# Patient Record
Sex: MALE | Race: BLACK OR AFRICAN AMERICAN | Employment: FULL TIME | URBAN - METROPOLITAN AREA
[De-identification: names, ages, dates, MRNs, and addresses within clinical notes are randomized per-mention and may not be internally consistent; named-entity substitution may affect disease eponyms.]

---

## 2019-06-05 ENCOUNTER — OFFICE VISIT (OUTPATIENT)
Dept: INTERNAL MEDICINE CLINIC | Age: 29
End: 2019-06-05
Payer: MEDICARE

## 2019-06-05 VITALS
DIASTOLIC BLOOD PRESSURE: 74 MMHG | HEART RATE: 59 BPM | OXYGEN SATURATION: 96 % | BODY MASS INDEX: 34.44 KG/M2 | SYSTOLIC BLOOD PRESSURE: 122 MMHG | WEIGHT: 246 LBS | HEIGHT: 71 IN

## 2019-06-05 DIAGNOSIS — F33.42 RECURRENT MAJOR DEPRESSIVE DISORDER, IN FULL REMISSION (HCC): ICD-10-CM

## 2019-06-05 DIAGNOSIS — E55.9 VITAMIN D DEFICIENCY: ICD-10-CM

## 2019-06-05 DIAGNOSIS — E66.09 CLASS 1 OBESITY DUE TO EXCESS CALORIES WITHOUT SERIOUS COMORBIDITY WITH BODY MASS INDEX (BMI) OF 34.0 TO 34.9 IN ADULT: ICD-10-CM

## 2019-06-05 DIAGNOSIS — F90.0 ATTENTION DEFICIT HYPERACTIVITY DISORDER (ADHD), PREDOMINANTLY INATTENTIVE TYPE: ICD-10-CM

## 2019-06-05 DIAGNOSIS — Z76.89 ENCOUNTER TO ESTABLISH CARE: Primary | ICD-10-CM

## 2019-06-05 PROBLEM — E66.811 CLASS 1 OBESITY DUE TO EXCESS CALORIES WITHOUT SERIOUS COMORBIDITY WITH BODY MASS INDEX (BMI) OF 34.0 TO 34.9 IN ADULT: Status: ACTIVE | Noted: 2019-06-05

## 2019-06-05 PROCEDURE — 1036F TOBACCO NON-USER: CPT | Performed by: PHYSICIAN ASSISTANT

## 2019-06-05 PROCEDURE — 99204 OFFICE O/P NEW MOD 45 MIN: CPT | Performed by: PHYSICIAN ASSISTANT

## 2019-06-05 PROCEDURE — G8417 CALC BMI ABV UP PARAM F/U: HCPCS | Performed by: PHYSICIAN ASSISTANT

## 2019-06-05 PROCEDURE — G8427 DOCREV CUR MEDS BY ELIG CLIN: HCPCS | Performed by: PHYSICIAN ASSISTANT

## 2019-06-05 RX ORDER — DEXTROAMPHETAMINE SACCHARATE, AMPHETAMINE ASPARTATE, DEXTROAMPHETAMINE SULFATE AND AMPHETAMINE SULFATE 2.5; 2.5; 2.5; 2.5 MG/1; MG/1; MG/1; MG/1
10 TABLET ORAL DAILY
Qty: 30 TABLET | Refills: 0 | Status: SHIPPED | OUTPATIENT
Start: 2019-06-05 | End: 2019-07-04 | Stop reason: SDUPTHER

## 2019-06-05 ASSESSMENT — ENCOUNTER SYMPTOMS
COLOR CHANGE: 0
COUGH: 0
BACK PAIN: 0
SHORTNESS OF BREATH: 0
VOICE CHANGE: 0
VOMITING: 0
SINUS PAIN: 0
EYE REDNESS: 0
EYE DISCHARGE: 0
DIARRHEA: 0
CONSTIPATION: 0
WHEEZING: 0
RHINORRHEA: 0
CHOKING: 0
ABDOMINAL PAIN: 0
BLOOD IN STOOL: 0
PHOTOPHOBIA: 0
SORE THROAT: 0
NAUSEA: 0
EYE ITCHING: 0
EYE PAIN: 0
TROUBLE SWALLOWING: 0
CHEST TIGHTNESS: 0

## 2019-06-05 ASSESSMENT — PATIENT HEALTH QUESTIONNAIRE - PHQ9
1. LITTLE INTEREST OR PLEASURE IN DOING THINGS: 0
SUM OF ALL RESPONSES TO PHQ QUESTIONS 1-9: 0
SUM OF ALL RESPONSES TO PHQ9 QUESTIONS 1 & 2: 0
2. FEELING DOWN, DEPRESSED OR HOPELESS: 0
SUM OF ALL RESPONSES TO PHQ QUESTIONS 1-9: 0

## 2019-06-05 NOTE — PROGRESS NOTES
[facility]    New Patient Note/History and Physical    Date of patient's visit: 6/5/2019    Name:  Fely Colin      YOB: 1990    Patient Care Team:  Lisandra Swan PA-C as PCP - General (Physician Assistant)    REASON FOR VISIT:   Establish care    HISTORY OF PRESENTING ILLNESS:    History was obtained from the patient. Fely Colin is a 33 y.o. male here to establish care. Patient previously seeing Dr. Oleksandr Calderon, [facility] greater than 3 years ago. Patient reports past medical history as below. Attention deficit and hyperactivity disorder. Patient has medical records, documented history of symptoms as child. Previously on Adderall with improved control of symptoms. Patient denies history of tobacco use. Social etoh use, no drug use. Patient is single, no children. Student in college, senior studying . PAST MEDICAL HISTORY:    History reviewed. No pertinent past medical history. PAST SURGICAL HISTORY:          Procedure Laterality Date    WRIST FRACTURE SURGERY       ALLERGIES:    No Known Allergies      MEDICATION:      No current outpatient medications on file prior to visit. No current facility-administered medications on file prior to visit. FAMILY HISTORY:    No family history on file. SOCIAL HISTORY:      Social History     Socioeconomic History    Marital status: Single     Spouse name: None    Number of children: None    Years of education: None    Highest education level: None   Occupational History    None   Social Needs    Financial resource strain: None    Food insecurity:     Worry: None     Inability: None    Transportation needs:     Medical: None     Non-medical: None   Tobacco Use    Smoking status: Never Smoker    Smokeless tobacco: Never Used   Substance and Sexual Activity    Alcohol use:  Yes     Alcohol/week: 0.0 oz     Comment: socially    Drug use: None    Sexual activity: None Negative for adenopathy. Does not bruise/bleed easily. Psychiatric/Behavioral: Positive for decreased concentration. Negative for agitation, behavioral problems, confusion, dysphoric mood and sleep disturbance. The patient is not nervous/anxious.       PHYSICAL EXAM:      Vitals:    06/05/19 1119   BP: 122/74   Pulse: 59   SpO2: 96%   Weight: 246 lb (111.6 kg)   Height: 5' 10.98\" (1.803 m)     General - alert, well appearing, and in no distress  Skin - normal coloration and turgor, no rashes, no suspicious skin lesions noted  Eyes - pupils equal and reactive, extraocular eye movements intact  Ears - bilateral TM's and external ear canals normal  Nose - normal and patent, no erythema, discharge or polyps  Mouth - mucous membranes moist, pharynx normal without lesions  Neck - supple, nosignificant adenopathy, no palpable masses, no carotid bruit bilaterally   Lymphatics - no palpable lymphadenopathy, no hepatosplenomegaly  Chest - clear to auscultation, no wheezes, rales or rhonchi, symmetric air entry  Heart - normal rate, regular rhythm, no murmurs, rubs, clicks or gallops  Abdomen - soft, nontender, nondistended, no masses or organomegaly  Back - full range of motion, no tenderness, palpable spasm or pain on motion  Neurological -alert, oriented, normal speech, no focal sensory or motor deficit, cranial nerve exam without deficit  Musculoskeletal - no joint tenderness, deformity or swelling, strength 5/5 in upper and lower extremities   Extremities - peripheral pulses normal, no pedal edema    LABORATORY FINDINGS:    CBC:   Lab Results   Component Value Date    WBC 9.5 02/27/2016    HGB 13.6 02/27/2016     02/27/2016     BMP:    Lab Results   Component Value Date     02/27/2016    K 4.0 02/27/2016     02/27/2016    CO2 25 02/27/2016    BUN 11 02/27/2016    CREATININE 0.74 02/27/2016    GLUCOSE 93 02/27/2016     Hemoglobin A1C: No results found for: LABA1C  Lipid profile:   Lab Results Component Value Date    CHOL 163 02/27/2016    TRIG 39 02/27/2016    HDL 56 02/27/2016     Thyroid functions:   Lab Results   Component Value Date    TSH 0.85 02/27/2016      Hepatic functions:   Lab Results   Component Value Date    ALT 22 02/27/2016    AST 43 02/27/2016    PROT 8.0 02/27/2016    BILITOT 0.61 02/27/2016    LABALBU 4.5 02/27/2016     ASSESSMENT AND PLAN:     1. Encounter to establish care  - CBC Auto Differential; Future  - Comprehensive Metabolic Panel; Future  - TSH without Reflex; Future  - Urinalysis; Future  - Vitamin D 25 Hydroxy; Future  - Lipid Panel; Future    2. Attention deficit hyperactivity disorder (ADHD), predominantly inattentive type  - discussed controlled substance monitoring, will resume medication  - amphetamine-dextroamphetamine (ADDERALL, 10MG,) 10 MG tablet; Take 1 tablet by mouth daily for 30 days. Dispense: 30 tablet; Refill: 0    3. Recurrent major depressive disorder, in full remission ([facility] [address]  - well controlled, no medications currently  - continue to monitor     4. Class 1 obesity due to excess calories without serious comorbidity with body mass index (BMI) of 34.0 to 34.9 in adult  - education materials provided  - dietary modifications, increased physical activity    5. Vitamin D deficiency  - Vitamin D 25 Hydroxy; Future    INSTRUCTIONS:   Return in about 3 months (around 9/5/2019). Taurus received counseling onthe following healthy behaviors: nutrition, exercise and medication adherence    Reviewed prior labs and health maintenance. Discussed use, benefit, and side effects of prescribed medications. Barriers tomedication compliance addressed. All patient questions answered. Patient voiced understanding. Patient given educational materials - see patient instructions    EDWIN Haynes [facility]  6/5/2019, 11:53 AM    Please note that this chart was generated using voice recognition Dragon dictation software.   Although every effort was made to ensure the accuracy of this automatedtranscription, some errors in transcription may have occurred.

## 2019-06-05 NOTE — PATIENT INSTRUCTIONS
Patient Education        Starting a Weight Loss Plan: Care Instructions  Your Care Instructions    If you are thinking about losing weight, it can be hard to know where to start. Your doctor can help you set up a weight loss plan that best meets your needs. You may want to take a class on nutrition or exercise, or join a weight loss support group. If you have questions about how to make changes to your eating or exercise habits, ask your doctor about seeing a registered dietitian or an exercise specialist.  It can be a big challenge to lose weight. But you do not have to make huge changes at once. Make small changes, and stick with them. When those changes become habit, add a few more changes. If you do not think you are ready to make changes right now, try to pick a date in the future. Make an appointment to see your doctor to discuss whether the time is right for you to start a plan. Follow-up care is a key part of your treatment and safety. Be sure to make and go to all appointments, and call your doctor if you are having problems. It's also a good idea to know your test results and keep a list of the medicines you take. How can you care for yourself at home? · Set realistic goals. Many people expect to lose much more weight than is likely. A weight loss of 5% to 10% of your body weight may be enough to improve your health. · Get family and friends involved to provide support. Talk to them about why you are trying to lose weight, and ask them to help. They can help by participating in exercise and having meals with you, even if they may be eating something different. · Find what works best for you. If you do not have time or do not like to cook, a program that offers meal replacement bars or shakes may be better for you.  Or if you like to prepare meals, finding a plan that includes daily menus and recipes may be best.  · Ask your doctor about other health professionals who can help you achieve your weight at increased risk for health problems such as fatigue, lower protection (immunity) against illness, muscle loss, bone loss, hair loss, and hormone problems. BMI is just one measure of your risk for weight-related health problems. You may be at higher risk for health problems if you are not active, you eat an unhealthy diet, or you drink too much alcohol or use tobacco products. Follow-up care is a key part of your treatment and safety. Be sure to make and go to all appointments, and call your doctor if you are having problems. It's also a good idea to know your test results and keep a list of the medicines you take. How can you care for yourself at home? · Practice healthy eating habits. This includes eating plenty of fruits, vegetables, whole grains, lean protein, and low-fat dairy. · If your doctor recommends it, get more exercise. Walking is a good choice. Bit by bit, increase the amount you walk every day. Try for at least 30 minutes on most days of the week. · Do not smoke. Smoking can increase your risk for health problems. If you need help quitting, talk to your doctor about stop-smoking programs and medicines. These can increase your chances of quitting for good. · Limit alcohol to 2 drinks a day for men and 1 drink a day for women. Too much alcohol can cause health problems. If you have a BMI higher than 25  · Your doctor may do other tests to check your risk for weight-related health problems. This may include measuring the distance around your waist. A waist measurement of more than 40 inches in men or 35 inches in women can increase the risk of weight-related health problems. · Talk with your doctor about steps you can take to stay healthy or improve your health. You may need to make lifestyle changes to lose weight and stay healthy, such as changing your diet and getting regular exercise.   If you have a BMI lower than 18.5  · Your doctor may do other tests to check your risk for health

## 2019-07-03 DIAGNOSIS — F90.0 ATTENTION DEFICIT HYPERACTIVITY DISORDER (ADHD), PREDOMINANTLY INATTENTIVE TYPE: ICD-10-CM

## 2019-07-03 DIAGNOSIS — Z51.81 ENCOUNTER FOR MEDICATION MONITORING: Primary | ICD-10-CM

## 2019-07-03 NOTE — TELEPHONE ENCOUNTER
Medication Requested:  Adderall    Last visit: 6/5/2019  Next visit: 9/6/2019  Last refill: 6/5/2019    Med contract on file:  [x] yes   [] no    Last urine drug screen:  Needs UDS ordered  Consistent with medication(s):    [] yes   [] no    Last OARRS ran:     Quantity of medication remaining: 3  Who will be picking rx up: self    Pharmacy if escribed: [facility] / [place]

## 2019-07-04 RX ORDER — DEXTROAMPHETAMINE SACCHARATE, AMPHETAMINE ASPARTATE, DEXTROAMPHETAMINE SULFATE AND AMPHETAMINE SULFATE 2.5; 2.5; 2.5; 2.5 MG/1; MG/1; MG/1; MG/1
10 TABLET ORAL DAILY
Qty: 30 TABLET | Refills: 0 | Status: SHIPPED | OUTPATIENT
Start: 2019-07-04 | End: 2019-08-13 | Stop reason: SDUPTHER

## 2019-08-06 DIAGNOSIS — F90.0 ATTENTION DEFICIT HYPERACTIVITY DISORDER (ADHD), PREDOMINANTLY INATTENTIVE TYPE: ICD-10-CM

## 2019-08-06 RX ORDER — DEXTROAMPHETAMINE SACCHARATE, AMPHETAMINE ASPARTATE, DEXTROAMPHETAMINE SULFATE AND AMPHETAMINE SULFATE 2.5; 2.5; 2.5; 2.5 MG/1; MG/1; MG/1; MG/1
10 TABLET ORAL DAILY
Qty: 30 TABLET | Refills: 0 | Status: CANCELLED | OUTPATIENT
Start: 2019-08-06 | End: 2019-09-05

## 2019-08-12 DIAGNOSIS — F90.0 ATTENTION DEFICIT HYPERACTIVITY DISORDER (ADHD), PREDOMINANTLY INATTENTIVE TYPE: ICD-10-CM

## 2019-08-13 RX ORDER — DEXTROAMPHETAMINE SACCHARATE, AMPHETAMINE ASPARTATE, DEXTROAMPHETAMINE SULFATE AND AMPHETAMINE SULFATE 2.5; 2.5; 2.5; 2.5 MG/1; MG/1; MG/1; MG/1
10 TABLET ORAL DAILY
Qty: 30 TABLET | Refills: 0 | Status: SHIPPED | OUTPATIENT
Start: 2019-08-13 | End: 2019-09-06 | Stop reason: SDUPTHER

## 2019-09-04 DIAGNOSIS — F90.0 ATTENTION DEFICIT HYPERACTIVITY DISORDER (ADHD), PREDOMINANTLY INATTENTIVE TYPE: ICD-10-CM

## 2019-09-06 ENCOUNTER — OFFICE VISIT (OUTPATIENT)
Dept: INTERNAL MEDICINE CLINIC | Age: 29
End: 2019-09-06
Payer: MEDICARE

## 2019-09-06 VITALS
OXYGEN SATURATION: 97 % | SYSTOLIC BLOOD PRESSURE: 138 MMHG | DIASTOLIC BLOOD PRESSURE: 62 MMHG | WEIGHT: 222 LBS | BODY MASS INDEX: 31.08 KG/M2 | HEIGHT: 71 IN | HEART RATE: 77 BPM

## 2019-09-06 DIAGNOSIS — E66.09 CLASS 1 OBESITY DUE TO EXCESS CALORIES WITHOUT SERIOUS COMORBIDITY WITH BODY MASS INDEX (BMI) OF 30.0 TO 30.9 IN ADULT: ICD-10-CM

## 2019-09-06 DIAGNOSIS — F90.0 ATTENTION DEFICIT HYPERACTIVITY DISORDER (ADHD), PREDOMINANTLY INATTENTIVE TYPE: Primary | ICD-10-CM

## 2019-09-06 DIAGNOSIS — F33.42 RECURRENT MAJOR DEPRESSIVE DISORDER, IN FULL REMISSION (HCC): ICD-10-CM

## 2019-09-06 DIAGNOSIS — E55.9 VITAMIN D DEFICIENCY: ICD-10-CM

## 2019-09-06 PROCEDURE — 1036F TOBACCO NON-USER: CPT | Performed by: PHYSICIAN ASSISTANT

## 2019-09-06 PROCEDURE — 99214 OFFICE O/P EST MOD 30 MIN: CPT | Performed by: PHYSICIAN ASSISTANT

## 2019-09-06 PROCEDURE — G8427 DOCREV CUR MEDS BY ELIG CLIN: HCPCS | Performed by: PHYSICIAN ASSISTANT

## 2019-09-06 PROCEDURE — G8417 CALC BMI ABV UP PARAM F/U: HCPCS | Performed by: PHYSICIAN ASSISTANT

## 2019-09-06 RX ORDER — DEXTROAMPHETAMINE SACCHARATE, AMPHETAMINE ASPARTATE MONOHYDRATE, DEXTROAMPHETAMINE SULFATE AND AMPHETAMINE SULFATE 2.5; 2.5; 2.5; 2.5 MG/1; MG/1; MG/1; MG/1
10 CAPSULE, EXTENDED RELEASE ORAL DAILY
Qty: 30 CAPSULE | Refills: 0 | Status: SHIPPED | OUTPATIENT
Start: 2019-09-06 | End: 2019-10-03 | Stop reason: SDUPTHER

## 2019-09-06 RX ORDER — OXYCODONE HYDROCHLORIDE AND ACETAMINOPHEN 5; 325 MG/1; MG/1
TABLET ORAL
COMMUNITY
End: 2019-09-06 | Stop reason: ALTCHOICE

## 2019-09-06 RX ORDER — DEXTROAMPHETAMINE SACCHARATE, AMPHETAMINE ASPARTATE, DEXTROAMPHETAMINE SULFATE AND AMPHETAMINE SULFATE 2.5; 2.5; 2.5; 2.5 MG/1; MG/1; MG/1; MG/1
10 TABLET ORAL DAILY
Qty: 30 TABLET | Refills: 0 | OUTPATIENT
Start: 2019-09-06 | End: 2019-10-06

## 2019-09-06 ASSESSMENT — ENCOUNTER SYMPTOMS
EYE ITCHING: 0
EYE DISCHARGE: 0
DIARRHEA: 0
ABDOMINAL PAIN: 0
BACK PAIN: 0
NAUSEA: 0
COLOR CHANGE: 0
VOMITING: 0
CHEST TIGHTNESS: 0
CONSTIPATION: 0
SINUS PAIN: 0
RHINORRHEA: 0
COUGH: 0
EYE PAIN: 0
SORE THROAT: 0
SHORTNESS OF BREATH: 0
BLOOD IN STOOL: 0

## 2019-09-06 NOTE — PROGRESS NOTES
medications for this visit. ALLERGIES:    No Known Allergies      SOCIAL HISTORY    Reviewed and no change from previous record. Taurus  reports that he has never smoked. He has never used smokeless tobacco.    FAMILY HISTORY:    Reviewed and No change from previous visit    REVIEW OF SYSTEMS:    Review of Systems   Constitutional: Negative for chills, fatigue and fever. HENT: Negative for congestion, ear discharge, ear pain, hearing loss, rhinorrhea, sinus pain and sore throat. Eyes: Negative for pain, discharge, itching and visual disturbance. Respiratory: Negative for cough, chest tightness and shortness of breath. Cardiovascular: Negative for chest pain, palpitations and leg swelling. Gastrointestinal: Negative for abdominal pain, blood in stool, constipation, diarrhea, nausea and vomiting. Genitourinary: Negative for difficulty urinating, dysuria, flank pain, frequency, hematuria and urgency. Musculoskeletal: Negative for arthralgias, back pain, neck pain and neck stiffness. Skin: Negative for color change, pallor and rash. Neurological: Negative for dizziness, weakness, light-headedness, numbness and headaches. Hematological: Negative for adenopathy. Does not bruise/bleed easily. Psychiatric/Behavioral: Positive for decreased concentration. Negative for dysphoric mood and sleep disturbance. The patient is not nervous/anxious.       PHYSICAL EXAM:      Vitals:    09/06/19 1025   BP: 138/62   Pulse: 77   SpO2: 97%   Weight: 222 lb (100.7 kg)   Height: 5' 10.98\" (1.803 m)     BP Readings from Last 3 Encounters:   09/06/19 138/62   06/05/19 122/74   05/26/16 122/70      Wt Readings from Last 3 Encounters:   09/06/19 222 lb (100.7 kg)   06/05/19 246 lb (111.6 kg)   05/26/16 174 lb 6 oz (79.1 kg)     General - alert, well appearing, and in no distress  Skin - normal coloration and turgor, no rashes  Eyes - pupils equal and reactive, extraocular eye movements intact  Mouth - mucous prescribed medications. Barriers to medication compliance addressed. All patient questions answered. Patient voiced understanding. Patient given educational materials - see patient instructions    EDWIN Reina [facility]  9/6/2019, 10:56 AM    Please note that this chart was generated using voice recognition Dragon dictation software. Although everyeffort was made to ensure the accuracy of this automated transcription, some errors in transcription may have occurred.

## 2019-09-06 NOTE — PROGRESS NOTES
Visit Information    Have you changed or started any medications since your last visit including any over-the-counter medicines, vitamins, or herbal medicines? no   Are you having any side effects from any of your medications? -  no  Have you stopped taking any of your medications? Is so, why? -  no    Have you seen any other physician or provider since your last visit? No  Have you had any other diagnostic tests since your last visit? No  Have you been seen in the emergency room and/or had an admission to a hospital since we last saw you? No  Have you had your routine dental cleaning in the past 6 months? no    Have you activated your [employer] account? If not, what are your barriers?  No     Patient Care Team:  Tg Ozuna PA-C as PCP - General (Physician Assistant)  Tg Ozuna PA-C as PCP - [facility] Provider    Medical History Review  Past Medical, Family, and Social History reviewed and does not contribute to the patient presenting condition    Health Maintenance   Topic Date Due    Varicella Vaccine (1 of 2 - 13+ 2-dose series) 11/26/2004    HIV screen  11/26/2006    DTaP/Tdap/Td vaccine (1 - Tdap) 11/26/2010    Flu vaccine (1) 09/01/2019    HPV vaccine  Aged Out    Pneumococcal 0-64 years Vaccine  Aged Out

## 2019-10-03 DIAGNOSIS — F90.0 ATTENTION DEFICIT HYPERACTIVITY DISORDER (ADHD), PREDOMINANTLY INATTENTIVE TYPE: ICD-10-CM

## 2019-10-07 RX ORDER — DEXTROAMPHETAMINE SACCHARATE, AMPHETAMINE ASPARTATE MONOHYDRATE, DEXTROAMPHETAMINE SULFATE AND AMPHETAMINE SULFATE 2.5; 2.5; 2.5; 2.5 MG/1; MG/1; MG/1; MG/1
10 CAPSULE, EXTENDED RELEASE ORAL DAILY
Qty: 30 CAPSULE | Refills: 0 | Status: SHIPPED | OUTPATIENT
Start: 2019-10-07 | End: 2019-11-12 | Stop reason: SDUPTHER

## 2019-10-15 ENCOUNTER — HOSPITAL ENCOUNTER (OUTPATIENT)
Age: 29
Setting detail: SPECIMEN
Discharge: HOME | End: 2019-10-15
Payer: MEDICARE

## 2019-10-16 DIAGNOSIS — Z51.81 ENCOUNTER FOR MEDICATION MONITORING: ICD-10-CM

## 2019-10-20 LAB
6-ACETYLMORPHINE, UR: NOT DETECTED
7-AMINOCLONAZEPAM, URINE: NOT DETECTED
ALPHA-OH-ALPRAZ, URINE: NOT DETECTED
ALPRAZOLAM, URINE: NOT DETECTED
AMPHETAMINES, URINE: NOT DETECTED
BARBITURATES, URINE: NOT DETECTED
BENZOYLECGONINE, UR: NOT DETECTED
BUPRENORPHINE URINE: NOT DETECTED
CARISOPRODOL, UR: NOT DETECTED
CLONAZEPAM, URINE: NOT DETECTED
CODEINE, URINE: NOT DETECTED
CREATININE URINE: 155.3 MG/DL (ref 20–400)
DIAZEPAM, URINE: NOT DETECTED
DRUGS EXPECTED, UR: NORMAL
EER HI RES INTERP UR: NORMAL
ETHYL GLUCURONIDE UR: NOT DETECTED
FENTANYL URINE: NOT DETECTED
HYDROCODONE, URINE: NOT DETECTED
HYDROMORPHONE, URINE: NOT DETECTED
LORAZEPAM, URINE: NOT DETECTED
MARIJUANA METAB, UR: NOT DETECTED
MDA, UR: NOT DETECTED
MDEA, EVE, UR: NOT DETECTED
MDMA URINE: NOT DETECTED
MEPERIDINE METAB, UR: NOT DETECTED
METHADONE, URINE: NOT DETECTED
METHAMPHETAMINE, URINE: NOT DETECTED
METHYLPHENIDATE: NOT DETECTED
MIDAZOLAM, URINE: NOT DETECTED
MORPHINE URINE: NOT DETECTED
NORBUPRENORPHINE, URINE: NOT DETECTED
NORDIAZEPAM, URINE: NOT DETECTED
NORFENTANYL, URINE: NOT DETECTED
NORHYDROCODONE, URINE: NOT DETECTED
NOROXYCODONE, URINE: NOT DETECTED
NOROXYMORPHONE, URINE: NOT DETECTED
OXAZEPAM, URINE: NOT DETECTED
OXYCODONE URINE: NOT DETECTED
OXYMORPHONE, URINE: NOT DETECTED
PAIN MANAGEMENT DRUG PANEL INTERP, URINE: NORMAL
PAIN MGT DRUG PANEL, HI RES, UR: NORMAL
PCP,URINE: NOT DETECTED
PHENTERMINE, UR: NOT DETECTED
PROPOXYPHENE, URINE: NOT DETECTED
TAPENTADOL, URINE: NOT DETECTED
TAPENTADOL-O-SULFATE, URINE: NOT DETECTED
TEMAZEPAM, URINE: NOT DETECTED
TRAMADOL, URINE: NOT DETECTED
ZOLPIDEM, URINE: NOT DETECTED

## 2019-11-12 ENCOUNTER — TELEPHONE (OUTPATIENT)
Dept: INTERNAL MEDICINE CLINIC | Age: 29
End: 2019-11-12

## 2019-11-12 DIAGNOSIS — F90.0 ATTENTION DEFICIT HYPERACTIVITY DISORDER (ADHD), PREDOMINANTLY INATTENTIVE TYPE: ICD-10-CM

## 2019-11-12 RX ORDER — DEXTROAMPHETAMINE SACCHARATE, AMPHETAMINE ASPARTATE MONOHYDRATE, DEXTROAMPHETAMINE SULFATE AND AMPHETAMINE SULFATE 2.5; 2.5; 2.5; 2.5 MG/1; MG/1; MG/1; MG/1
10 CAPSULE, EXTENDED RELEASE ORAL DAILY
Qty: 30 CAPSULE | Refills: 0 | Status: SHIPPED | OUTPATIENT
Start: 2019-11-12 | End: 2019-12-06 | Stop reason: SDUPTHER

## 2019-12-05 LAB
ABSOLUTE BASO #: 0 X10E9/L (ref 0–0.9)
ABSOLUTE EOS #: 0 X10E9/L (ref 0–0.4)
ABSOLUTE LYMPH #: 1.5 X10E9/L (ref 1–3.5)
ABSOLUTE MONO #: 0.8 X10E9/L (ref 0–0.9)
ABSOLUTE NEUT #: 7.7 X10E9/L (ref 1.5–6.6)
ALBUMIN SERPL-MCNC: 4.6 G/DL (ref 3.2–5.3)
ALK PHOSPHATASE: 45 U/L (ref 39–130)
ALT SERPL-CCNC: 31 U/L (ref 0–40)
ANION GAP SERPL CALCULATED.3IONS-SCNC: 10 MMOL/L (ref 4–12)
APPEARANCE: CLEAR
AST SERPL-CCNC: 26 U/L (ref 0–41)
BASOPHILS RELATIVE PERCENT: 0.2 %
BILIRUB SERPL-MCNC: 0.8 MG/DL (ref 0.3–1.2)
BILIRUBIN: NEGATIVE
BUN BLDV-MCNC: 12 MG/DL (ref 5–23)
CALCIUM SERPL-MCNC: 9.7 MG/DL (ref 8.5–10.5)
CHLORIDE BLD-SCNC: 103 MMOL/L (ref 98–109)
CHOLESTEROL/HDL RATIO: 3.8 (ref 1–5)
CHOLESTEROL: 161 MG/DL (ref 150–200)
CO2: 29 MMOL/L (ref 22–32)
COLOR: YELLOW
CREAT SERPL-MCNC: 1.02 MG/DL (ref 0.6–1.3)
EGFR AFRICAN AMERICAN: >60 ML/MIN/1.73SQ.M
EGFR IF NONAFRICAN AMERICAN: >60 ML/MIN/1.73SQ.M
EOSINOPHILS RELATIVE PERCENT: 0.5 %
GLUCOSE BLD-MCNC: NEGATIVE MG/DL
GLUCOSE: 90 MG/DL (ref 65–99)
HCT VFR BLD CALC: 43.9 % (ref 39–49)
HDLC SERPL-MCNC: 42 MG/DL
HEMOGLOBIN: 14 G/DL (ref 13.1–17.3)
KETONES, URINE: NEGATIVE MG/DL
LDL CHOLESTEROL CALCULATED: 104 MG/DL
LDL/HDL RATIO: 2.5
LEUKOCYTE ESTERASE, URINE: NEGATIVE
LYMPHOCYTE %: 14.6 %
MCH RBC QN AUTO: 23.3 PG (ref 27–34)
MCHC RBC AUTO-ENTMCNC: 31.9 G/DL (ref 32–37)
MCV RBC AUTO: 73 FL (ref 80–99)
MONOCYTES # BLD: 7.8 %
NEUTROPHILS RELATIVE PERCENT: 76.9 %
NITRITE, URINE: NEGATIVE
OCCULT BLOOD,URINE: NEGATIVE
OTHER MICROSCOPIC ELEMENTS: ABNORMAL
PDW BLD-RTO: 15.1 % (ref 11.4–14.3)
PH: 6.5 (ref 5–8.5)
PLATELETS: 241 X10E9/L (ref 150–450)
PMV BLD AUTO: 8.3 FL (ref 7–12)
POTASSIUM SERPL-SCNC: 3.9 MMOL/L (ref 3.5–5)
PROTEIN, URINE: 30 MG/DL
RBC: 3 /HPF (ref 0–5)
RBC: 6.01 X10E12/L (ref 4.25–5.65)
SODIUM BLD-SCNC: 142 MMOL/L (ref 134–146)
SP GRAVITY MISCELLANEOUS: 1.02 (ref 1–1.03)
TOTAL PROTEIN: 8.1 G/DL (ref 6–8)
TRIGL SERPL-MCNC: 75 MG/DL (ref 27–150)
TSH SERPL DL<=0.05 MIU/L-ACNC: 1.11 UIU/ML (ref 0.49–4.67)
UROBILINOGEN, URINE: 1 EU/DL
VITAMIN D 25-HYDROXY: 14.3 NG/ML (ref 30–100)
VLDLC SERPL CALC-MCNC: 15 MG/DL (ref 0–30)
WBC: 10 X10E9/L (ref 4.8–10.8)
WBC: 3 /HPF (ref 0–5)

## 2019-12-06 ENCOUNTER — OFFICE VISIT (OUTPATIENT)
Dept: INTERNAL MEDICINE CLINIC | Age: 29
End: 2019-12-06
Payer: MEDICARE

## 2019-12-06 VITALS
WEIGHT: 198 LBS | HEIGHT: 71 IN | BODY MASS INDEX: 27.72 KG/M2 | SYSTOLIC BLOOD PRESSURE: 128 MMHG | DIASTOLIC BLOOD PRESSURE: 74 MMHG

## 2019-12-06 DIAGNOSIS — F33.0 MILD EPISODE OF RECURRENT MAJOR DEPRESSIVE DISORDER (HCC): ICD-10-CM

## 2019-12-06 DIAGNOSIS — M25.531 RIGHT WRIST PAIN: ICD-10-CM

## 2019-12-06 DIAGNOSIS — F90.0 ATTENTION DEFICIT HYPERACTIVITY DISORDER (ADHD), PREDOMINANTLY INATTENTIVE TYPE: ICD-10-CM

## 2019-12-06 LAB
AMPHETAMINES: POSITIVE
BARBITURATES: NEGATIVE
BENZODIAZEPINES: NEGATIVE
COCAINE: NEGATIVE
ETHANOL SCREEN: NEGATIVE
METHADONE: NEGATIVE
OPIATES: NEGATIVE
PHENCYCLIDINE: NEGATIVE
THC: NEGATIVE

## 2019-12-06 PROCEDURE — G8427 DOCREV CUR MEDS BY ELIG CLIN: HCPCS | Performed by: PHYSICIAN ASSISTANT

## 2019-12-06 PROCEDURE — 99214 OFFICE O/P EST MOD 30 MIN: CPT | Performed by: PHYSICIAN ASSISTANT

## 2019-12-06 PROCEDURE — G8417 CALC BMI ABV UP PARAM F/U: HCPCS | Performed by: PHYSICIAN ASSISTANT

## 2019-12-06 PROCEDURE — G8484 FLU IMMUNIZE NO ADMIN: HCPCS | Performed by: PHYSICIAN ASSISTANT

## 2019-12-06 PROCEDURE — 1036F TOBACCO NON-USER: CPT | Performed by: PHYSICIAN ASSISTANT

## 2019-12-06 RX ORDER — DEXTROAMPHETAMINE SACCHARATE, AMPHETAMINE ASPARTATE MONOHYDRATE, DEXTROAMPHETAMINE SULFATE AND AMPHETAMINE SULFATE 2.5; 2.5; 2.5; 2.5 MG/1; MG/1; MG/1; MG/1
10 CAPSULE, EXTENDED RELEASE ORAL DAILY
Qty: 30 CAPSULE | Refills: 0 | Status: SHIPPED | OUTPATIENT
Start: 2019-12-06 | End: 2020-01-10 | Stop reason: SDUPTHER

## 2019-12-06 ASSESSMENT — ENCOUNTER SYMPTOMS
EYE ITCHING: 0
DIARRHEA: 0
CONSTIPATION: 0
VOICE CHANGE: 0
SHORTNESS OF BREATH: 0
SORE THROAT: 0
COUGH: 0
TROUBLE SWALLOWING: 0
NAUSEA: 0
ABDOMINAL PAIN: 0
CHEST TIGHTNESS: 0
VOMITING: 0
COLOR CHANGE: 0
EYE DISCHARGE: 0
EYE PAIN: 0
BLOOD IN STOOL: 0
SINUS PAIN: 0
RHINORRHEA: 0
BACK PAIN: 0

## 2019-12-09 ENCOUNTER — TELEPHONE (OUTPATIENT)
Dept: INTERNAL MEDICINE CLINIC | Age: 29
End: 2019-12-09

## 2019-12-09 DIAGNOSIS — F33.0 MILD EPISODE OF RECURRENT DEPRESSIVE DISORDER (HCC): Primary | ICD-10-CM

## 2019-12-10 LAB
ADULTERANTS: NEGATIVE
AMPHETAMINE GC/MS CONF: 6127 NG/ML
CREATININE URINE: 226 MG/DL (ref 20–250)
Lab: NEGATIVE NG/ML
Lab: NEGATIVE NG/ML
METHAMPHETAMINE, URINE: NEGATIVE NG/ML
METHYLENEDIOXYAMPHETAMINE, UR: NEGATIVE NG/ML

## 2019-12-11 DIAGNOSIS — E55.9 VITAMIN D DEFICIENCY: Primary | ICD-10-CM

## 2019-12-11 RX ORDER — ERGOCALCIFEROL 1.25 MG/1
50000 CAPSULE ORAL WEEKLY
Qty: 12 CAPSULE | Refills: 0 | Status: SHIPPED | OUTPATIENT
Start: 2019-12-11 | End: 2020-01-10 | Stop reason: SDUPTHER

## 2020-01-10 RX ORDER — DEXTROAMPHETAMINE SACCHARATE, AMPHETAMINE ASPARTATE MONOHYDRATE, DEXTROAMPHETAMINE SULFATE AND AMPHETAMINE SULFATE 2.5; 2.5; 2.5; 2.5 MG/1; MG/1; MG/1; MG/1
10 CAPSULE, EXTENDED RELEASE ORAL DAILY
Qty: 30 CAPSULE | Refills: 0 | Status: SHIPPED | OUTPATIENT
Start: 2020-01-10 | End: 2020-02-14 | Stop reason: SDUPTHER

## 2020-01-10 RX ORDER — ERGOCALCIFEROL 1.25 MG/1
50000 CAPSULE ORAL WEEKLY
Qty: 12 CAPSULE | Refills: 0 | Status: SHIPPED | OUTPATIENT
Start: 2020-01-10 | End: 2020-04-10 | Stop reason: SDUPTHER

## 2020-01-15 ENCOUNTER — OFFICE VISIT (OUTPATIENT)
Dept: INTERNAL MEDICINE CLINIC | Age: 30
End: 2020-01-15
Payer: COMMERCIAL

## 2020-01-15 VITALS
HEIGHT: 71 IN | WEIGHT: 184 LBS | DIASTOLIC BLOOD PRESSURE: 74 MMHG | OXYGEN SATURATION: 99 % | SYSTOLIC BLOOD PRESSURE: 120 MMHG | HEART RATE: 76 BPM | BODY MASS INDEX: 25.76 KG/M2

## 2020-01-15 PROBLEM — F41.9 ANXIETY: Status: ACTIVE | Noted: 2020-01-15

## 2020-01-15 PROBLEM — F33.1 MODERATE RECURRENT MAJOR DEPRESSION (HCC): Status: ACTIVE | Noted: 2020-01-15

## 2020-01-15 PROBLEM — N45.1 EPIDIDYMITIS, LEFT: Status: ACTIVE | Noted: 2020-01-15

## 2020-01-15 PROBLEM — N20.0 NEPHROLITHIASIS: Status: ACTIVE | Noted: 2020-01-15

## 2020-01-15 PROCEDURE — G8484 FLU IMMUNIZE NO ADMIN: HCPCS | Performed by: PHYSICIAN ASSISTANT

## 2020-01-15 PROCEDURE — G8417 CALC BMI ABV UP PARAM F/U: HCPCS | Performed by: PHYSICIAN ASSISTANT

## 2020-01-15 PROCEDURE — 1036F TOBACCO NON-USER: CPT | Performed by: PHYSICIAN ASSISTANT

## 2020-01-15 PROCEDURE — 99214 OFFICE O/P EST MOD 30 MIN: CPT | Performed by: PHYSICIAN ASSISTANT

## 2020-01-15 PROCEDURE — G8427 DOCREV CUR MEDS BY ELIG CLIN: HCPCS | Performed by: PHYSICIAN ASSISTANT

## 2020-01-15 ASSESSMENT — ENCOUNTER SYMPTOMS
SHORTNESS OF BREATH: 0
BACK PAIN: 0
ABDOMINAL PAIN: 0
EYE ITCHING: 0
RHINORRHEA: 0
CHEST TIGHTNESS: 0
VOMITING: 0
EYE DISCHARGE: 0
SINUS PAIN: 0
NAUSEA: 0
BLOOD IN STOOL: 0
COLOR CHANGE: 0
EYE PAIN: 0
DIARRHEA: 0
TROUBLE SWALLOWING: 0
CONSTIPATION: 0
VOICE CHANGE: 0
SORE THROAT: 0
COUGH: 0

## 2020-01-15 NOTE — PROGRESS NOTES
trauma or known hematuria. Pt states he also found lump on   left testicle. WBC 16. QUESTION FOR RADIOLOGIST: R/O Renal Stones     PROTOCOL: Axial CT images of the abdomen and pelvis were obtained   without IV contrast.     COMPARISON: None. TECHNIQUE: Multidetector CT axial slices of the abdomen and pelvis   were obtained without IV contrast. Multiplanar reformats were   performed and viewed on a separate workstation and reviewed to   further define anatomy and possible pathology. Appropriate CT dose   lowering techniques were utilized. FINDINGS:     Images through the lower chest are unremarkable. The liver, gallbladder, spleen, adrenals, pancreas are unremarkable. The right kidney and right ureter are unremarkable. No evidence of   right-sided nephrolithiasis or hydronephrosis. There is a solitary nonobstructing calculus in the midpole of the   left kidney which measures approximately 2 mm. There is a moderate   hydronephrosis on the left. The left ureter is normal in caliber. Bladder: Bladder is unremarkable. Stomach, small bowel, appendix, and colon are within normal limits. No free intraperitoneal air or free fluid. The aorta and retroperitoneum are unremarkable. Study reviewed in bone windows shows no acute bony abnormality. IMPRESSION:     1. Nonobstructing calculus in the midpole of the left kidney which   measures approximately 2 mm. Moderate left-sided hydronephrosis, most   likely from chronic UPJ obstruction. 2. Noncontrast evaluation of other abdominal structures is   unremarkable. Approved by:Brett Vu on 1/10/2020 4:24 AM EST. I, Blu Victor, have reviewed the images and report and concur with   these findings. Electronically signed by:Blu Victor.      LABORATORY FINDINGS:    CBC:  Lab Results   Component Value Date    WBC 10.0 12/05/2019    WBC 3 12/05/2019    WBC 9.5 02/27/2016    HGB 14.0 12/05/2019     12/05/2019    PLT

## 2020-02-14 ENCOUNTER — OFFICE VISIT (OUTPATIENT)
Dept: INTERNAL MEDICINE CLINIC | Age: 30
End: 2020-02-14
Payer: COMMERCIAL

## 2020-02-14 VITALS
DIASTOLIC BLOOD PRESSURE: 82 MMHG | HEART RATE: 54 BPM | SYSTOLIC BLOOD PRESSURE: 129 MMHG | BODY MASS INDEX: 29.76 KG/M2 | OXYGEN SATURATION: 99 % | HEIGHT: 71 IN | WEIGHT: 212.6 LBS

## 2020-02-14 PROCEDURE — G8484 FLU IMMUNIZE NO ADMIN: HCPCS | Performed by: PHYSICIAN ASSISTANT

## 2020-02-14 PROCEDURE — G8427 DOCREV CUR MEDS BY ELIG CLIN: HCPCS | Performed by: PHYSICIAN ASSISTANT

## 2020-02-14 PROCEDURE — 99213 OFFICE O/P EST LOW 20 MIN: CPT | Performed by: PHYSICIAN ASSISTANT

## 2020-02-14 PROCEDURE — 1036F TOBACCO NON-USER: CPT | Performed by: PHYSICIAN ASSISTANT

## 2020-02-14 PROCEDURE — G8417 CALC BMI ABV UP PARAM F/U: HCPCS | Performed by: PHYSICIAN ASSISTANT

## 2020-02-14 RX ORDER — DEXTROAMPHETAMINE SACCHARATE, AMPHETAMINE ASPARTATE MONOHYDRATE, DEXTROAMPHETAMINE SULFATE AND AMPHETAMINE SULFATE 2.5; 2.5; 2.5; 2.5 MG/1; MG/1; MG/1; MG/1
10 CAPSULE, EXTENDED RELEASE ORAL DAILY
Qty: 30 CAPSULE | Refills: 0 | Status: SHIPPED | OUTPATIENT
Start: 2020-02-14 | End: 2020-03-13 | Stop reason: SDUPTHER

## 2020-02-14 ASSESSMENT — ENCOUNTER SYMPTOMS
VOMITING: 0
NAUSEA: 0
SHORTNESS OF BREATH: 0
BACK PAIN: 0
COLOR CHANGE: 0
CHEST TIGHTNESS: 0
ABDOMINAL PAIN: 0
COUGH: 0

## 2020-02-14 NOTE — PROGRESS NOTES
[facility]  [facility] [address] [place] [address]  Dept: [phone]  Dept Fax: [phone]    OfficeProgress/Follow Up Note  Date of patient's visit: 2/14/2020  Patient's Name:  Bettina Servin YOB: 1990            Patient Care Team:  Valentina Alicia PA-C as PCP - General (Physician Assistant)  Valentina Alicia PA-C as PCP - [facility] Empaneled Provider    REASON FOR VISIT:  Routine outpatient follow up    HISTORY OF PRESENT ILLNESS:      Chief Complaint   Patient presents with    1 Month Follow-Up    Anxiety     History was obtained from the patient. Bettina Servin is a 30 y.o. male here today for follow up. \"Anxiety and depression. Increased stress due to relationship problems. Has been seeing therapist, would like to see new provider. Sleep is fair, appetite normal.  Reports history of suicidal thoughts in past, currently denies. No history of self harm. \"    Patient reports appointment next week with counseling. Symptoms are stable, sleep improved. No self harm or suicidal thoughts. Patient Active Problem List   Diagnosis    High risk medication use    ADD (attention deficit disorder)    Vitamin D deficiency    Chronic fatigue    Mild episode of recurrent major depressive disorder ([address]    BMI 27.0-27.9,adult    Nephrolithiasis    Epididymitis, left    Anxiety    Moderate recurrent major depression ([facility] [address]     Health Maintenance Due   Topic Date Due    Varicella vaccine (1 of 2 - 2-dose childhood series) 11/26/1992    DTaP/Tdap/Td vaccine (1 - Tdap) 11/26/2002    HIV screen  11/26/2006     MEDICATIONS:      Current Outpatient Medications   Medication Sig Dispense Refill    amphetamine-dextroamphetamine (ADDERALL XR) 10 MG extended release capsule Take 1 capsule by mouth daily for 30 days.  30 capsule 0    vitamin D (ERGOCALCIFEROL) 1.25 MG (95830 UT) CAPS capsule Take 1 capsule by mouth once a week 12 capsule 0     No current facility-administered medications for this visit. ALLERGIES:    No Known Allergies      SOCIAL HISTORY    Reviewed and no change from previous record. Taurus  reports that he has never smoked. He has never used smokeless tobacco.    FAMILY HISTORY:    Reviewed and No change from previous visit    REVIEW OF SYSTEMS:    Review of Systems   Constitutional: Negative for chills, fatigue and fever. Respiratory: Negative for cough, chest tightness and shortness of breath. Cardiovascular: Negative for chest pain and leg swelling. Gastrointestinal: Negative for abdominal pain, nausea and vomiting. Genitourinary: Negative for difficulty urinating, dysuria, flank pain, frequency, hematuria and urgency. Musculoskeletal: Negative for arthralgias, back pain, neck pain and neck stiffness. Skin: Negative for color change, pallor and rash. Neurological: Negative for dizziness, weakness, light-headedness, numbness and headaches. Hematological: Negative for adenopathy. Psychiatric/Behavioral: Positive for decreased concentration (chronic) and dysphoric mood (stable). Negative for confusion, hallucinations, self-injury, sleep disturbance and suicidal ideas. The patient is nervous/anxious. The patient is not hyperactive.       PHYSICAL EXAM:      Vitals:    02/14/20 1057   BP: 129/82   Pulse: 54   SpO2: 99%   Weight: 212 lb 9.6 oz (96.4 kg)   Height: 5' 10.98\" (1.803 m)     BP Readings from Last 3 Encounters:   02/14/20 129/82   01/15/20 120/74   12/06/19 128/74      Wt Readings from Last 3 Encounters:   02/14/20 212 lb 9.6 oz (96.4 kg)   01/15/20 184 lb (83.5 kg)   12/06/19 198 lb (89.8 kg)     General - alert, well appearing, and in no distress  Skin - normal coloration and turgor, no rashes  Eyes - pupils equal and reactive, extraocular eye movements intact  Mouth - mucous membranes moist  Neck - supple, no significant adenopathy, no palpable masses  Chest - clear to auscultation, symmetric air

## 2020-03-13 RX ORDER — DEXTROAMPHETAMINE SACCHARATE, AMPHETAMINE ASPARTATE MONOHYDRATE, DEXTROAMPHETAMINE SULFATE AND AMPHETAMINE SULFATE 2.5; 2.5; 2.5; 2.5 MG/1; MG/1; MG/1; MG/1
10 CAPSULE, EXTENDED RELEASE ORAL DAILY
Qty: 30 CAPSULE | Refills: 0 | Status: SHIPPED | OUTPATIENT
Start: 2020-03-13 | End: 2020-04-10 | Stop reason: SDUPTHER

## 2020-04-10 RX ORDER — ERGOCALCIFEROL 1.25 MG/1
50000 CAPSULE ORAL WEEKLY
Qty: 12 CAPSULE | Refills: 0 | Status: SHIPPED | OUTPATIENT
Start: 2020-04-10 | End: 2021-04-18

## 2020-04-10 RX ORDER — DEXTROAMPHETAMINE SACCHARATE, AMPHETAMINE ASPARTATE MONOHYDRATE, DEXTROAMPHETAMINE SULFATE AND AMPHETAMINE SULFATE 2.5; 2.5; 2.5; 2.5 MG/1; MG/1; MG/1; MG/1
10 CAPSULE, EXTENDED RELEASE ORAL DAILY
Qty: 30 CAPSULE | Refills: 0 | Status: SHIPPED | OUTPATIENT
Start: 2020-04-10 | End: 2020-05-15 | Stop reason: DRUGHIGH

## 2020-04-10 NOTE — TELEPHONE ENCOUNTER
Medication Requested:  Adderall  Last visit: 2/14/20  Next visit: 5/15/2020  Last refill: 3/13/20    Med contract on file:  [x] yes   [] no    Last urine drug screen: 12/5/19  Consistent with medication(s):    [] yes   [] no    Last OARRS ran: No     Quantity of medication remaining: unsure     Who will be picking rx up: Pt    Pharmacy if escribed: [facility]

## 2020-05-15 ENCOUNTER — TELEMEDICINE (OUTPATIENT)
Dept: INTERNAL MEDICINE CLINIC | Age: 30
End: 2020-05-15
Payer: COMMERCIAL

## 2020-05-15 VITALS — TEMPERATURE: 97.8 F | HEIGHT: 71 IN | BODY MASS INDEX: 28.98 KG/M2 | WEIGHT: 207 LBS

## 2020-05-15 PROCEDURE — G8427 DOCREV CUR MEDS BY ELIG CLIN: HCPCS | Performed by: PHYSICIAN ASSISTANT

## 2020-05-15 PROCEDURE — 99213 OFFICE O/P EST LOW 20 MIN: CPT | Performed by: PHYSICIAN ASSISTANT

## 2020-05-15 RX ORDER — METHYLPHENIDATE HYDROCHLORIDE 5 MG/1
TABLET ORAL
COMMUNITY
Start: 2011-02-14 | End: 2020-05-15 | Stop reason: ALTCHOICE

## 2020-05-15 RX ORDER — DEXTROAMPHETAMINE SACCHARATE, AMPHETAMINE ASPARTATE MONOHYDRATE, DEXTROAMPHETAMINE SULFATE AND AMPHETAMINE SULFATE 3.75; 3.75; 3.75; 3.75 MG/1; MG/1; MG/1; MG/1
15 CAPSULE, EXTENDED RELEASE ORAL DAILY
Qty: 30 CAPSULE | Refills: 0 | Status: SHIPPED | OUTPATIENT
Start: 2020-05-15 | End: 2020-05-19 | Stop reason: SDUPTHER

## 2020-05-15 RX ORDER — METHYLPHENIDATE HYDROCHLORIDE 10 MG/1
TABLET ORAL
COMMUNITY
Start: 2011-02-14 | End: 2020-05-15 | Stop reason: ALTCHOICE

## 2020-05-15 ASSESSMENT — ENCOUNTER SYMPTOMS
NAUSEA: 0
COLOR CHANGE: 0
VOMITING: 0
COUGH: 0
CHEST TIGHTNESS: 0
ABDOMINAL PAIN: 0
SHORTNESS OF BREATH: 0
BACK PAIN: 0

## 2020-05-19 ENCOUNTER — TELEPHONE (OUTPATIENT)
Dept: INTERNAL MEDICINE CLINIC | Age: 30
End: 2020-05-19

## 2020-05-19 RX ORDER — DEXTROAMPHETAMINE SACCHARATE, AMPHETAMINE ASPARTATE MONOHYDRATE, DEXTROAMPHETAMINE SULFATE AND AMPHETAMINE SULFATE 3.75; 3.75; 3.75; 3.75 MG/1; MG/1; MG/1; MG/1
15 CAPSULE, EXTENDED RELEASE ORAL DAILY
Qty: 30 CAPSULE | Refills: 0 | Status: SHIPPED | OUTPATIENT
Start: 2020-05-19 | End: 2020-06-16 | Stop reason: SDUPTHER

## 2020-06-16 RX ORDER — DEXTROAMPHETAMINE SACCHARATE, AMPHETAMINE ASPARTATE MONOHYDRATE, DEXTROAMPHETAMINE SULFATE AND AMPHETAMINE SULFATE 3.75; 3.75; 3.75; 3.75 MG/1; MG/1; MG/1; MG/1
15 CAPSULE, EXTENDED RELEASE ORAL DAILY
Qty: 30 CAPSULE | Refills: 0 | Status: SHIPPED | OUTPATIENT
Start: 2020-06-16 | End: 2020-07-15 | Stop reason: SDUPTHER

## 2020-07-15 RX ORDER — DEXTROAMPHETAMINE SACCHARATE, AMPHETAMINE ASPARTATE MONOHYDRATE, DEXTROAMPHETAMINE SULFATE AND AMPHETAMINE SULFATE 3.75; 3.75; 3.75; 3.75 MG/1; MG/1; MG/1; MG/1
15 CAPSULE, EXTENDED RELEASE ORAL DAILY
Qty: 30 CAPSULE | Refills: 0 | Status: SHIPPED | OUTPATIENT
Start: 2020-07-15 | End: 2020-08-17 | Stop reason: SDUPTHER

## 2020-08-17 RX ORDER — DEXTROAMPHETAMINE SACCHARATE, AMPHETAMINE ASPARTATE MONOHYDRATE, DEXTROAMPHETAMINE SULFATE AND AMPHETAMINE SULFATE 3.75; 3.75; 3.75; 3.75 MG/1; MG/1; MG/1; MG/1
15 CAPSULE, EXTENDED RELEASE ORAL DAILY
Qty: 30 CAPSULE | Refills: 0 | Status: SHIPPED | OUTPATIENT
Start: 2020-08-17 | End: 2020-09-14 | Stop reason: SDUPTHER

## 2020-08-17 NOTE — TELEPHONE ENCOUNTER
Medication Requested:  Adderall    Last visit: 05/15/20   Next visit: Visit date not found  Last refill: 07/15/20    Med contract on file:  [x] yes   [] no    Last urine drug screen: 10/15/19  Consistent with medication(s):    [] yes   [] no    Last OARRS ran: none    Quantity of medication remaining:     Who will be picking rx up:     Pharmacy if escribed: [facility]

## 2020-09-14 RX ORDER — DEXTROAMPHETAMINE SACCHARATE, AMPHETAMINE ASPARTATE MONOHYDRATE, DEXTROAMPHETAMINE SULFATE AND AMPHETAMINE SULFATE 3.75; 3.75; 3.75; 3.75 MG/1; MG/1; MG/1; MG/1
15 CAPSULE, EXTENDED RELEASE ORAL DAILY
Qty: 30 CAPSULE | Refills: 0 | Status: SHIPPED | OUTPATIENT
Start: 2020-09-14 | End: 2020-10-19 | Stop reason: SDUPTHER

## 2020-10-19 RX ORDER — DEXTROAMPHETAMINE SACCHARATE, AMPHETAMINE ASPARTATE MONOHYDRATE, DEXTROAMPHETAMINE SULFATE AND AMPHETAMINE SULFATE 3.75; 3.75; 3.75; 3.75 MG/1; MG/1; MG/1; MG/1
15 CAPSULE, EXTENDED RELEASE ORAL DAILY
Qty: 30 CAPSULE | Refills: 0 | Status: SHIPPED | OUTPATIENT
Start: 2020-10-19 | End: 2020-11-18 | Stop reason: SDUPTHER

## 2020-11-18 RX ORDER — DEXTROAMPHETAMINE SACCHARATE, AMPHETAMINE ASPARTATE MONOHYDRATE, DEXTROAMPHETAMINE SULFATE AND AMPHETAMINE SULFATE 3.75; 3.75; 3.75; 3.75 MG/1; MG/1; MG/1; MG/1
15 CAPSULE, EXTENDED RELEASE ORAL DAILY
Qty: 30 CAPSULE | Refills: 0 | Status: SHIPPED | OUTPATIENT
Start: 2020-11-18 | End: 2020-12-18 | Stop reason: SDUPTHER

## 2020-12-18 ENCOUNTER — OFFICE VISIT (OUTPATIENT)
Dept: INTERNAL MEDICINE CLINIC | Age: 30
End: 2020-12-18
Payer: COMMERCIAL

## 2020-12-18 VITALS
BODY MASS INDEX: 28.33 KG/M2 | HEART RATE: 81 BPM | OXYGEN SATURATION: 98 % | DIASTOLIC BLOOD PRESSURE: 74 MMHG | SYSTOLIC BLOOD PRESSURE: 122 MMHG | TEMPERATURE: 97 F | WEIGHT: 203 LBS

## 2020-12-18 PROCEDURE — 99213 OFFICE O/P EST LOW 20 MIN: CPT | Performed by: INTERNAL MEDICINE

## 2020-12-18 PROCEDURE — 1036F TOBACCO NON-USER: CPT | Performed by: INTERNAL MEDICINE

## 2020-12-18 PROCEDURE — G8484 FLU IMMUNIZE NO ADMIN: HCPCS | Performed by: INTERNAL MEDICINE

## 2020-12-18 PROCEDURE — G8427 DOCREV CUR MEDS BY ELIG CLIN: HCPCS | Performed by: INTERNAL MEDICINE

## 2020-12-18 PROCEDURE — G8431 POS CLIN DEPRES SCRN F/U DOC: HCPCS | Performed by: INTERNAL MEDICINE

## 2020-12-18 PROCEDURE — G8417 CALC BMI ABV UP PARAM F/U: HCPCS | Performed by: INTERNAL MEDICINE

## 2020-12-18 RX ORDER — DEXTROAMPHETAMINE SACCHARATE, AMPHETAMINE ASPARTATE MONOHYDRATE, DEXTROAMPHETAMINE SULFATE AND AMPHETAMINE SULFATE 3.75; 3.75; 3.75; 3.75 MG/1; MG/1; MG/1; MG/1
15 CAPSULE, EXTENDED RELEASE ORAL DAILY
Qty: 30 CAPSULE | Refills: 0 | Status: SHIPPED | OUTPATIENT
Start: 2020-12-18 | End: 2021-01-18 | Stop reason: SDUPTHER

## 2020-12-18 ASSESSMENT — ENCOUNTER SYMPTOMS
COLOR CHANGE: 0
DIARRHEA: 0
EYE REDNESS: 0
EYE ITCHING: 0
BACK PAIN: 0
ABDOMINAL DISTENTION: 0
SHORTNESS OF BREATH: 0
BLOOD IN STOOL: 0
EYE PAIN: 0
APNEA: 0
CHOKING: 0
ABDOMINAL PAIN: 0
COUGH: 0
CONSTIPATION: 0
EYE DISCHARGE: 0
CHEST TIGHTNESS: 0

## 2020-12-18 ASSESSMENT — PATIENT HEALTH QUESTIONNAIRE - PHQ9
2. FEELING DOWN, DEPRESSED OR HOPELESS: 2
SUM OF ALL RESPONSES TO PHQ QUESTIONS 1-9: 11
3. TROUBLE FALLING OR STAYING ASLEEP: 0
5. POOR APPETITE OR OVEREATING: 1
6. FEELING BAD ABOUT YOURSELF - OR THAT YOU ARE A FAILURE OR HAVE LET YOURSELF OR YOUR FAMILY DOWN: 1
8. MOVING OR SPEAKING SO SLOWLY THAT OTHER PEOPLE COULD HAVE NOTICED. OR THE OPPOSITE, BEING SO FIGETY OR RESTLESS THAT YOU HAVE BEEN MOVING AROUND A LOT MORE THAN USUAL: 1
SUM OF ALL RESPONSES TO PHQ9 QUESTIONS 1 & 2: 4
4. FEELING TIRED OR HAVING LITTLE ENERGY: 3
SUM OF ALL RESPONSES TO PHQ QUESTIONS 1-9: 11
9. THOUGHTS THAT YOU WOULD BE BETTER OFF DEAD, OR OF HURTING YOURSELF: 0
SUM OF ALL RESPONSES TO PHQ QUESTIONS 1-9: 11
7. TROUBLE CONCENTRATING ON THINGS, SUCH AS READING THE NEWSPAPER OR WATCHING TELEVISION: 1
1. LITTLE INTEREST OR PLEASURE IN DOING THINGS: 2

## 2020-12-18 NOTE — PROGRESS NOTES
Subjective:      Patient ID: Vasile Drake is a 35 y.o. male. HPI-patient is here for evaluation of ADHD. He has ADHD since he was in second grade, as per patient, he had testing done at that time, was started on Adderall, he in between, is off from Adderall, not taking Adderall again for last 1 year. Mention that his symptoms are very well controlled with Adderall  He was found to be positive for depression on PHQ-9 screening, does not want to be on any antidepression medication. No suicidal ideation    Review of Systems   Constitutional: Negative for activity change, appetite change, chills, diaphoresis, fatigue and fever. HENT: Negative for congestion, dental problem, drooling and ear discharge. Eyes: Negative for pain, discharge, redness and itching. Respiratory: Negative for apnea, cough, choking, chest tightness and shortness of breath. Cardiovascular: Negative for chest pain and leg swelling. Gastrointestinal: Negative for abdominal distention, abdominal pain, blood in stool, constipation and diarrhea. Endocrine: Negative for cold intolerance and heat intolerance. Genitourinary: Negative for difficulty urinating, dysuria, enuresis, flank pain and frequency. Musculoskeletal: Negative for arthralgias, back pain, gait problem and joint swelling. Skin: Negative for color change, pallor and rash. Neurological: Negative for dizziness, facial asymmetry, light-headedness, numbness and headaches. Psychiatric/Behavioral: Negative for agitation, behavioral problems, confusion, decreased concentration and dysphoric mood. Objective:   Physical Exam  Vitals signs and nursing note reviewed. Constitutional:       General: He is not in acute distress. Appearance: He is well-developed. He is not diaphoretic. HENT:      Head: Normocephalic and atraumatic. Mouth/Throat:      Pharynx: No oropharyngeal exudate. Eyes:      General: No scleral icterus. Right eye: No discharge. Left eye: No discharge. Conjunctiva/sclera: Conjunctivae normal.      Pupils: Pupils are equal, round, and reactive to light. Neck:      Musculoskeletal: Normal range of motion and neck supple. Thyroid: No thyromegaly. Vascular: No JVD. Trachea: No tracheal deviation. Cardiovascular:      Rate and Rhythm: Normal rate. Heart sounds: Normal heart sounds. No murmur. No gallop. Pulmonary:      Effort: Pulmonary effort is normal. No respiratory distress. Breath sounds: Normal breath sounds. No stridor. No wheezing or rales. Abdominal:      General: Bowel sounds are normal. There is no distension. Palpations: Abdomen is soft. Tenderness: There is no abdominal tenderness. There is no guarding or rebound. Musculoskeletal: Normal range of motion. General: No tenderness. Skin:     General: Skin is warm and dry. Findings: No erythema or rash. Neurological:      Mental Status: He is alert and oriented to person, place, and time. Assessment / Plan:   1. Moderate recurrent major depression ([facility] [address]  Does not want to be on medication for depression at this time, will call office, if need arise  2. Mild episode of recurrent major depressive disorder ([address]    3. Attention deficit hyperactivity disorder (ADHD), predominantly inattentive type  - amphetamine-dextroamphetamine (ADDERALL XR) 15 MG extended release capsule; Take 1 capsule by mouth daily for 30 days. Dispense: 30 capsule; Refill: 0    4. Vitamin D deficiency  - Vitamin D 25 Hydroxy; Future    5. Positive depression screening    - Positive Screen for Clinical Depression with a Documented Follow-up Plan       Return in about 6 months (around 6/18/2021). · Reviewed prior labs and health maintenance. On the basis of positive PHQ-9 screening (PHQ-9 Total Score: 11), the following plan was implemented: patient declines further evaluation/treatment for depression. Patient will follow-up in 4 month(s) with PCP.

## 2021-02-15 DIAGNOSIS — F90.0 ATTENTION DEFICIT HYPERACTIVITY DISORDER (ADHD), PREDOMINANTLY INATTENTIVE TYPE: ICD-10-CM

## 2021-02-17 RX ORDER — DEXTROAMPHETAMINE SACCHARATE, AMPHETAMINE ASPARTATE MONOHYDRATE, DEXTROAMPHETAMINE SULFATE AND AMPHETAMINE SULFATE 3.75; 3.75; 3.75; 3.75 MG/1; MG/1; MG/1; MG/1
15 CAPSULE, EXTENDED RELEASE ORAL DAILY
Qty: 30 CAPSULE | Refills: 0 | Status: SHIPPED | OUTPATIENT
Start: 2021-02-17 | End: 2021-03-20 | Stop reason: SDUPTHER

## 2021-03-20 DIAGNOSIS — F90.0 ATTENTION DEFICIT HYPERACTIVITY DISORDER (ADHD), PREDOMINANTLY INATTENTIVE TYPE: ICD-10-CM

## 2021-03-25 RX ORDER — DEXTROAMPHETAMINE SACCHARATE, AMPHETAMINE ASPARTATE MONOHYDRATE, DEXTROAMPHETAMINE SULFATE AND AMPHETAMINE SULFATE 3.75; 3.75; 3.75; 3.75 MG/1; MG/1; MG/1; MG/1
15 CAPSULE, EXTENDED RELEASE ORAL DAILY
Qty: 30 CAPSULE | Refills: 0 | Status: SHIPPED | OUTPATIENT
Start: 2021-03-25 | End: 2021-04-08

## 2021-03-25 NOTE — TELEPHONE ENCOUNTER
Patient called to check on the status of refill, informed him that I will contact him as soon as message is addressed .

## 2021-04-08 ENCOUNTER — OFFICE VISIT (OUTPATIENT)
Dept: INTERNAL MEDICINE CLINIC | Age: 31
End: 2021-04-08
Payer: MEDICARE

## 2021-04-08 VITALS
HEART RATE: 78 BPM | HEIGHT: 71 IN | BODY MASS INDEX: 29.54 KG/M2 | DIASTOLIC BLOOD PRESSURE: 74 MMHG | SYSTOLIC BLOOD PRESSURE: 126 MMHG | WEIGHT: 211 LBS | TEMPERATURE: 98.2 F | OXYGEN SATURATION: 97 %

## 2021-04-08 DIAGNOSIS — F90.0 ATTENTION DEFICIT HYPERACTIVITY DISORDER (ADHD), PREDOMINANTLY INATTENTIVE TYPE: ICD-10-CM

## 2021-04-08 DIAGNOSIS — E55.9 VITAMIN D DEFICIENCY: ICD-10-CM

## 2021-04-08 DIAGNOSIS — F33.1 MODERATE RECURRENT MAJOR DEPRESSION (HCC): Primary | ICD-10-CM

## 2021-04-08 PROCEDURE — G8417 CALC BMI ABV UP PARAM F/U: HCPCS | Performed by: NURSE PRACTITIONER

## 2021-04-08 PROCEDURE — 99213 OFFICE O/P EST LOW 20 MIN: CPT | Performed by: NURSE PRACTITIONER

## 2021-04-08 PROCEDURE — 1036F TOBACCO NON-USER: CPT | Performed by: NURSE PRACTITIONER

## 2021-04-08 PROCEDURE — G8427 DOCREV CUR MEDS BY ELIG CLIN: HCPCS | Performed by: NURSE PRACTITIONER

## 2021-04-08 RX ORDER — DEXTROAMPHETAMINE SACCHARATE, AMPHETAMINE ASPARTATE MONOHYDRATE, DEXTROAMPHETAMINE SULFATE AND AMPHETAMINE SULFATE 5; 5; 5; 5 MG/1; MG/1; MG/1; MG/1
20 CAPSULE, EXTENDED RELEASE ORAL EVERY MORNING
Qty: 30 CAPSULE | Refills: 0 | Status: SHIPPED | OUTPATIENT
Start: 2021-04-08 | End: 2021-05-04 | Stop reason: SDUPTHER

## 2021-04-08 ASSESSMENT — PATIENT HEALTH QUESTIONNAIRE - PHQ9
SUM OF ALL RESPONSES TO PHQ QUESTIONS 1-9: 1
SUM OF ALL RESPONSES TO PHQ QUESTIONS 1-9: 1

## 2021-04-08 NOTE — PROGRESS NOTES
ILLNESS:      Chief Complaint   Patient presents with    Anxiety     follow up        History was obtained from the patient. Denise Paris is a 35 y.o. male here today for routine medical follow up for ADHD and anxiety. Patient Active Problem List   Diagnosis    High risk medication use    ADD (attention deficit disorder)    Vitamin D deficiency    Chronic fatigue    Mild episode of recurrent major depressive disorder ([facility] [address]    BMI 27.0-27.9,adult    Nephrolithiasis    Epididymitis, left    Anxiety    Moderate recurrent major depression ([facility] [address]       Health Maintenance Due   Topic Date Due    Hepatitis C screen  Never done    HIV screen  Never done    COVID-19 Vaccine (1) Never done    DTaP/Tdap/Td vaccine (7 - Td) 09/03/2014       MEDICATIONS:      Current Outpatient Medications   Medication Sig Dispense Refill    amphetamine-dextroamphetamine (ADDERALL XR) 20 MG extended release capsule Take 1 capsule by mouth every morning for 30 days. 30 capsule 0    vitamin D (ERGOCALCIFEROL) 1.25 MG (65164 UT) CAPS capsule Take 1 capsule by mouth once a week (Patient not taking: Reported on 4/8/2021) 12 capsule 0     No current facility-administered medications for this visit. ALLERGIES:    No Known Allergies      SOCIAL HISTORY    Reviewed and no change from previous record. Taurus  reports that he has never smoked. He has never used smokeless tobacco.    FAMILY HISTORY:    Reviewed and No change from previous visit    REVIEW OF SYSTEMS:    Review of Systems   Constitutional: Negative for appetite change, diaphoresis, fatigue, fever and unexpected weight change. HENT: Negative for ear pain, postnasal drip, rhinorrhea, sinus pain, sore throat and trouble swallowing. Eyes: Negative for visual disturbance. Respiratory: Negative for cough, chest tightness, shortness of breath and wheezing. Cardiovascular: Negative for chest pain, palpitations and leg swelling.    Gastrointestinal: Negative for abdominal pain, blood in stool, constipation, diarrhea, nausea and vomiting. Endocrine: Negative for polydipsia, polyphagia and polyuria. Genitourinary: Negative for difficulty urinating, dysuria and flank pain. Musculoskeletal: Negative for arthralgias and myalgias. Skin: Negative for rash and wound. Neurological: Negative for dizziness, syncope and weakness. Psychiatric/Behavioral: The patient is nervous/anxious. All other systems reviewed and are negative. PHYSICAL EXAM:      Vitals:    04/08/21 0902   BP: 126/74   Pulse: 78   Temp: 98.2 °F (36.8 °C)   SpO2: 97%   Weight: 211 lb (95.7 kg)   Height: 5' 10.98\" (1.803 m)     BP Readings from Last 3 Encounters:   04/08/21 126/74   12/18/20 122/74   02/14/20 129/82      Wt Readings from Last 3 Encounters:   04/08/21 211 lb (95.7 kg)   12/18/20 203 lb (92.1 kg)   05/15/20 207 lb (93.9 kg)       Physical Exam  Vitals signs reviewed. Constitutional:       Appearance: Normal appearance. HENT:      Head: Normocephalic. Cardiovascular:      Rate and Rhythm: Normal rate and regular rhythm. Pulses: Normal pulses. Heart sounds: Normal heart sounds. Pulmonary:      Effort: Pulmonary effort is normal.      Breath sounds: Normal breath sounds. Abdominal:      General: Bowel sounds are normal.      Palpations: Abdomen is soft. Musculoskeletal: Normal range of motion. General: No swelling, tenderness or deformity. Skin:     General: Skin is warm and dry. Neurological:      General: No focal deficit present. Mental Status: He is alert and oriented to person, place, and time. Psychiatric:         Mood and Affect: Mood is anxious. Behavior: Behavior normal.         Thought Content:  Thought content normal.         Judgment: Judgment normal.          LABORATORY FINDINGS:    CBC:  Lab Results   Component Value Date    WBC 10.0 12/05/2019    WBC 3 12/05/2019    WBC 9.5 02/27/2016    HGB 14.0 12/05/2019

## 2021-04-18 ASSESSMENT — ENCOUNTER SYMPTOMS
NAUSEA: 0
COUGH: 0
VOMITING: 0
SINUS PAIN: 0
SHORTNESS OF BREATH: 0
DIARRHEA: 0
ABDOMINAL PAIN: 0
WHEEZING: 0
RHINORRHEA: 0
CHEST TIGHTNESS: 0
BLOOD IN STOOL: 0
SORE THROAT: 0
CONSTIPATION: 0
TROUBLE SWALLOWING: 0

## 2021-05-04 DIAGNOSIS — F90.0 ATTENTION DEFICIT HYPERACTIVITY DISORDER (ADHD), PREDOMINANTLY INATTENTIVE TYPE: ICD-10-CM

## 2021-05-04 RX ORDER — DEXTROAMPHETAMINE SACCHARATE, AMPHETAMINE ASPARTATE MONOHYDRATE, DEXTROAMPHETAMINE SULFATE AND AMPHETAMINE SULFATE 5; 5; 5; 5 MG/1; MG/1; MG/1; MG/1
20 CAPSULE, EXTENDED RELEASE ORAL EVERY MORNING
Qty: 30 CAPSULE | Refills: 0 | Status: SHIPPED | OUTPATIENT
Start: 2021-05-04 | End: 2021-06-09 | Stop reason: SDUPTHER

## 2021-05-18 ENCOUNTER — HOSPITAL ENCOUNTER (OUTPATIENT)
Age: 31
Setting detail: SPECIMEN
Discharge: HOME | End: 2021-05-18
Payer: MEDICARE

## 2021-05-18 DIAGNOSIS — E55.9 VITAMIN D DEFICIENCY: ICD-10-CM

## 2021-05-18 DIAGNOSIS — F90.0 ATTENTION DEFICIT HYPERACTIVITY DISORDER (ADHD), PREDOMINANTLY INATTENTIVE TYPE: ICD-10-CM

## 2021-05-18 LAB
ABSOLUTE EOS #: 0.07 K/UL (ref 0–0.44)
ABSOLUTE IMMATURE GRANULOCYTE: <0.03 K/UL (ref 0–0.3)
ABSOLUTE LYMPH #: 1.41 K/UL (ref 1.1–3.7)
ABSOLUTE MONO #: 0.49 K/UL (ref 0.1–1.2)
ALBUMIN SERPL-MCNC: 4.5 G/DL (ref 3.5–5.2)
ALBUMIN/GLOBULIN RATIO: 1.5 (ref 1–2.5)
ALP BLD-CCNC: 42 U/L (ref 40–129)
ALT SERPL-CCNC: 22 U/L (ref 5–41)
AMPHETAMINE SCREEN URINE: POSITIVE
ANION GAP SERPL CALCULATED.3IONS-SCNC: 13 MMOL/L (ref 9–17)
AST SERPL-CCNC: 23 U/L
BARBITURATE SCREEN URINE: NEGATIVE
BASOPHILS # BLD: 1 % (ref 0–2)
BASOPHILS ABSOLUTE: 0.03 K/UL (ref 0–0.2)
BENZODIAZEPINE SCREEN, URINE: NEGATIVE
BILIRUB SERPL-MCNC: 0.63 MG/DL (ref 0.3–1.2)
BILIRUBIN URINE: NEGATIVE
BUN BLDV-MCNC: 8 MG/DL (ref 6–20)
BUN/CREAT BLD: NORMAL (ref 9–20)
BUPRENORPHINE URINE: ABNORMAL
CALCIUM SERPL-MCNC: 9.4 MG/DL (ref 8.6–10.4)
CANNABINOID SCREEN URINE: POSITIVE
CHLORIDE BLD-SCNC: 103 MMOL/L (ref 98–107)
CO2: 24 MMOL/L (ref 20–31)
COCAINE METABOLITE, URINE: NEGATIVE
COLOR: YELLOW
COMMENT UA: NORMAL
CREAT SERPL-MCNC: 0.8 MG/DL (ref 0.7–1.2)
DIFFERENTIAL TYPE: ABNORMAL
EOSINOPHILS RELATIVE PERCENT: 1 % (ref 1–4)
GFR AFRICAN AMERICAN: >60 ML/MIN
GFR NON-AFRICAN AMERICAN: >60 ML/MIN
GFR SERPL CREATININE-BSD FRML MDRD: NORMAL ML/MIN/{1.73_M2}
GFR SERPL CREATININE-BSD FRML MDRD: NORMAL ML/MIN/{1.73_M2}
GLUCOSE BLD-MCNC: 88 MG/DL (ref 70–99)
GLUCOSE URINE: NEGATIVE
HCT VFR BLD CALC: 47.7 % (ref 40.7–50.3)
HEMOGLOBIN: 14.2 G/DL (ref 13–17)
IMMATURE GRANULOCYTES: 0 %
KETONES, URINE: NEGATIVE
LEUKOCYTE ESTERASE, URINE: NEGATIVE
LYMPHOCYTES # BLD: 22 % (ref 24–43)
MCH RBC QN AUTO: 22.8 PG (ref 25.2–33.5)
MCHC RBC AUTO-ENTMCNC: 29.8 G/DL (ref 28.4–34.8)
MCV RBC AUTO: 76.7 FL (ref 82.6–102.9)
MDMA URINE: ABNORMAL
METHADONE SCREEN, URINE: NEGATIVE
METHAMPHETAMINE, URINE: ABNORMAL
MONOCYTES # BLD: 8 % (ref 3–12)
NITRITE, URINE: NEGATIVE
NRBC AUTOMATED: 0 PER 100 WBC
OPIATES, URINE: NEGATIVE
OXYCODONE SCREEN URINE: NEGATIVE
PDW BLD-RTO: 16.1 % (ref 11.8–14.4)
PH UA: 7 (ref 5–8)
PHENCYCLIDINE, URINE: NEGATIVE
PLATELET # BLD: 236 K/UL (ref 138–453)
PLATELET ESTIMATE: ABNORMAL
PMV BLD AUTO: 10.9 FL (ref 8.1–13.5)
POTASSIUM SERPL-SCNC: 4.7 MMOL/L (ref 3.7–5.3)
PROPOXYPHENE, URINE: ABNORMAL
PROTEIN UA: NEGATIVE
RBC # BLD: 6.22 M/UL (ref 4.21–5.77)
RBC # BLD: ABNORMAL 10*6/UL
SEG NEUTROPHILS: 68 % (ref 36–65)
SEGMENTED NEUTROPHILS ABSOLUTE COUNT: 4.51 K/UL (ref 1.5–8.1)
SODIUM BLD-SCNC: 140 MMOL/L (ref 135–144)
SPECIFIC GRAVITY UA: 1.01 (ref 1–1.03)
TEST INFORMATION: ABNORMAL
TOTAL PROTEIN: 7.6 G/DL (ref 6.4–8.3)
TRICYCLIC ANTIDEPRESSANTS, UR: ABNORMAL
TURBIDITY: CLEAR
URINE HGB: NEGATIVE
UROBILINOGEN, URINE: NORMAL
VITAMIN D 25-HYDROXY: 15.4 NG/ML (ref 30–100)
WBC # BLD: 6.5 K/UL (ref 3.5–11.3)
WBC # BLD: ABNORMAL 10*3/UL

## 2021-05-21 DIAGNOSIS — E55.9 VITAMIN D DEFICIENCY: Primary | ICD-10-CM

## 2021-05-21 RX ORDER — ERGOCALCIFEROL 1.25 MG/1
50000 CAPSULE ORAL WEEKLY
Qty: 4 CAPSULE | Refills: 5 | Status: SHIPPED | OUTPATIENT
Start: 2021-05-21 | End: 2021-06-21 | Stop reason: SDUPTHER

## 2021-06-09 DIAGNOSIS — F90.0 ATTENTION DEFICIT HYPERACTIVITY DISORDER (ADHD), PREDOMINANTLY INATTENTIVE TYPE: ICD-10-CM

## 2021-06-09 RX ORDER — DEXTROAMPHETAMINE SACCHARATE, AMPHETAMINE ASPARTATE MONOHYDRATE, DEXTROAMPHETAMINE SULFATE AND AMPHETAMINE SULFATE 5; 5; 5; 5 MG/1; MG/1; MG/1; MG/1
20 CAPSULE, EXTENDED RELEASE ORAL EVERY MORNING
Qty: 30 CAPSULE | Refills: 0 | Status: SHIPPED | OUTPATIENT
Start: 2021-06-09 | End: 2021-07-06 | Stop reason: SDUPTHER

## 2021-06-16 ENCOUNTER — OFFICE VISIT (OUTPATIENT)
Dept: INTERNAL MEDICINE CLINIC | Age: 31
End: 2021-06-16
Payer: MEDICARE

## 2021-06-16 VITALS
WEIGHT: 202 LBS | SYSTOLIC BLOOD PRESSURE: 124 MMHG | BODY MASS INDEX: 28.28 KG/M2 | OXYGEN SATURATION: 99 % | HEART RATE: 74 BPM | DIASTOLIC BLOOD PRESSURE: 72 MMHG | HEIGHT: 71 IN

## 2021-06-16 DIAGNOSIS — F33.1 MODERATE RECURRENT MAJOR DEPRESSION (HCC): Primary | ICD-10-CM

## 2021-06-16 DIAGNOSIS — M25.561 ACUTE PAIN OF RIGHT KNEE: ICD-10-CM

## 2021-06-16 DIAGNOSIS — F41.9 ANXIETY: ICD-10-CM

## 2021-06-16 PROCEDURE — 1036F TOBACCO NON-USER: CPT | Performed by: NURSE PRACTITIONER

## 2021-06-16 PROCEDURE — G8417 CALC BMI ABV UP PARAM F/U: HCPCS | Performed by: NURSE PRACTITIONER

## 2021-06-16 PROCEDURE — 99214 OFFICE O/P EST MOD 30 MIN: CPT | Performed by: NURSE PRACTITIONER

## 2021-06-16 PROCEDURE — G8427 DOCREV CUR MEDS BY ELIG CLIN: HCPCS | Performed by: NURSE PRACTITIONER

## 2021-06-16 RX ORDER — CITALOPRAM 10 MG/1
10 TABLET ORAL DAILY
Qty: 30 TABLET | Refills: 3 | Status: SHIPPED | OUTPATIENT
Start: 2021-06-16 | End: 2021-07-21 | Stop reason: SDUPTHER

## 2021-06-16 SDOH — ECONOMIC STABILITY: TRANSPORTATION INSECURITY
IN THE PAST 12 MONTHS, HAS LACK OF TRANSPORTATION KEPT YOU FROM MEETINGS, WORK, OR FROM GETTING THINGS NEEDED FOR DAILY LIVING?: NO

## 2021-06-16 SDOH — ECONOMIC STABILITY: FOOD INSECURITY: WITHIN THE PAST 12 MONTHS, YOU WORRIED THAT YOUR FOOD WOULD RUN OUT BEFORE YOU GOT MONEY TO BUY MORE.: NEVER TRUE

## 2021-06-16 SDOH — ECONOMIC STABILITY: FOOD INSECURITY: WITHIN THE PAST 12 MONTHS, THE FOOD YOU BOUGHT JUST DIDN'T LAST AND YOU DIDN'T HAVE MONEY TO GET MORE.: NEVER TRUE

## 2021-06-16 SDOH — ECONOMIC STABILITY: TRANSPORTATION INSECURITY
IN THE PAST 12 MONTHS, HAS THE LACK OF TRANSPORTATION KEPT YOU FROM MEDICAL APPOINTMENTS OR FROM GETTING MEDICATIONS?: NO

## 2021-06-16 ASSESSMENT — ENCOUNTER SYMPTOMS
SINUS PAIN: 0
BLOOD IN STOOL: 0
DIARRHEA: 0
RHINORRHEA: 0
COUGH: 0
NAUSEA: 0
ABDOMINAL PAIN: 0
SORE THROAT: 0
CONSTIPATION: 0
WHEEZING: 0
VOMITING: 0
SHORTNESS OF BREATH: 0
CHEST TIGHTNESS: 0
TROUBLE SWALLOWING: 0

## 2021-06-16 ASSESSMENT — PATIENT HEALTH QUESTIONNAIRE - PHQ9
SUM OF ALL RESPONSES TO PHQ9 QUESTIONS 1 & 2: 2
SUM OF ALL RESPONSES TO PHQ QUESTIONS 1-9: 2
SUM OF ALL RESPONSES TO PHQ QUESTIONS 1-9: 2
1. LITTLE INTEREST OR PLEASURE IN DOING THINGS: 1
SUM OF ALL RESPONSES TO PHQ QUESTIONS 1-9: 2
2. FEELING DOWN, DEPRESSED OR HOPELESS: 1

## 2021-06-16 ASSESSMENT — SOCIAL DETERMINANTS OF HEALTH (SDOH): HOW HARD IS IT FOR YOU TO PAY FOR THE VERY BASICS LIKE FOOD, HOUSING, MEDICAL CARE, AND HEATING?: NOT HARD AT ALL

## 2021-06-16 NOTE — PROGRESS NOTES
Visit Information    Have you changed or started any medications since your last visit including any over-the-counter medicines, vitamins, or herbal medicines? no   Are you having any side effects from any of your medications? -  no  Have you stopped taking any of your medications? Is so, why? -  no    Have you seen any other physician or provider since your last visit? No  Have you had any other diagnostic tests since your last visit? Yes - Records Obtained  Have you been seen in the emergency room and/or had an admission to a hospital since we last saw you? No  Have you had your routine dental cleaning in the past 6 months? no    Have you activated your [facility] account? If not, what are your barriers?  Yes     Patient Care Team:  EUSEBIO Watkins CNP as PCP - General (Nurse Practitioner)  EUSEBIO Watkins CNP as PCP - [facility]    Medical History Review  Past Medical, Family, and Social History reviewed and does not contribute to the patient presenting condition    Health Maintenance   Topic Date Due    Hepatitis C screen  Never done    COVID-19 Vaccine (1) Never done    HIV screen  Never done    DTaP/Tdap/Td vaccine (7 - Td or Tdap) 09/03/2014    Flu vaccine (Season Ended) 09/01/2021    Hepatitis B vaccine  Completed    Hib vaccine  Completed    Varicella vaccine  Completed    Hepatitis A vaccine  Aged Out    Meningococcal (ACWY) vaccine  Aged Out    Pneumococcal 0-64 years Vaccine  Aged Out         [address] [address]  Dept: [phone]  Dept Fax: [phone]    OfficeProgress/Follow Up Note  Date of patient's visit: 6/16/2021  Patient's Name:  Colonel Slater YOB: 1990            Patient Care Team:  EUSEBIO Watkins CNP as PCP - General (Nurse Practitioner)  EUSEBIO Watkins CNP as PCP - [facility]    REASON FOR VISIT:  Routine outpatient follow up    HISTORY OF PRESENT ILLNESS:        History was obtained from the patient. Gaby Ellis is a 35 y.o. male here today for Depression (4 week fu) and Anxiety (4 week fu)    Knee Pain   The incident occurred more than 1 week ago. The injury mechanism was a fall (lateral fall onto knee while playing football). The pain is present in the right knee. The quality of the pain is described as shooting and stabbing. The pain is at a severity of 0/10 (walking 2-3 pain, play football pain is characterized as a 6-7). The pain is mild (severe depending on how he's playing on it). The pain has been constant since onset. Pertinent negatives include no inability to bear weight, loss of motion, loss of sensation, muscle weakness, numbness or tingling. He reports no foreign bodies present. The symptoms are aggravated by movement and weight bearing (playing football or volleyball). He has tried NSAIDs and acetaminophen (patient has tired Motrin for 1st week but nothing since) for the symptoms. The treatment provided moderate relief. Anxiety   Reporting increased obsessive thoughts, decreased concentration  Occurring a couple times per week, episodes lasting 45 minutes to 1 hour  Denies panic attacks, CP, SOB or heart palpitations    Depression  Endorses intermittent anhedonia, increased sadness   Occurring twice per week  Passive thoughts of hurting self. Endorses thinking of specific ways but states, \"I hate pain\" denies acting on thoughts of self harm. Endorses increased stress at work and difficulty sleeping at night  Denies HI/AH/VH or paranoia. Patient has followed with a psychiatrist in the past for anxiety and depression last year.      Patient Active Problem List   Diagnosis    High risk medication use    ADD (attention deficit disorder)    Vitamin D deficiency    Chronic fatigue    [facility] of [facility]    BMI 27.0-27.9,adult    Nephrolithiasis    Epididymitis, left    Anxiety    Moderate recurrent major depression ([facility] [address]       Health Maintenance Due   Topic Date Due    Hepatitis C screen  Never done    COVID-19 Vaccine (1) Never done    HIV screen  Never done    DTaP/Tdap/Td vaccine (7 - Td or Tdap) 09/03/2014       MEDICATIONS:      Current Outpatient Medications   Medication Sig Dispense Refill    citalopram (CELEXA) 10 MG tablet Take 1 tablet by mouth daily 30 tablet 3    amphetamine-dextroamphetamine (ADDERALL XR) 20 MG extended release capsule Take 1 capsule by mouth every morning for 30 days. 30 capsule 0    vitamin D (ERGOCALCIFEROL) 1.25 MG (66288 UT) CAPS capsule Take 1 capsule by mouth once a week 4 capsule 5     No current facility-administered medications for this visit. ALLERGIES:    No Known Allergies      SOCIAL HISTORY    Reviewed and no change from previous record. Taurus  reports that he has never smoked. He has never used smokeless tobacco.    FAMILY HISTORY:    Reviewed and No change from previous visit    REVIEW OF SYSTEMS:    Review of Systems   Constitutional: Negative for appetite change, diaphoresis, fatigue, fever and unexpected weight change. HENT: Negative for ear pain, postnasal drip, rhinorrhea, sinus pain, sore throat and trouble swallowing. Eyes: Negative for visual disturbance. Respiratory: Negative for cough, chest tightness, shortness of breath and wheezing. Cardiovascular: Negative for chest pain, palpitations and leg swelling. Gastrointestinal: Negative for abdominal pain, blood in stool, constipation, diarrhea, nausea and vomiting. Endocrine: Negative for polydipsia, polyphagia and polyuria. Genitourinary: Negative for difficulty urinating, dysuria and flank pain. Musculoskeletal: Positive for arthralgias (R knee pain). Negative for myalgias. Skin: Negative for rash and wound. Neurological: Negative for dizziness, tingling, syncope, weakness and numbness.    Psychiatric/Behavioral: Positive for decreased concentration, dysphoric mood, sleep disturbance and suicidal ideas. Negative for hallucinations and self-injury. The patient is nervous/anxious. The patient is not hyperactive. All other systems reviewed and are negative. PHYSICAL EXAM:      Vitals:    06/16/21 0906   BP: 124/72   Pulse: 74   SpO2: 99%   Weight: 202 lb (91.6 kg)   Height: 5' 10.98\" (1.803 m)     BP Readings from Last 3 Encounters:   06/16/21 124/72   04/08/21 126/74   12/18/20 122/74      Wt Readings from Last 3 Encounters:   06/16/21 202 lb (91.6 kg)   04/08/21 211 lb (95.7 kg)   12/18/20 203 lb (92.1 kg)       Physical Exam  Vitals reviewed. Constitutional:       Appearance: Normal appearance. HENT:      Head: Normocephalic. Cardiovascular:      Rate and Rhythm: Normal rate and regular rhythm. Pulses: Normal pulses. Heart sounds: Normal heart sounds. Pulmonary:      Effort: Pulmonary effort is normal.      Breath sounds: Normal breath sounds. Abdominal:      General: Bowel sounds are normal.      Palpations: Abdomen is soft. Musculoskeletal:         General: No swelling, tenderness or deformity. Normal range of motion. Skin:     General: Skin is warm and dry. Neurological:      General: No focal deficit present. Mental Status: He is alert and oriented to person, place, and time. Psychiatric:         Attention and Perception: He is attentive. He does not perceive auditory or visual hallucinations. Mood and Affect: Affect normal. Mood is anxious. Speech: Speech normal.         Behavior: Behavior normal.         Thought Content: Thought content is not paranoid or delusional. Thought content includes suicidal (passive thoughts, denies plan of self harm) ideation. Thought content does not include homicidal ideation. Thought content does not include homicidal or suicidal plan.          Judgment: Judgment normal.          LABORATORY FINDINGS:    CBC:  Lab Results   Component Value Date    WBC 6.5 05/18/2021    HGB 14.2 05/18/2021     05/18/2021       BMP:    Lab Results   Component Value Date     05/18/2021    K 4.7 05/18/2021     05/18/2021    CO2 24 05/18/2021    BUN 8 05/18/2021    CREATININE 0.80 05/18/2021    GLUCOSE 88 05/18/2021    GLUCOSE 90 12/05/2019       HEMOGLOBIN A1C: No results found for: LABA1C    FASTING LIPID PANEL:  Lab Results   Component Value Date    CHOL 161 12/05/2019    HDL 42 12/05/2019    TRIG 75 12/05/2019       ASSESSMENT AND PLAN:      1. Moderate recurrent major depression ([facility] [address]  - endorses intermittent suicidal thoughts, denies plan or intention to act on thoughts.   - instructed to go to the nearest ED for SI/HI or a plan of self harm, patient verbalized understanding  - citalopram (CELEXA) 10 MG tablet; Take 1 tablet by mouth daily  Dispense: 30 tablet; Refill: 3  - monitor for serotonin syndrome (since he is on Adderall  for ADHD, patient educated on signs of serotonin syndrome)  - [facility]    2. Anxiety  - citalopram (CELEXA) 10 MG tablet; Take 1 tablet by mouth daily  Dispense: 30 tablet; Refill: 3  - [facility]    3. Acute pain of right knee  - encouraged to continue supportive care measures (rest, heat, stretches, Tylenol/Motrin for pain)  - Domenic Briones MD, Orthopedic Surgery, [place] (referred per patient request) Patient encouraged to try supportive care measures first for pain management. - XR KNEE RIGHT (3 VIEWS); Future      FOLLOW UP AND INSTRUCTIONS:   Return in about 6 weeks (around 7/28/2021), or if symptoms worsen or fail to improve, for anxiety/depression. Taurus received counseling on the following healthy behaviors: nutrition, exercise and medication adherence    Discussed use, benefit, and side effects of prescribed medications. Barriers to medication compliance addressed. All patient questions answered. Patient voiced understanding.      Patient given educational materials - see patient instructions    EUSEBIO Gann - [facility] CHAMBERSFreeman Orthopa[facility] & [facility]  6/16/2021, 10:30 AM    Please note that this chart was generated using voice recognition Dragon dictation software. Although everyeffort was made to ensure the accuracy of this automated transcription, some errors in transcription may have occurred.

## 2021-06-16 NOTE — PATIENT INSTRUCTIONS
Patient Education        citalopram  Pronunciation:  amanda carbajal prasumeet  Brand:  Stacy Kasper  What is the most important information I should know about citalopram?  You should not use citalopram if you also take pimozide, or if you are being treated with methylene blue injection. Do not use this medicine if you have used an MAO inhibitor in the past 14 days, such as isocarboxazid, linezolid, methylene blue injection, phenelzine, rasagiline, selegiline, or tranylcypromine. Some young people have thoughts about suicide when first taking an antidepressant. Stay alert to changes in your mood or symptoms. Report any new or worsening symptoms to your doctor. Citalopram is not approved for use in children. What is citalopram?  Citalopram is an antidepressant in a group of drugs called selective serotonin reuptake inhibitors (SSRIs). Citalopram is used to treat depression. Citalopram may also be used for purposes not listed in this medication guide. What should I discuss with my healthcare provider before taking citalopram?  You should not use this medicine if you are allergic to citalopram or escitalopram (Lexapro), or if you also take pimozide. Do not use citalopram if you have used an MAO inhibitor in the past 14 days. A dangerous drug interaction could occur. MAO inhibitors include isocarboxazid, linezolid, methylene blue injection, phenelzine, rasagiline, selegiline, tranylcypromine, and others.   To make sure citalopram is safe for you, tell your doctor if you have:  · a bleeding or blood clotting disorder;  · liver or kidney disease;  · narrow-angle glaucoma;  · seizures or epilepsy;  · heart disease, heart failure, a heart rhythm disorder, slow heartbeats, or recent history of heart attack;  · personal or family history of Long QT syndrome;  · an electrolyte imbalance (such as low levels of potassium or magnesium in your blood);  · bipolar disorder (manic depression); or  · a history of drug abuse or suicidal thoughts. Some young people have thoughts about suicide when first taking an antidepressant. Your doctor should check your progress at regular visits. Your family or other caregivers should also be alert to changes in your mood or symptoms. Taking an SSRI antidepressant during pregnancy may cause serious lung problems or other complications in the baby. However, you may have a relapse of depression if you stop taking your antidepressant. Tell your doctor right away if you become pregnant. Do not start or stop taking this medicine during pregnancy without your doctor's advice. Citalopram can pass into breast milk and may harm a nursing baby. You should not breast-feed while you are using citalopram.  Do not give this medicine to anyone under 25years old without medical advice. Citalopram is not approved for use in children. How should I take citalopram?  Follow all directions on your prescription label. Your doctor may occasionally change your dose. Do not use this medicine in larger or smaller amounts or for longer than recommended. Measure liquid medicine with the dosing syringe provided, or with a special dose-measuring spoon or medicine cup. If you do not have a dose-measuring device, ask your pharmacist for one. It may take up to 4 weeks before your symptoms improve. Keep using the medication as directed and tell your doctor if your symptoms do not improve. Do not stop using citalopram suddenly, or you could have unpleasant withdrawal symptoms. Ask your doctor how to safely stop using this medicine. Store at room temperature away from moisture and heat. What happens if I miss a dose? Take the missed dose as soon as you remember. Skip the missed dose if it is almost time for your next scheduled dose. Do not take extra medicine to make up the missed dose. What happens if I overdose? Seek emergency medical attention or call the Poison Help line at 1-689.194.6589.   What should I avoid while taking citalopram?  Ask your doctor before taking a nonsteroidal anti-inflammatory drug (NSAID) for pain, arthritis, fever, or swelling. This includes aspirin, ibuprofen (Advil, Motrin), naproxen (Aleve), celecoxib (Celebrex), diclofenac, indomethacin, meloxicam, and others. Using an NSAID with citalopram may cause you to bruise or bleed easily. Drinking alcohol can increase certain side effects of citalopram.  Citalopram may impair your thinking or reactions. Be careful if you drive or do anything that requires you to be alert. What are the possible side effects of citalopram?  Get emergency medical help if you have signs of an allergic reaction: skin rash or hives; difficulty breathing; swelling of your face, lips, tongue, or throat. Report any new or worsening symptoms to your doctor, such as: mood or behavior changes, anxiety, panic attacks, trouble sleeping, or if you feel impulsive, irritable, agitated, hostile, aggressive, restless, hyperactive (mentally or physically), more depressed, or have thoughts about suicide or hurting yourself. Call your doctor at once if you have:  · a light-headed feeling, like you might pass out;  · blurred vision, tunnel vision, eye pain or swelling, or seeing halos around lights;  · headache with chest pain and severe dizziness, fainting, fast or pounding heartbeats;  · severe nervous system reaction --very stiff (rigid) muscles, high fever, sweating, confusion, fast or uneven heartbeats, tremors, feeling like you might pass out;  · high levels of serotonin in the body --agitation, hallucinations, fever, fast heart rate, overactive reflexes, nausea, vomiting, diarrhea, loss of coordination, fainting; or  · low levels of sodium in the body --headache, confusion, slurred speech, severe weakness, vomiting, feeling unsteady.   Common side effects may include:  · problems with memory or concentration;  · headache, drowsiness;  · dry mouth, increased sweating;  · numbness or tingling;  · herein may be time sensitive. [employer] information has been compiled for use by healthcare practitioners and consumers in the [place] and therefore [employer] does not warrant that uses outside of the [place] are appropriate, unless specifically indicated otherwise. [employer] drug information does not endorse drugs, diagnose patients or recommend therapy. [employer] drug information is an informational resource designed to assist licensed healthcare practitioners in caring for their patients and/or to serve consumers viewing this service as a supplement to, and not a substitute for, the expertise, skill, knowledge and judgment of healthcare practitioners. The absence of a warning for a given drug or drug combination in no way should be construed to indicate that the drug or drug combination is safe, effective or appropriate for any given patient. [facility] does not assume any responsibility for any aspect of healthcare administered with the aid of information [facility] provides. The information contained herein is not intended to cover all possible uses, directions, precautions, warnings, drug interactions, allergic reactions, or adverse effects. If you have questions about the drugs you are taking, check with your doctor, nurse or pharmacist.  Copyright 8480-1958 [address]: 20.01. Revision date: 1/6/2017. Care instructions adapted under license by [facility] ([facility]). If you have questions about a medical condition or this instruction, always ask your healthcare professional. [place] [address] any warranty or liability for your use of this information. Patient Education        Meniscus Tear: Exercises  Introduction  Here are some examples of exercises for you to try. The exercises may be suggested for a condition or for rehabilitation. Start each exercise slowly. Ease off the exercises if you start to have pain.   You will be told when to start these exercises and which ones will work best for you. How to do the exercises  Calf wall stretch   1. Stand facing a wall with your hands on the wall at about eye level. Put your affected leg about a step behind your other leg. 2. Keeping your back leg straight and your back heel on the floor, bend your front knee and gently bring your hip and chest toward the wall until you feel a stretch in the calf of your back leg. 3. Hold the stretch for at least 15 to 30 seconds. 4. Repeat 2 to 4 times. 5. Repeat steps 1 through 4, but this time keep your back knee bent. Hamstring wall stretch   1. Lie on your back in a doorway, with your good leg through the open door. 2. Slide your affected leg up the wall to straighten your knee. You should feel a gentle stretch down the back of your leg. 3. Hold the stretch for at least 1 minute. Then over time, try to lengthen the time you hold the stretch to as long as 6 minutes. 4. Repeat 2 to 4 times. 5. If you do not have a place to do this exercise in a doorway, there is another way to do it:  6. Lie on your back, and bend your affected leg. 7. Loop a towel under the ball and toes of that foot, and hold the ends of the towel in your hands. 8. Straighten your knee, and slowly pull back on the towel. You should feel a gentle stretch down the back of your leg. 9. Hold the stretch for at least 15 to 30 seconds. Or even better, hold the stretch for 1 minute if you can. 10. Repeat 2 to 4 times. 1. Do not arch your back. 2. Do not bend either knee. 3. Keep one heel touching the floor and the other heel touching the wall. Do not point your toes. Quad sets   1. Sit with your leg straight and supported on the floor or a firm bed. (If you feel discomfort in the front or back of your knee, place a small towel roll under your knee.)  2. Tighten the muscles on top of your thigh by pressing the back of your knee flat down to the floor.  (If you feel discomfort under your kneecap, place a small towel roll under your knee.)  3. Hold for about 6 seconds, then rest up to 10 seconds. 4. Do 8 to 12 repetitions several times a day. Straight-leg raises to the front   1. Lie on your back with your good knee bent so that your foot rests flat on the floor. Your injured leg should be straight. Make sure that your low back has a normal curve. You should be able to slip your flat hand in between the floor and the small of your back, with your palm touching the floor and your back touching the back of your hand. 2. Tighten the thigh muscles in the injured leg by pressing the back of your knee flat down to the floor. Hold your knee straight. 3. Keeping the thigh muscles tight, lift your injured leg up so that your heel is about 12 inches off the floor. Hold for 5 seconds and then lower slowly. 4. Do 8 to 12 repetitions. Straight-leg raises to the back   1. Lie on your stomach, and lift your leg straight up behind you (toward the ceiling). 2. Lift your toes about 6 inches off the floor, hold for about 6 seconds, then lower slowly. 3. Do 8 to 12 repetitions. Hamstring curls   1. Lie on your stomach with your knees straight. If your kneecap is uncomfortable, roll up a washcloth and put it under your leg just above your kneecap. 2. Lift the foot of your injured leg by bending the knee so that you bring the foot up toward your buttock. If this motion hurts, try it without bending your knee quite as far. This may help you avoid any painful motion. 3. Slowly lower your leg back to the floor. 4. Do 8 to 12 repetitions. 5. With permission from your doctor or physical therapist, you may also want to add a cuff weight to your ankle (not more than 5 pounds). With weight, you do not have to lift your leg more than 12 inches to get a hamstring workout. Wall slide with ball squeeze   1. Stand with your back against a wall and with your feet about shoulder-width apart. Your feet should be about 12 inches away from the wall.   2. Put a ball about the size of a soccer ball between your knees. Then slowly slide down the wall until your knees are bent about 20 to 30 degrees. 3. Tighten your thigh muscles by squeezing the ball between your knees. Hold that position for about 10 seconds, then stop squeezing. Rest for up to 10 seconds between repetitions. 4. Repeat 8 to 12 times. Heel raises   1. Stand with your feet a few inches apart, with your hands lightly resting on a counter or chair in front of you. 2. Slowly raise your heels off the floor while keeping your knees straight. 3. Hold for about 6 seconds, then slowly lower your heels to the floor. 4. Do 8 to 12 repetitions several times during the day. Heel dig bridging   Stop doing this exercise if it causes pain. 1. Lie on your back with both knees bent and your ankles bent so that only your heels are digging into the floor. Your knees should be bent about 90 degrees. 2. Then push your heels into the floor, squeeze your buttocks, and lift your hips off the floor until your shoulders, hips, and knees are all in a straight line. 3. Hold for about 6 seconds as you continue to breathe normally, and then slowly lower your hips back down to the floor and rest for up to 10 seconds. 4. Do 8 to 12 repetitions. Shallow standing knee bends   Do this exercise only if you have very little pain; if you have no clicking, locking, or giving way in the injured knee; and if it does not hurt while you are doing 8 to 12 repetitions. 1. Stand with your hands lightly resting on a counter or chair in front of you. Put your feet shoulder-width apart. 2. Slowly bend your knees so that you squat down like you are going to sit in a chair. Make sure your knees do not go in front of your toes. 3. Lower yourself about 6 inches. Your heels should remain on the floor at all times. 4. Rise slowly to a standing position. Follow-up care is a key part of your treatment and safety.  Be sure to make and go to all appointments, and call your doctor if you are having problems. It's also a good idea to know your test results and keep a list of the medicines you take. Where can you learn more? Go to [email or web address] and sign in to your [facility] account. Enter C183 in the [facility] box to learn more about \"Meniscus Tear: Exercises. \"     If you do not have an account, please click on the \"Sign Up Now\" link. Current as of: November 16, 2020               Content Version: 12.9  © 2006-2021 [employer]. Care instructions adapted under license by [facility] ([facility]). If you have questions about a medical condition or this instruction, always ask your healthcare professional. [address] any warranty or liability for your use of this information. Patient Education        Lateral Collateral Ligament Sprain: Rehab Exercises  Introduction  Here are some examples of exercises for you to try. The exercises may be suggested for a condition or for rehabilitation. Start each exercise slowly. Ease off the exercises if you start to have pain. You will be told when to start these exercises and which ones will work best for you. How to do the exercises  Knee flexion with heel slide   6. Lie on your back with your knees bent. 7. Slide your heel back by bending your affected knee as far as you can. Then hook your other foot around your ankle to help pull your heel even farther back. 8. Hold for about 6 seconds, then rest for up to 10 seconds. 9. Repeat 8 to 12 times. Heel slides on a wall   11. Lie on the floor close enough to a wall so that you can place both legs up on the wall. Your hips should be as close to the wall as is comfortable for you. 12. Start with both feet resting on the wall. Slowly let the foot of your affected leg slide down the wall until you feel a stretch in your knee. 13. Hold for 15 to 30 seconds.   14. Then slowly slide your foot up to where you started. 15. Repeat 2 to 4 times. Quad sets   4. Sit with your affected leg straight and supported on the floor or a firm bed. Place a small, rolled-up towel under your knee. Your other leg should be bent, with that foot flat on the floor. 5. Tighten the thigh muscles of your affected leg by pressing the back of your knee down into the towel. 6. Hold for about 6 seconds, then rest for up to 10 seconds. 7. Repeat 8 to 12 times. Short-arc quad   5. Lie on your back with your knees bent over a foam roll or a large rolled-up towel. 6. Lift the lower part of your affected leg and straighten your knee by tightening your thigh muscle. Keep the bottom of your knee on the foam roll or rolled-up towel. 7. Hold your knee straight for about 6 seconds, then slowly bend your knee and lower your leg back to the floor. Rest for up to 10 seconds between repetitions. 8. Repeat 8 to 12 times. Straight-leg raises to the front   5. Lie on your back with your good knee bent so that your foot rests flat on the floor. Your affected leg should be straight. Make sure that your low back has a normal curve. You should be able to slip your hand in between the floor and the small of your back, with your palm touching the floor and your back touching the back of your hand. 6. Tighten the thigh muscles in your affected leg by pressing the back of your knee flat down to the floor. Hold your knee straight. 7. Keeping the thigh muscles tight and your leg straight, lift your affected leg up so that your heel is about 12 inches off the floor. Hold for about 6 seconds, then lower slowly. 8. Relax for up to 10 seconds between repetitions. 9. Repeat 8 to 12 times. Hamstring set (heel dig)   4. Sit with your affected leg bent. Your good leg should be straight and supported on the floor. 5. Tighten the muscles on the back of your bent leg (hamstring) by pressing your heel into the floor.   6. Hold for about 6 seconds,

## 2021-06-21 DIAGNOSIS — E55.9 VITAMIN D DEFICIENCY: ICD-10-CM

## 2021-06-21 RX ORDER — ERGOCALCIFEROL 1.25 MG/1
50000 CAPSULE ORAL WEEKLY
Qty: 4 CAPSULE | Refills: 5 | Status: SHIPPED | OUTPATIENT
Start: 2021-06-21 | End: 2022-06-02 | Stop reason: ALTCHOICE

## 2021-06-28 ENCOUNTER — HOSPITAL ENCOUNTER (OUTPATIENT)
Facility: CLINIC | Age: 31
Discharge: HOME | End: 2021-06-30
Payer: MEDICARE

## 2021-06-28 ENCOUNTER — HOSPITAL ENCOUNTER (OUTPATIENT)
Dept: GENERAL RADIOLOGY | Facility: CLINIC | Age: 31
Discharge: HOME | End: 2021-06-30
Payer: MEDICARE

## 2021-06-28 DIAGNOSIS — M25.561 ACUTE PAIN OF RIGHT KNEE: ICD-10-CM

## 2021-06-28 PROCEDURE — 73562 X-RAY EXAM OF KNEE 3: CPT

## 2021-06-30 ENCOUNTER — OFFICE VISIT (OUTPATIENT)
Dept: ORTHOPEDIC SURGERY | Age: 31
End: 2021-06-30
Payer: MEDICARE

## 2021-06-30 DIAGNOSIS — M25.561 ACUTE PAIN OF RIGHT KNEE: Primary | ICD-10-CM

## 2021-06-30 DIAGNOSIS — M25.562 ACUTE PAIN OF LEFT KNEE: ICD-10-CM

## 2021-06-30 PROCEDURE — 99204 OFFICE O/P NEW MOD 45 MIN: CPT | Performed by: ORTHOPAEDIC SURGERY

## 2021-06-30 PROCEDURE — 1036F TOBACCO NON-USER: CPT | Performed by: ORTHOPAEDIC SURGERY

## 2021-06-30 PROCEDURE — G8428 CUR MEDS NOT DOCUMENT: HCPCS | Performed by: ORTHOPAEDIC SURGERY

## 2021-06-30 PROCEDURE — G8417 CALC BMI ABV UP PARAM F/U: HCPCS | Performed by: ORTHOPAEDIC SURGERY

## 2021-06-30 NOTE — PROGRESS NOTES
Matthew Sharp M.D.            [address] [facility] [address] [facility],[address], [address] [facility]           Dept Phone: [phone]           Dept Fax:  [address]           David Gonzalez          Dept Phone: [phone]           Dept Fax:  [phone]      Chief Compliant:  Chief Complaint   Patient presents with    Pain     Rt knee        History of Present Illness: This is a 35 y.o. male who presents to the clinic today for evaluation / follow up of right and left knee pain. Patient is a 32-year-old gentleman who states he has had knee pain intermittently for some time. He relates to injuries in the past mostly on his right side when he was playing high school football he also has some pseudoinjuries of the left side however the right side is more symptomatic the patient describes intermittent recurrent regiving way sensation when he Marlon turns. He also relates arising out of chairs difficulty. He denies any swelling. His left side he states is identical although not as severe. Review of Systems   Constitutional: Negative for fever, chills, sweats. Eyes: Negative for changes in vision, or pain. HENT: Negative for ear ache, epistaxis, or sore throat. Respiratory/Cardio: Negative for Chest pain, palpitations, SOB, or cough. Gastrointestinal: Negative for abdominal pain, N/V/D. Genitourinary: Negative for dysuria, frequency, urgency, or hematuria. Neurological: Negative for headache, numbness, or weakness. Integumentary: Negative for rash, itching, laceration, or abrasion. Musculoskeletal: Positive for Pain (Rt knee)       Physical Exam:  Constitutional: Patient is oriented to person, place, and time. Patient appears well-developed and well nourished.    HENT: Negative otherwise noted  Head: Normocephalic and Atraumatic  Nose: Normal  Eyes: Conjunctivae and EOM are normal  Neck: Normal range of motion Neck supple. Respiratory/Cardio: Effort normal. No respiratory distress. Musculoskeletal: Semination the patient's right knee notes normal alignment. His motion is 0-135degrees he has no obvious effusion. He has good patellar tracking a little bit of slight patellofemoral apprehension but certainly nothing too severe. He does have medial joint line tenderness and a intermittently positive Lito's. I am able to appreciate a fairly decent endpoint on Lachman's although he does translate a little bit negative drawer. Posterior drawer is okay collaterals are good at 0 6090 degrees with no rotatory findings    Examination the patient's left knee is identical to above normal endpoint on Lachman's mildly positive Lito's medially this is reproducible collaterals posteriorly are fine cruciates okay no patellofemoral symptoms  Neurological: Patient is alert and oriented to person, place, and time. Normal strenght. No sensory deficit. Skin: Skin is warm and dry  Psychiatric: Behavior is normal. Thought content normal.  Nursing note and vitals reviewed. Labs and Imaging:   No x-rays taken today given patient's age 35       Orders Placed This Encounter   Procedures    MRI KNEE RIGHT WO CONTRAST     Standing Status:   Future     Standing Expiration Date:   6/30/2022    MRI KNEE LEFT WO CONTRAST     Standing Status:   Future     Standing Expiration Date:   6/30/2022       Assessment and Plan:  1. Acute pain of right knee    2. Acute pain of left knee            This is a 35 y.o. male who presents to the clinic today for evaluation / follow up of rule out possible medial meniscus tear right and left knees.      Past History:    Current Outpatient Medications:     vitamin D (ERGOCALCIFEROL) 1.25 MG (19654 UT) CAPS capsule, Take 1 capsule by mouth once a week, Disp: 4 capsule, Rfl: 5    citalopram (CELEXA) 10 MG tablet, Take 1 tablet by mouth daily, Disp: 30 tablet, Rfl: 3    amphetamine-dextroamphetamine (ADDERALL XR) 20 MG extended release capsule, Take 1 capsule by mouth every morning for 30 days. , Disp: 30 capsule, Rfl: 0  No Known Allergies  Social History     Socioeconomic History    Marital status: Single     Spouse name: Not on file    Number of children: Not on file    Years of education: Not on file    Highest education level: Not on file   Occupational History    Not on file   Tobacco Use    Smoking status: Never Smoker    Smokeless tobacco: Never Used   Substance and Sexual Activity    Alcohol use: Yes     Alcohol/week: 0.0 standard drinks     Comment: socially    Drug use: Not on file    Sexual activity: Not on file   Other Topics Concern    Not on file   Social History Narrative    Not on file     Social Determinants of Health     Financial Resource Strain: Low Risk     Difficulty of Paying Living Expenses: Not hard at all   Food Insecurity: No Food Insecurity    Worried About Running Out of Food in the Last Year: Never true    Sadie of Food in the Last Year: Never true   Transportation Needs: No Transportation Needs    Lack of Transportation (Medical): No    Lack of Transportation (Non-Medical): No   Physical Activity:     Days of Exercise per Week:     Minutes of Exercise per Session:    Stress:     Feeling of Stress :    Social Connections:     Frequency of Communication with Friends and Family:     Frequency of Social Gatherings with Friends and Family:     Attends Buddhism Services:     Active Member of Clubs or Organizations:     Attends Club or Organization Meetings:     Marital Status:    Intimate Partner Violence:     Fear of Current or Ex-Partner:     Emotionally Abused:     Physically Abused:     Sexually Abused:      No past medical history on file. Past Surgical History:   Procedure Laterality Date    WRIST FRACTURE SURGERY       No family history on file.    Plan  Patient be scheduled to have MRIs to both his right and left knees. We will see him once these are available to discuss possible intervention    Provider Attestation:  Robinson Georges, personally performed the services described in this documentation. All medical record entries made by the scribe were at my direction and in my presence. I have reviewed the chart and discharge instructions and agree that the records reflect my personal performance and is accurate and complete. Pancho Salinas MD. 06/30/21      Please note that this chart was generated using voice recognition Dragon dictation software. Although every effort was made to ensure the accuracy of this automated transcription, some errors in transcription may have occurred.

## 2021-07-06 DIAGNOSIS — F90.0 ATTENTION DEFICIT HYPERACTIVITY DISORDER (ADHD), PREDOMINANTLY INATTENTIVE TYPE: ICD-10-CM

## 2021-07-07 ENCOUNTER — TELEPHONE (OUTPATIENT)
Dept: INTERNAL MEDICINE CLINIC | Age: 31
End: 2021-07-07

## 2021-07-07 RX ORDER — DEXTROAMPHETAMINE SACCHARATE, AMPHETAMINE ASPARTATE MONOHYDRATE, DEXTROAMPHETAMINE SULFATE AND AMPHETAMINE SULFATE 5; 5; 5; 5 MG/1; MG/1; MG/1; MG/1
20 CAPSULE, EXTENDED RELEASE ORAL EVERY MORNING
Qty: 30 CAPSULE | Refills: 0 | Status: SHIPPED | OUTPATIENT
Start: 2021-07-07 | End: 2021-08-05 | Stop reason: SDUPTHER

## 2021-07-07 NOTE — TELEPHONE ENCOUNTER
----- Message from Jose Alberto Taylor sent at 7/7/2021 11:10 AM EDT -----  Subject: Appointment Request    Reason for Call: Routine Existing Condition Follow Up    QUESTIONS  Type of Appointment? Established Patient  Reason for appointment request? Available appointments did not meet   patient need  Additional Information for Provider? Pt.'s appt was cancelled with PCP but   pt. needs a 6 week f/u regarding depression & anxiety. Pt. is requesting a   new appt with PCP or other staff before the 8/10 availability if possible   to discuss his medications. Pt. would like a follow up call.   ---------------------------------------------------------------------------  --------------  CALL BACK INFO  What is the best way for the office to contact you? OK to leave message on   voicemail  Preferred Call Back Phone Number? [phone]  ---------------------------------------------------------------------------  --------------  SCRIPT ANSWERS  Relationship to Patient? Self  Appointment reason? Well Care/Follow Ups  Select a Well Care/Follow Ups appointment reason? Adult Existing Condition   Follow Up [Diabetes, CHF, COPD, Hypertension/Blood Pressure Check]  (Is the patient requesting to be seen urgently for their symptoms?)? No  Is this follow up request related to routine Diabetes Management? No  Are you having any new concerns about your existing condition? No  Have you been diagnosed with, awaiting test results for, or told that you   are suspected of having COVID-19 (Coronavirus)? (If patient has tested   negative or was tested as a requirement for work, school, or travel and   not based on symptoms, answer no)? No  Do you currently have flu-like symptoms including fever or chills, cough,   shortness of breath, difficulty breathing, or new loss of taste or smell? No  Have you had close contact with someone with COVID-19 in the last 14 days?    No  (Service Expert  click yes below to proceed with [employer] As Usual Scheduling)?  Yes

## 2021-07-08 NOTE — TELEPHONE ENCOUNTER
Called and spoke with patient, he is going to be seeing Psychiatry 7/15/21. I advised that they should take over the medications.  Patient is scheduled to see Melissa Rodgers on 7/29 and will keep us updated if he needs the appt or if meds will be taken over by psychiatry

## 2021-07-15 ENCOUNTER — TELEMEDICINE (OUTPATIENT)
Dept: PSYCHIATRY | Age: 31
End: 2021-07-15
Payer: MEDICARE

## 2021-07-15 DIAGNOSIS — F33.9 MAJOR DEPRESSIVE DISORDER, RECURRENT EPISODE WITH ANXIOUS DISTRESS (HCC): Primary | ICD-10-CM

## 2021-07-15 DIAGNOSIS — F10.10 ALCOHOL CONSUMPTION BINGE DRINKING: ICD-10-CM

## 2021-07-15 DIAGNOSIS — F12.180 CANNABIS ABUSE WITH CANNABIS-INDUCED ANXIETY DISORDER (HCC): ICD-10-CM

## 2021-07-15 PROCEDURE — 90792 PSYCH DIAG EVAL W/MED SRVCS: CPT | Performed by: NURSE PRACTITIONER

## 2021-07-15 RX ORDER — HYDROXYZINE HYDROCHLORIDE 25 MG/1
25 TABLET, FILM COATED ORAL 3 TIMES DAILY PRN
Qty: 90 TABLET | Refills: 0 | Status: SHIPPED | OUTPATIENT
Start: 2021-07-15 | End: 2022-03-09

## 2021-07-15 NOTE — PROGRESS NOTES
months, using around 1 gummie before work and smoking work. Past Psychiatric history:   The patient has a history of  depression, anxiety disorder and substance abuse. Current treatment includes Anti-depressant: celexa. Patient denies side effects from current treatment. Previous treatment has included: Celexa- just started taking it in 06/21, stimulant- adderall- feels it works as needed and individual therapy- did this in the past, but stopped when it got uncomfortable. .    Family Mental Health history:   Pertinent family history: schizophrenia, alcoholism and drug abuse. MSE:    Appearance: alert, cooperative  Attention:Intact  Appetite: abnormal: eating minimally  Ambulation: unable to assess. Sleep disturbance: Yes  Loss of pleasure: Yes  Speech: spontaneous, normal rate, normal volume and well articulated  Mood: Depressed  Affect: depressed affect  Thought Content: intact, hopelessness, worthlessness and excessive guilt  Insight: Poor  Judgment: Intact  Memory: Intact long-term and Intact short-term  Suicide Assessment: no suicidal ideation  Homicide Assessment: denies current homicidal ideation, plan and intent      History:      Review of Systems:   Constitutional: negative  HENT: negative  Eyes: negative  Respiratory: negative  Cardiovascular: negative  Gastrointestinal: negative  Genitourinary: negative  Musculoskeletal: negative  Skin:negative  Neurological:negative  Endo/Heme/Allergies:negative        Current Outpatient Medications:     hydrOXYzine (ATARAX) 25 MG tablet, Take 1 tablet by mouth 3 times daily as needed for Anxiety, Disp: 90 tablet, Rfl: 0    amphetamine-dextroamphetamine (ADDERALL XR) 20 MG extended release capsule, Take 1 capsule by mouth every morning for 30 days. , Disp: 30 capsule, Rfl: 0    vitamin D (ERGOCALCIFEROL) 1.25 MG (07325 UT) CAPS capsule, Take 1 capsule by mouth once a week, Disp: 4 capsule, Rfl: 5    citalopram (CELEXA) 10 MG tablet, Take 1 tablet by mouth daily, Disp: 30 tablet, Rfl: 3     PDMP Monitoring:    Last PDMP Jay as Reviewed [facility]):  Review User Review Instant Review Result   BEHNFELDT, PHILIP 7/15/2021  9:02 AM Reviewed PDMP [1]       Urine Drug Screenings (1 yr)     POCT Rapid Drug Screen  Collected: 5/26/2016 (Final result)    Complete Results          POCT Rapid Drug Screen  Collected: 2/9/2016 (Final result)    Complete Results          Urine Drug Screen  Collected: 5/18/2021 12:20 PM (Final result)    Complete Results          Drug Screen, Pain  Collected: 10/15/2019 10:07 PM (Final result)    Complete Results          Urine Drug Screen 9 Panel  Collected: 12/5/2019  4:51 PM (Final result)    Narrative: Ordering Provider: Kurt Fleming    Complete Results          Amphetamine, Urine, Confirmation, Quant  Collected: 12/5/2019  4:51 PM (Final result)    Narrative: Ordering Provider: Kurt Fleming    Complete Results              Medication Contract and Consent for Opioid Use Documents Filed     Patient Documents       Type of Document Status Date Received Received By Description     Medication Contract Signed 6/19/2019  2:50 PM [address] checked and there were no concerns of substance abuse, or prescription misuse. Social History     Socioeconomic History    Marital status: Single     Spouse name: Not on file    Number of children: Not on file    Years of education: Not on file    Highest education level: Not on file   Occupational History    Not on file   Tobacco Use    Smoking status: Never Smoker    Smokeless tobacco: Never Used   Substance and Sexual Activity    Alcohol use:  Yes     Alcohol/week: 0.0 standard drinks     Comment: socially    Drug use: Not on file    Sexual activity: Not on file   Other Topics Concern    Not on file   Social History Narrative    Not on file     Social Determinants of Health     Financial Resource Strain: Low Risk     Difficulty of Paying Living Expenses: Not hard at all   Food Insecurity: No Food Insecurity    Worried About [address] in the Last Year: Never true    Sadie of Food in the Last Year: Never true   Transportation Needs: No Transportation Needs    Lack of Transportation (Medical): No    Lack of Transportation (Non-Medical): No   Physical Activity:     Days of Exercise per Week:     Minutes of Exercise per Session:    Stress:     Feeling of Stress :    Social Connections:     Frequency of Communication with Friends and Family:     Frequency of Social Gatherings with Friends and Family:     Attends Amish Services:     Active Member of Clubs or Organizations:     Attends Club or Organization Meetings:     Marital Status:    Intimate Partner Violence:     Fear of Current or Ex-Partner:     Emotionally Abused:     Physically Abused:     Sexually Abused:        TOBACCO: Taurus  reports that he has never smoked. He has never used smokeless tobacco.  ETOH: Taurus  reports current alcohol use. No past medical history on file. Metabolic monitoring is being done by PCP. No family history on file. Last Labs: No results found for: LABA1C  No results found for: EAG   Lab Results   Component Value Date    WBC 6.5 05/18/2021    HGB 14.2 05/18/2021    HCT 47.7 05/18/2021    MCV 76.7 (L) 05/18/2021     05/18/2021    LYMPHOPCT 22 (L) 05/18/2021    RBC 6.22 (H) 05/18/2021    MCH 22.8 (L) 05/18/2021    MCHC 29.8 05/18/2021    RDW 16.1 (H) 05/18/2021          Lab Results   Component Value Date     05/18/2021    K 4.7 05/18/2021     05/18/2021    CO2 24 05/18/2021    BUN 8 05/18/2021    CREATININE 0.80 05/18/2021    GLUCOSE 88 05/18/2021    CALCIUM 9.4 05/18/2021    PROT 7.6 05/18/2021    LABALBU 4.5 05/18/2021    BILITOT 0.63 05/18/2021    ALKPHOS 42 05/18/2021    AST 23 05/18/2021    ALT 22 05/18/2021    LABGLOM >60 05/18/2021    GFRAA >60 05/18/2021      . last      Diagnosis:      1.  Major depressive disorder, recurrent episode with anxious distress ([facility] [address]    2. Cannabis abuse with cannabis-induced anxiety disorder ([facility]    3. Alcohol consumption binge drinking        Plan:    Discussed adding hydroxyzine for anxiety as needed, will continue the celexa. Discussed risks and benefits of medications, as well as need for yearly lab work. Follow up with [facility] for continued therapy. No orders of the defined types were placed in this encounter.      Orders Placed This Encounter   Medications    hydrOXYzine (ATARAX) 25 MG tablet     Sig: Take 1 tablet by mouth 3 times daily as needed for Anxiety     Dispense:  90 tablet     Refill:  0       Pt interventions:    Discussed importance of medication adherence, Discussed risks, benefits, side effects of medication and need for follow up treatment, Discussed benefits of referral for specialty care and Discussed self-care (sleep, nutrition, rewarding activities, social support, exercise)

## 2021-07-21 ENCOUNTER — OFFICE VISIT (OUTPATIENT)
Dept: PSYCHOLOGY | Age: 31
End: 2021-07-21
Payer: MEDICARE

## 2021-07-21 DIAGNOSIS — F33.9 MAJOR DEPRESSIVE DISORDER, RECURRENT EPISODE WITH ANXIOUS DISTRESS (HCC): Primary | ICD-10-CM

## 2021-07-21 PROCEDURE — 1036F TOBACCO NON-USER: CPT | Performed by: PSYCHOLOGIST

## 2021-07-21 PROCEDURE — 90791 PSYCH DIAGNOSTIC EVALUATION: CPT | Performed by: PSYCHOLOGIST

## 2021-07-21 NOTE — PROGRESS NOTES
depressive episode. Pt reported a history of depressive episodes since approximately 2016. Around this time, pt reported a difficult separation from a long-term girlfriend, which he attributed to the onset of mental health symptoms. When discussing the nature of this relationship, pt reported that the relationship should've ended before this. Pt further elaborated the he cheated on her and was emotionally unavailable to her. He also reported that he was \". . . never on her side\" which contributed to significant relational conflict. Pt reported that when his current symptoms of depression are about the same as when they emerged in 2016. Pt further reported that he is in treatment via his PCP. When prompted about what his non-depressed state looks like to him, pt reported feeling up, happy, carefree, and enjoying what life has to offer. Pt denied any symptoms consistent with a manic episode (e.g., excessive talkativeness, decreased need for sleep, risky behavior). MENTAL STATUS EXAM  Mood was within normal limits with calm affect. Suicidal ideation was reported, but passive (see below). Homicidal ideation was denied. Hygiene was good . Dress was appropriate. Behavior was Within Normal Limits with No observation or self-report of difficulties ambulating. Attitude was Cooperative. Eye-contact was good. Speech: rate - WNL, rhythm -  WNL, volume - WNL  Verbalizations were goal directed. Thought processes were intact and goal-oriented without evidence of delusions, hallucinations, obsessions, or lucia; with no cognitive distortions. Associations were characterized by intact cognitive processes. Pt was oriented to person, place, time, and general circumstances;  recent:  good and remote:  good. Insight and judgment were estimated to be good, AEB, a good  understanding of cyclical maladaptive patterns, and the ability to use insight to inform behavior change.        CURRENT MEDICATIONS    Current Outpatient Medications:     hydrOXYzine (ATARAX) 25 MG tablet, Take 1 tablet by mouth 3 times daily as needed for Anxiety, Disp: 90 tablet, Rfl: 0    amphetamine-dextroamphetamine (ADDERALL XR) 20 MG extended release capsule, Take 1 capsule by mouth every morning for 30 days. , Disp: 30 capsule, Rfl: 0    vitamin D (ERGOCALCIFEROL) 1.25 MG (64598 UT) CAPS capsule, Take 1 capsule by mouth once a week, Disp: 4 capsule, Rfl: 5    citalopram (CELEXA) 10 MG tablet, Take 1 tablet by mouth daily, Disp: 30 tablet, Rfl: 3     FAMILY MEDICAL/MH HISTORY   His family history is not on file. Regarding family mental health history, pt reported an uncle has schizophrenia. Pt also reported his Mom's brother committed suicide and that both sides, Mom and Dad, have history of substance use/misuse. Pt reported that grandma on his mom's side had difficulties with alcohol use, and mom's eldest sister was addicted to some other substance (I.e., not alcohol). PATIENT MENTAL HEALTH HISTORY  Prior to attending talk therapy today, pt reported one prior incident of talk therapy approximately 2 years ago (July 2018). Pt reported he attended approximately 3-4 sessions with Dr. Melanie Tena, a licensed psychologist residing in private practice in [address]. In session, pt reported that he enjoyed working on the development of coping skills and hopes to build on these skills in future sessions. However, pt also reported he was discouraged by Dr. Moreno Congo interpersonal style, specifically discussions around suicide, and in result, he terminated therapy at this location. Broadly, pt reported not wanting to take medications despite a current medication history. Regarding medication history for mental health symptoms, pt currently reported being on Celexa, 10 mg, 1 tablet taken daily as prescribed by XIAO Bronw. Pt reported he has been taking this for approximately 1 month and is taking it as prescribed.  Pt reported no negative side effects. Pt also reported taking Adderall XR, as prescribed by the same CNP above; but later reported does not take on the weekends. Pt reported no negative side effects. According to pt's record, pt started with [facility] psychiatry on 7/30/2021, with EUSEBIO Springer NP, and was prescribed hydrOXYzine for anxiety; however, pt did not report taking this during the interview. Pt reported that he stopped taking this medication because he \"did not like the way it made him feel. \"    Regarding current and past homicidal intent/ideation, pt denied both. Regarding past suicidal intent/ideation, pt reported developing one plan in 2018 to commit suicide. Pt did not attempt this suicide plan, which consisted of drinking Draino. Currently, pt reported passive suicidal ideation that is easily dismissed. When prompted further about what helps mitigate passive suicidal ideation, pt reported he 1) can easily talk himself out of it; 2) staying hydrated; 3) he thinks about hurting the people he loves; and 4) he would not want to hurt his roommates by having them find him. Finally, pt denied any past history of psychiatric hospitalization. PSYCHOSOCIAL HISTORY  Current living situation: Currently, pt lives with two roommates in the [facility] area. Pt reported this living experience as an overall positive living experience. Work/Education: Pt reported that he works as a  for a business. Pt reported that he is one class away from completing his associates degree in an arts and sciences field. Pt reported earning a high school diploma. Support system: Pt reported his primary social supports as friends, including roommates and classmates. Pt reported that his is in touch with his family but they do not have a close, emotional relationship. Zoroastrianism/Spirituality: Pt reported that while he grew up [place], Bahai and spirituality does not play much of role in his life currently.     DRUG AND ALCOHOL CURRENT USE/HISTORY  TOBACCO:  He reports that he has never smoked. He has never used smokeless tobacco.  ALCOHOL:  He reports current alcohol use. Please note: In session, we focused on mood symptoms and did not get to assess in detail alcohol use, particularly history of binge drinking. We will further assess for this in subsequent sessions. OTHER SUBSTANCES: He has no history on file for drug use. Please note: In session, we focused on mood symptoms and did not get to assess in detail cannabis use. We will further assess for this in subsequent sessions. ASSESSMENT  Taurus Dudley presented to the appointment today for evaluation and treatment of symptoms of depression. He is currently deemed low risk to himself (Risk Factors: male, history of suicidal thoughts, loss of romantic relationship; Protective Factors: close friendships, educational attainment, positive therapeutic relationships with medical provider, no history of self-harm behavior, no past suicide attempts) and no risk to others and meets criteria for major depressive disorder, recurrent, with anxious distress. [facility] will continue to monitor for any changes to suicidal ideation and risk level. While Taurus's depression symptoms are improved with medication use, there remains significant symptoms. He will also benefit from brief and solution-focused consultation to address cognitive and behavioral interventions for depression symptoms. Taurus was in agreement with recommendations. PHQ Scores 6/16/2021 4/8/2021 12/18/2020 6/5/2019   PHQ2 Score 2 1 4 0   PHQ9 Score 2 1 11 0     Interpretation of Total Score Depression Severity: 1-4 = Minimal depression, 5-9 = Mild depression, 10-14 = Moderate depression, 15-19 = Moderately severe depression, 20-27 = Severe depression    How often pt has had thoughts of death or hurting self (if PHQ positive for depression):       No flowsheet data found.   Interpretation of JESSICA-7 score: 5-9 = mild anxiety, 10-14 = moderate anxiety, 15+ = severe anxiety. Recommend referral to behavioral health for scores 10 or greater. DIAGNOSIS  Major depressive disorder, recurrent episode, with anxious distress      INTERVENTION  Provided education, Discussed self-care (sleep, nutrition, rewarding activities, social support, exercise), Established rapport and Supportive techniques      PLAN  1) Pt will explore what coping skills he has and which he would like to build on in subsequent sessions. 2) [facility] and pt will document a thought record together next session. 3) Pt will return for a follow-up with [facility] in 2-3 weeks. INTERACTIVE COMPLEXITY  Is interactive complexity present?   No  Reason:  N/A  Additional Supporting Information:  N/A       Electronically signed by Reid Ro on 7/21/21 at 1:56 PM EDT

## 2021-07-27 ENCOUNTER — HOSPITAL ENCOUNTER (OUTPATIENT)
Dept: MRI IMAGING | Age: 31
Discharge: HOME | End: 2021-07-29
Payer: MEDICARE

## 2021-07-27 DIAGNOSIS — M25.561 ACUTE PAIN OF RIGHT KNEE: ICD-10-CM

## 2021-07-27 DIAGNOSIS — M25.562 ACUTE PAIN OF LEFT KNEE: ICD-10-CM

## 2021-07-27 PROCEDURE — 73721 MRI JNT OF LWR EXTRE W/O DYE: CPT

## 2021-07-28 ENCOUNTER — TELEPHONE (OUTPATIENT)
Dept: ORTHOPEDIC SURGERY | Age: 31
End: 2021-07-28

## 2021-07-28 RX ORDER — MELOXICAM 15 MG/1
15 TABLET ORAL DAILY
Qty: 30 TABLET | Refills: 3 | Status: SHIPPED | OUTPATIENT
Start: 2021-07-28 | End: 2021-10-08 | Stop reason: SDUPTHER

## 2021-07-28 NOTE — TELEPHONE ENCOUNTER
I spoke with patient on results and answered questions on new medication, patient vouiced understanding    ----- Message from Michele Romero MD sent at 7/28/2021 12:06 PM EDT -----  No tears, some PF arthritis. Start on Mobic.  No follow up necessary

## 2021-07-28 NOTE — TELEPHONE ENCOUNTER
I spoke with patient on results, he voiced understanding.    ----- Message from Alvin Schumacher MD sent at 7/28/2021 12:06 PM EDT -----  Same as left knee, no tears.

## 2021-07-29 ENCOUNTER — OFFICE VISIT (OUTPATIENT)
Dept: INTERNAL MEDICINE CLINIC | Age: 31
End: 2021-07-29
Payer: MEDICARE

## 2021-07-29 VITALS
BODY MASS INDEX: 26.24 KG/M2 | SYSTOLIC BLOOD PRESSURE: 132 MMHG | WEIGHT: 188 LBS | DIASTOLIC BLOOD PRESSURE: 84 MMHG | HEART RATE: 63 BPM | OXYGEN SATURATION: 100 %

## 2021-07-29 DIAGNOSIS — F90.2 ATTENTION DEFICIT HYPERACTIVITY DISORDER (ADHD), COMBINED TYPE: ICD-10-CM

## 2021-07-29 DIAGNOSIS — M25.561 CHRONIC PAIN OF BOTH KNEES: ICD-10-CM

## 2021-07-29 DIAGNOSIS — F41.9 ANXIETY AND DEPRESSION: Primary | ICD-10-CM

## 2021-07-29 DIAGNOSIS — M25.562 CHRONIC PAIN OF BOTH KNEES: ICD-10-CM

## 2021-07-29 DIAGNOSIS — F32.A ANXIETY AND DEPRESSION: Primary | ICD-10-CM

## 2021-07-29 DIAGNOSIS — G89.29 CHRONIC PAIN OF BOTH KNEES: ICD-10-CM

## 2021-07-29 PROCEDURE — 99213 OFFICE O/P EST LOW 20 MIN: CPT | Performed by: NURSE PRACTITIONER

## 2021-07-29 PROCEDURE — 1036F TOBACCO NON-USER: CPT | Performed by: NURSE PRACTITIONER

## 2021-07-29 PROCEDURE — G8427 DOCREV CUR MEDS BY ELIG CLIN: HCPCS | Performed by: NURSE PRACTITIONER

## 2021-07-29 PROCEDURE — G8417 CALC BMI ABV UP PARAM F/U: HCPCS | Performed by: NURSE PRACTITIONER

## 2021-07-29 ASSESSMENT — ENCOUNTER SYMPTOMS
ABDOMINAL PAIN: 0
DIARRHEA: 0
NAUSEA: 0

## 2021-07-29 NOTE — PATIENT INSTRUCTIONS
Patient Education        Attention Deficit Hyperactivity Disorder (ADHD) in Adults: Care Instructions  Your Care Instructions     Attention deficit hyperactivity disorder, or ADHD, is a condition that makes it hard to pay attention. So you may have problems when you try to focus, get organized, and finish tasks. It might make you more active than other people. Or you might do things without thinking first.  ADHD is very common. It usually starts in early childhood. Many adults don't realize they have it until their children are diagnosed. Then they become aware of their own symptoms. Doctors don't know what causes ADHD. But it often runs in families. ADHD can be treated with medicines, behavior training, and counseling. Treatment can improve your life. Follow-up care is a key part of your treatment and safety. Be sure to make and go to all appointments, and call your doctor if you are having problems. It's also a good idea to know your test results and keep a list of the medicines you take. How can you care for yourself at home? · Learn all you can about ADHD. This will help you and your family understand it better. · Take your medicines exactly as prescribed. Call your doctor if you think you are having a problem with your medicine. You will get more details on the specific medicines your doctor prescribes. · If you miss a dose of your medicine, do not take an extra dose. · If your doctor suggests counseling, find a counselor you like and trust. Talk openly and honestly. Be willing to make some changes. · Find a support group for adults with ADHD. Talking to others with the same problems can help you feel better. It can also give you ideas about how to best cope with the condition. · Get rid of distractions at your work space. Keep your desk clean. Try not to face a window or busy hallway. · Use files, planners, and other tools to keep you organized.   · Limit use of alcohol, and do not use illegal drugs. People with ADHD tend to develop substance use disorder more easily than others. Tell your doctor if you need help to quit. Counseling, support groups, and sometimes medicines can help you stay free of alcohol or drugs. · Get at least 30 minutes of physical activity on most days of the week. Exercise has been shown to help people cope with ADHD. Walking is a good choice. You also may want to do other activities, such as running, swimming, cycling, or playing tennis or team sports. When should you call for help? Watch closely for changes in your health, and be sure to contact your doctor if:    · You feel sad a lot or cry all the time.     · You have trouble sleeping, or you sleep too much.     · You find it hard to concentrate, make decisions, or remember things.     · You change how you normally eat.     · You feel guilty for no reason. Where can you learn more? Go to https://[employer] Entertainmentpeparesh.[email or web address]. org and sign in to your [employer] account. Enter B196 in the [employer] box to learn more about \"Attention Deficit Hyperactivity Disorder (ADHD) in Adults: Care Instructions. \"     If you do not have an account, please click on the \"Sign Up Now\" link. Current as of: September 23, 2020               Content Version: 12.9  © 8024-1290 Healthwise, Incorporated. Care instructions adapted under license by [facility] ([facility]). If you have questions about a medical condition or this instruction, always ask your healthcare professional. [place] [address] any warranty or liability for your use of this information.

## 2021-07-29 NOTE — PROGRESS NOTES
Visit Information    Have you changed or started any medications since your last visit including any over-the-counter medicines, vitamins, or herbal medicines? no   Are you having any side effects from any of your medications? -  no  Have you stopped taking any of your medications? Is so, why? -  no    Have you seen any other physician or provider since your last visit? Yes - Records Obtained  Have you had any other diagnostic tests since your last visit? Yes - Records Obtained  Have you been seen in the emergency room and/or had an admission to a hospital since we last saw you? No  Have you had your routine dental cleaning in the past 6 months? no    Have you activated your [employer] account? If not, what are your barriers?  Yes     Patient Care Team:  EUSEBIO Downs CNP as PCP - General (Nurse Practitioner)  EUSEBIO Downs CNP as PCP - [facility]    Medical History Review  Past Medical, Family, and Social History reviewed and does contribute to the patient presenting condition    Health Maintenance   Topic Date Due    Hepatitis C screen  Never done    Pneumococcal 0-64 years Vaccine (1 of 2 - PPSV23) Never done    HIV screen  Never done    DTaP/Tdap/Td vaccine (7 - Td or Tdap) 09/03/2014    Flu vaccine (1) 09/01/2021    Hepatitis B vaccine  Completed    Hib vaccine  Completed    Varicella vaccine  Completed    COVID-19 Vaccine  Completed    Hepatitis A vaccine  Aged Out    Meningococcal (ACWY) vaccine  Aged Out         [address] [address]  Dept: [phone]  Dept Fax: [phone]    Office Progress/Follow Up Note  Date of patient's visit: 7/29/2021   Patient's Name:  Jessy Ennis  YOB: 1990            Patient Care Team:  EUSEBIO Downs CNP as PCP - General (Nurse Practitioner)  EUSEBIO Downs CNP as PCP - [facility]    REASON FOR VISIT: Anxiety, Depression, and Knee Pain (right knee)        HISTORY OF PRESENT ILLNESS:      History was obtained from the patient. Hernando Joseph is a 35 y.o. is here for Anxiety, Depression, and Knee Pain (right knee)    Patient is seen today for follow-up of anxiety and depression, with history of ADHD. He was referred to behavioral health, has been seen and states they mentioned decreasing his Adderall. He is requesting a dose increase of his Adderall today, and I explained to him that this is best managed by his behavioral health provider. He is currently on Celexa and hydroxyzine as needed, and we discussed that as his anxiety improves he may find that his ability to concentrate also improves. Knee pain has been ongoing, he was referred to Ortho, had MRI of both knees completed which was unremarkable and Ortho has signed off. Patient Active Problem List   Diagnosis    High risk medication use    ADD (attention deficit disorder)    Vitamin D deficiency    Chronic fatigue    Mild episode of recurrent major depressive disorder ([facility] [address]    BMI 27.0-27.9,adult    Nephrolithiasis    Epididymitis, left    Anxiety    Moderate recurrent major depression (HCC)    Major depressive disorder, recurrent episode with anxious distress (HCC)    Cannabis abuse with cannabis-induced anxiety disorder ([facility] [address]    Alcohol consumption binge drinking       No Known Allergies      MEDICATIONS:     Current Outpatient Medications   Medication Sig Dispense Refill    meloxicam (MOBIC) 15 MG tablet Take 1 tablet by mouth daily 30 tablet 3    hydrOXYzine (ATARAX) 25 MG tablet Take 1 tablet by mouth 3 times daily as needed for Anxiety 90 tablet 0    amphetamine-dextroamphetamine (ADDERALL XR) 20 MG extended release capsule Take 1 capsule by mouth every morning for 30 days.  30 capsule 0    vitamin D (ERGOCALCIFEROL) 1.25 MG (42371 UT) CAPS capsule Take 1 capsule by mouth once a week 4 capsule 5    citalopram (CELEXA) 20 MG tablet Take 1 tablet by mouth daily 90 tablet 0     No current facility-administered medications for this visit. SOCIAL HISTORY    Reviewed and updated. Taurus  reports that he has never smoked. He has never used smokeless tobacco.    FAMILY HISTORY:    Reviewed and updated. family history is not on file. REVIEW OF SYSTEMS:      Review of Systems   Constitutional:        Healthy diet  Exercise-weights   Cardiovascular: Negative for chest pain, palpitations and leg swelling. Gastrointestinal: Negative for abdominal pain, diarrhea and nausea. Occasional upset stomach since Celexa   Musculoskeletal: Positive for arthralgias. Negative for gait problem. Psychiatric/Behavioral: Positive for decreased concentration and sleep disturbance. The patient is nervous/anxious and is hyperactive. Feels like adderall wears off around noon        PHYSICAL EXAM:      Vitals:    07/29/21 1252   BP: 132/84   Site: Left Upper Arm   Position: Sitting   Pulse: 63   SpO2: 100%   Weight: 188 lb (85.3 kg)     BP Readings from Last 3 Encounters:   07/29/21 132/84   06/16/21 124/72   04/08/21 126/74        Physical Exam  Constitutional:       General: He is not in acute distress. Appearance: Normal appearance. He is well-developed. HENT:      Head: Normocephalic and atraumatic. Right Ear: External ear normal.      Left Ear: External ear normal.   Eyes:      General:         Right eye: No discharge. Left eye: No discharge. Conjunctiva/sclera: Conjunctivae normal.      Pupils: Pupils are equal, round, and reactive to light. Neck:      Thyroid: No thyromegaly. Cardiovascular:      Rate and Rhythm: Normal rate and regular rhythm. Pulses: Normal pulses. Heart sounds: Normal heart sounds. No murmur heard. Pulmonary:      Effort: Pulmonary effort is normal.      Breath sounds: Normal breath sounds. No wheezing. Abdominal:      General: Bowel sounds are normal. There is no distension.       Palpations: 10:55 PM    Please note that this chart was generated using voice recognition Dragon dictation software. Although every effort was made to ensure the accuracy of this automatedtranscription, some errors in transcription may have occurred.

## 2021-07-30 ENCOUNTER — TELEMEDICINE (OUTPATIENT)
Dept: PSYCHIATRY | Age: 31
End: 2021-07-30
Payer: MEDICARE

## 2021-07-30 DIAGNOSIS — F10.10 ALCOHOL CONSUMPTION BINGE DRINKING: ICD-10-CM

## 2021-07-30 DIAGNOSIS — F12.180 CANNABIS ABUSE WITH CANNABIS-INDUCED ANXIETY DISORDER (HCC): ICD-10-CM

## 2021-07-30 DIAGNOSIS — F33.9 MAJOR DEPRESSIVE DISORDER, RECURRENT EPISODE WITH ANXIOUS DISTRESS (HCC): Primary | ICD-10-CM

## 2021-07-30 PROCEDURE — 99214 OFFICE O/P EST MOD 30 MIN: CPT | Performed by: NURSE PRACTITIONER

## 2021-07-30 RX ORDER — CITALOPRAM 20 MG/1
20 TABLET ORAL DAILY
Qty: 90 TABLET | Refills: 0 | Status: SHIPPED | OUTPATIENT
Start: 2021-07-30 | End: 2021-08-27 | Stop reason: SDUPTHER

## 2021-07-30 NOTE — PROGRESS NOTES
Behavioral Health Consultation  Brandy Barnett, MSN, APRN-CNP, PMHNP-BC  7/30/2021, 12:43 PM      Time spent with Patient:  30 minutes  This was a telehealth visit. Patient Location: Home. Provider Location: Home office in [place], [place]  This virtual visit was conducted via interactive/real-time audio/video. Chief Complaint:follow up for depression and anxiety. [place]:  Reason for visit is medication management follow up. He has been compliant with medications. Pt reports that medications have  been working. Emma Low states that he feels the medication is \"wearing off. \" States that he feels the depression is manageable for him. He states that he is feeling like the dose may be correct for him. He states the anxiety is returning aroudn the same time that the adderall wears off. Pt denies side effects from medications. Pt denies hallucinations. Pt reports there has been no changes to appetite. Pt reports sleep has been alright. Pt denies  current exercise. Pt denies current suicidal ideation, plan and intent. Pt  denies current homicidal ideation, plan and [email or web address]). Social:single, no kids. Working as a  for [employer], just started at the beginning of the year. 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 THC-daily for the last four months, using around 1 gummie before work and smoking work. Past Psychiatric history:   The patient has a history of  depression, anxiety disorder and substance abuse. Current treatment includes Anti-depressant: celexa. Patient denies side effects from current treatment. Previous treatment has included: Celexa- just started taking it in 06/21, stimulant- adderall- feels it works as needed and individual therapy- did this in the past, but stopped when it got uncomfortable.  .    Family Mental Health history:   Pertinent family history: schizophrenia, alcoholism and drug abuse. MSE:    Appearance: alert, cooperative  Attention:Intact  Appetite: normal  Ambulation: unable to assess. Sleep disturbance: No  Loss of pleasure: No  Speech: spontaneous, normal rate, normal volume and well articulated  Mood: Depressed  Affect: depressed affect  Thought Content: intact and hopelessness  Insight: Fair  Judgment: Intact  Memory: Intact long-term and Intact short-term  Suicide Assessment: no suicidal ideation  Homicide Assessment: denies current homicidal ideation, plan and intent    History:      Review of Systems:   Constitutional: negative  HENT: negative  Eyes: negative  Respiratory: negative  Cardiovascular: negative  Gastrointestinal: negative  Genitourinary: negative  Musculoskeletal: negative  Skin:negative  Neurological:negative  Endo/Heme/Allergies:negative      Current Outpatient Medications:     citalopram (CELEXA) 20 MG tablet, Take 1 tablet by mouth daily, Disp: 90 tablet, Rfl: 0    meloxicam (MOBIC) 15 MG tablet, Take 1 tablet by mouth daily, Disp: 30 tablet, Rfl: 3    hydrOXYzine (ATARAX) 25 MG tablet, Take 1 tablet by mouth 3 times daily as needed for Anxiety, Disp: 90 tablet, Rfl: 0    amphetamine-dextroamphetamine (ADDERALL XR) 20 MG extended release capsule, Take 1 capsule by mouth every morning for 30 days. , Disp: 30 capsule, Rfl: 0    vitamin D (ERGOCALCIFEROL) 1.25 MG (92693 UT) CAPS capsule, Take 1 capsule by mouth once a week, Disp: 4 capsule, Rfl: 5     PDMP Monitoring:    Last PDMP Jay as Reviewed [facility]):  Review User Review Instant Review Result   Michelle Walker 7/30/2021 12:31 PM Reviewed PDMP [1]     Last Controlled Substance Monitoring Documentation      Telemedicine from 7/15/2021 in [address]   Periodic Controlled Substance Monitoring  No signs of potential drug abuse or diversion identified.  filed at 07/15/2021 0902        Urine Drug Screenings (1 yr)     POCT Rapid Drug Screen  Collected: 5/26/2016 (Final result)    Complete Results          POCT Rapid Drug Screen  Collected: 2/9/2016 (Final result)    Complete Results          Urine Drug Screen  Collected: 5/18/2021 12:20 PM (Final result)    Complete Results          Drug Screen, Pain  Collected: 10/15/2019 10:07 PM (Final result)    Complete Results          Urine Drug Screen 9 Panel  Collected: 12/5/2019  4:51 PM (Final result)    Narrative: Ordering Provider: Shannan Mullins    Complete Results          Amphetamine, Urine, Confirmation, Quant  Collected: 12/5/2019  4:51 PM (Final result)    Narrative: Ordering Provider: Shannan Mullins    Complete Results              Medication Contract and Consent for Opioid Use Documents Filed     Patient Documents       Type of Document Status Date Received Received By Description     Medication Contract Signed 6/19/2019  2:50 PM [address] 16 checked and there were no signs of substance abuse, or prescription misuse. Social History     Socioeconomic History    Marital status: Single     Spouse name: Not on file    Number of children: Not on file    Years of education: Not on file    Highest education level: Not on file   Occupational History    Not on file   Tobacco Use    Smoking status: Never Smoker    Smokeless tobacco: Never Used   Substance and Sexual Activity    Alcohol use: Yes     Alcohol/week: 0.0 standard drinks     Comment: socially    Drug use: Not on file    Sexual activity: Not on file   Other Topics Concern    Not on file   Social History Narrative    Not on file     Social Determinants of Health     Financial Resource Strain: Low Risk     Difficulty of Paying Living Expenses: Not hard at all   Food Insecurity: No Food Insecurity    Worried About Running Out of Food in the Last Year: Never true    Sadie of Food in the Last Year: Never true   Transportation Needs: No Transportation Needs    Lack of Transportation (Medical):  No    Lack of Transportation (Non-Medical): No   Physical Activity:     Days of Exercise per Week:     Minutes of Exercise per Session:    Stress:     Feeling of Stress :    Social Connections:     Frequency of Communication with Friends and Family:     Frequency of Social Gatherings with Friends and Family:     Attends Judaism Services:     Active Member of Clubs or Organizations:     Attends Club or Organization Meetings:     Marital Status:    Intimate Partner Violence:     Fear of Current or Ex-Partner:     Emotionally Abused:     Physically Abused:     Sexually Abused:        TOBACCO: Taurus  reports that he has never smoked. He has never used smokeless tobacco.  ETOH: Taurus  reports current alcohol use. No past medical history on file. Metabolic monitoring is being done by PCP. No family history on file. Last Labs: No results found for: LABA1C  No results found for: EAG   Lab Results   Component Value Date    WBC 6.5 05/18/2021    HGB 14.2 05/18/2021    HCT 47.7 05/18/2021    MCV 76.7 (L) 05/18/2021     05/18/2021    LYMPHOPCT 22 (L) 05/18/2021    RBC 6.22 (H) 05/18/2021    MCH 22.8 (L) 05/18/2021    MCHC 29.8 05/18/2021    RDW 16.1 (H) 05/18/2021          Lab Results   Component Value Date     05/18/2021    K 4.7 05/18/2021     05/18/2021    CO2 24 05/18/2021    BUN 8 05/18/2021    CREATININE 0.80 05/18/2021    GLUCOSE 88 05/18/2021    CALCIUM 9.4 05/18/2021    PROT 7.6 05/18/2021    LABALBU 4.5 05/18/2021    BILITOT 0.63 05/18/2021    ALKPHOS 42 05/18/2021    AST 23 05/18/2021    ALT 22 05/18/2021    LABGLOM >60 05/18/2021    GFRAA >60 05/18/2021      . last    Diagnosis:      1. Major depressive disorder, recurrent episode with anxious distress ([facility] [address]    2. Cann[facility] abuse with [facility] disorder [facility]    3. Alcohol consumption binge drinking      Plan:    Discussed increasing the celexa to 20mg/day.   Discussed risks and benefits of medications, as well as need for yearly lab work. Follow up with [facility] for continued therapy. Continue work with PCP to manage medical concerns, and [facility] for continued follow-up. No orders of the defined types were placed in this encounter.      Orders Placed This Encounter   Medications    citalopram (CELEXA) 20 MG tablet     Sig: Take 1 tablet by mouth daily     Dispense:  90 tablet     Refill:  0       Pt interventions:    Discussed importance of medication adherence, Discussed risks, benefits, side effects of medication and need for follow up treatment, Discussed benefits of referral for specialty care and Discussed self-care (sleep, nutrition, rewarding activities, social support, exercise)

## 2021-08-02 ENCOUNTER — TELEPHONE (OUTPATIENT)
Dept: ORTHOPEDIC SURGERY | Age: 31
End: 2021-08-02

## 2021-08-02 NOTE — TELEPHONE ENCOUNTER
I spoke with patient on results, he voiced understanding.    ----- Message from Clive Juarez MD sent at 8/1/2021  7:57 PM EDT -----  Normal MRI, PT.  No follow up apt necessary

## 2021-08-02 NOTE — TELEPHONE ENCOUNTER
I spoke with patient on results, he voiced understanding.    ----- Message from Jamilah Arroyo MD sent at 8/1/2021  7:56 PM EDT -----  Normal MRI, PT, no appt

## 2021-08-05 DIAGNOSIS — F90.0 ATTENTION DEFICIT HYPERACTIVITY DISORDER (ADHD), PREDOMINANTLY INATTENTIVE TYPE: ICD-10-CM

## 2021-08-05 RX ORDER — DEXTROAMPHETAMINE SACCHARATE, AMPHETAMINE ASPARTATE MONOHYDRATE, DEXTROAMPHETAMINE SULFATE AND AMPHETAMINE SULFATE 5; 5; 5; 5 MG/1; MG/1; MG/1; MG/1
20 CAPSULE, EXTENDED RELEASE ORAL EVERY MORNING
Qty: 30 CAPSULE | Refills: 0 | Status: SHIPPED | OUTPATIENT
Start: 2021-08-05 | End: 2021-09-07 | Stop reason: SDUPTHER

## 2021-08-05 NOTE — TELEPHONE ENCOUNTER
Called and spoke with psych, Berna Torres is only treating Shantel Dangelo for the Major Depression, so our office will need to continue to handle the Adderall for him     Please advise

## 2021-08-05 NOTE — TELEPHONE ENCOUNTER
Medication Requested: Adderall    Last visit: 7/29/2021  Next visit: 10/29/2021  Last refill: 7/7/2021    Med contract on file:  [x] yes   [] no    Last urine drug screen: 5/18/2021  Consistent with medication(s):    [x] yes   [] no    Please advise PCP out of office, last visit was with you.  Thank you

## 2021-08-27 ENCOUNTER — TELEMEDICINE (OUTPATIENT)
Dept: PSYCHIATRY | Age: 31
End: 2021-08-27
Payer: MEDICARE

## 2021-08-27 DIAGNOSIS — F12.180 CANNABIS ABUSE WITH CANNABIS-INDUCED ANXIETY DISORDER (HCC): ICD-10-CM

## 2021-08-27 DIAGNOSIS — F10.10 ALCOHOL CONSUMPTION BINGE DRINKING: ICD-10-CM

## 2021-08-27 DIAGNOSIS — F33.9 MAJOR DEPRESSIVE DISORDER, RECURRENT EPISODE WITH ANXIOUS DISTRESS (HCC): Primary | ICD-10-CM

## 2021-08-27 PROCEDURE — 99214 OFFICE O/P EST MOD 30 MIN: CPT | Performed by: NURSE PRACTITIONER

## 2021-08-27 RX ORDER — CITALOPRAM 20 MG/1
20 TABLET ORAL DAILY
Qty: 90 TABLET | Refills: 0 | Status: SHIPPED | OUTPATIENT
Start: 2021-08-27 | End: 2021-10-08 | Stop reason: SDUPTHER

## 2021-08-27 NOTE — PROGRESS NOTES
Behavioral Health Consultation  Annamary Dance, MSN, APRN-CNP, PMHNP-BC  8/27/2021, 10:42 AM      Time spent with Patient:  30 minutes  This was a telehealth visit. Patient Location: Home. Provider Location: Home office in [place], [place]  This virtual visit was conducted via interactive/real-time audio/video. Chief Complaint:follow up for anxiety and depression. [place]:  Reason for visit is medication management follow up. He has been compliant with medications. Pt reports that medications have  been working. Taurus states that he feels he is able to manage his moods better lately. He states that he is finding some relief from the anxiety and depressive symptoms. He states that he is continuing to use [facility] daily after work. He states that he finding some anxiety after using THC the morning after. Pt denies side effects from medications. Pt denies hallucinations. Pt reports there has been no changes to appetite. Pt reports sleep has been good. Pt reports  current exercise. Pt denies current suicidal ideation, plan and intent. Pt  denies current homicidal ideation, plan and [email or web address]). Social:single, no kids.  Working as a  for [employer], just started at the beginning of the year.  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  THC-daily for the last four months, using around 1 gummie before work and smoking work.    Past Psychiatric history:   The patient has a history of  depression, anxiety disorder and substance abuse.     Current treatment includes Anti-depressant: celexa.  Patient denies side effects from current treatment.  Previous treatment has included: Celexa- just started taking it in 06/21, stimulant- adderall- feels it works as needed and individual therapy- did this in the past, but stopped when it got uncomfortable. .    Family Mental Health history:   Pertinent family history: schizophrenia, alcoholism and drug abuse. MSE:    Appearance: alert, cooperative  Attention:Intact  Appetite: normal  Ambulation: unable to assess. Sleep disturbance: No  Loss of pleasure: No  Speech: spontaneous, normal rate, normal volume and well articulated  Mood: \"good\"   Affect: normal affect  Thought Content: intact  Insight: Fair  Judgment: Intact  Memory: Intact long-term and Intact short-term  Suicide Assessment: no suicidal ideation  Homicide Assessment: denies current homicidal ideation, plan and intent    History:      Review of Systems:   Constitutional: negative  HENT: negative  Eyes: negative  Respiratory: negative  Cardiovascular: negative  Gastrointestinal: negative  Genitourinary: negative  Musculoskeletal: negative  Skin:negative  Neurological:negative  Endo/Heme/Allergies:negative:       Current Outpatient Medications:     citalopram (CELEXA) 20 MG tablet, Take 1 tablet by mouth daily, Disp: 90 tablet, Rfl: 0    amphetamine-dextroamphetamine (ADDERALL XR) 20 MG extended release capsule, Take 1 capsule by mouth every morning for 30 days. , Disp: 30 capsule, Rfl: 0    meloxicam (MOBIC) 15 MG tablet, Take 1 tablet by mouth daily, Disp: 30 tablet, Rfl: 3    hydrOXYzine (ATARAX) 25 MG tablet, Take 1 tablet by mouth 3 times daily as needed for Anxiety, Disp: 90 tablet, Rfl: 0    vitamin D (ERGOCALCIFEROL) 1.25 MG (32236 UT) CAPS capsule, Take 1 capsule by mouth once a week, Disp: 4 capsule, Rfl: 5     PDMP Monitoring:    Last PDMP Jay as Reviewed [facility]):  Review User Review Instant Review Result   Chanel Nguyen 8/27/2021 10:37 AM Reviewed PDMP [1]     Last Controlled Substance Monitoring Documentation      Telemedicine from 7/30/2021 in [address]   Periodic Controlled Substance Monitoring  No signs of potential drug abuse or diversion identified.  filed at 07/30/2021 1231        Urine Drug Screenings (1 yr)     POCT Rapid Drug Screen  Collected: 5/26/2016 (Final result)    Complete Results          POCT Rapid Drug Screen  Collected: 2/9/2016 (Final result)    Complete Results          Urine Drug Screen  Collected: 5/18/2021 12:20 PM (Final result)    Complete Results          Drug Screen, Pain  Collected: 10/15/2019 10:07 PM (Final result)    Complete Results          Urine Drug Screen 9 Panel  Collected: 12/5/2019  4:51 PM (Final result)    Narrative: Ordering Provider: Berto Newman    Complete Results          Amphetamine, Urine, Confirmation, Quant  Collected: 12/5/2019  4:51 PM (Final result)    Narrative: Ordering Provider: Berto Newman    Complete Results              Medication Contract and Consent for Opioid Use Documents Filed     Patient Documents       Type of Document Status Date Received Received By Description     Medication Contract Signed 6/19/2019  2:50 PM [address] 16 checked and there were no signs of substance abuse, or prescription misuse. Social History     Socioeconomic History    Marital status: Single     Spouse name: Not on file    Number of children: Not on file    Years of education: Not on file    Highest education level: Not on file   Occupational History    Not on file   Tobacco Use    Smoking status: Never Smoker    Smokeless tobacco: Never Used   Substance and Sexual Activity    Alcohol use: Yes     Alcohol/week: 0.0 standard drinks     Comment: socially    Drug use: Not on file    Sexual activity: Not on file   Other Topics Concern    Not on file   Social History Narrative    Not on file     Social Determinants of Health     Financial Resource Strain: Low Risk     Difficulty of Paying Living Expenses: Not hard at all   Food Insecurity: No Food Insecurity    Worried About Running Out of Food in the Last Year: Never true    Sadie of Food in the Last Year: Never true   Transportation Needs: No Transportation Needs    Lack of Transportation (Medical):  No    Lack of Transportation (Non-Medical): No   Physical Activity:     Days of Exercise per Week:     Minutes of Exercise per Session:    Stress:     Feeling of Stress :    Social Connections:     Frequency of Communication with Friends and Family:     Frequency of Social Gatherings with Friends and Family:     Attends Zoroastrian Services:     Active Member of Clubs or Organizations:     Attends Club or Organization Meetings:     Marital Status:    Intimate Partner Violence:     Fear of Current or Ex-Partner:     Emotionally Abused:     Physically Abused:     Sexually Abused:        TOBACCO: Taurus  reports that he has never smoked. He has never used smokeless tobacco.  ETOH: Taurus  reports current alcohol use. No past medical history on file. Metabolic monitoring is being done by PCP. No family history on file. Last Labs: No results found for: LABA1C  No results found for: EAG   Lab Results   Component Value Date    WBC 6.5 05/18/2021    HGB 14.2 05/18/2021    HCT 47.7 05/18/2021    MCV 76.7 (L) 05/18/2021     05/18/2021    LYMPHOPCT 22 (L) 05/18/2021    RBC 6.22 (H) 05/18/2021    MCH 22.8 (L) 05/18/2021    MCHC 29.8 05/18/2021    RDW 16.1 (H) 05/18/2021          Lab Results   Component Value Date     05/18/2021    K 4.7 05/18/2021     05/18/2021    CO2 24 05/18/2021    BUN 8 05/18/2021    CREATININE 0.80 05/18/2021    GLUCOSE 88 05/18/2021    CALCIUM 9.4 05/18/2021    PROT 7.6 05/18/2021    LABALBU 4.5 05/18/2021    BILITOT 0.63 05/18/2021    ALKPHOS 42 05/18/2021    AST 23 05/18/2021    ALT 22 05/18/2021    LABGLOM >60 05/18/2021    GFRAA >60 05/18/2021      . last    Diagnosis:      1. Major depressive disorder, recurrent episode with anxious distress ([facility] [address]    2. [facility] with [facility]    3. Alcohol consumption binge drinking      Plan:    Discussed keeping meds the same for now. Discussed risks and benefits of medications, as well as need for yearly lab work. Follow up with [facility] for continued therapy. Continue work with PCP to manage medical concerns, and [facility] for continued follow-up. No orders of the defined types were placed in this encounter.      Orders Placed This Encounter   Medications    citalopram (CELEXA) 20 MG tablet     Sig: Take 1 tablet by mouth daily     Dispense:  90 tablet     Refill:  0       Pt interventions:    Discussed importance of medication adherence, Discussed risks, benefits, side effects of medication and need for follow up treatment, Discussed benefits of referral for specialty care and Discussed self-care (sleep, nutrition, rewarding activities, social support, exercise)

## 2021-09-07 DIAGNOSIS — F90.0 ATTENTION DEFICIT HYPERACTIVITY DISORDER (ADHD), PREDOMINANTLY INATTENTIVE TYPE: ICD-10-CM

## 2021-09-09 RX ORDER — DEXTROAMPHETAMINE SACCHARATE, AMPHETAMINE ASPARTATE MONOHYDRATE, DEXTROAMPHETAMINE SULFATE AND AMPHETAMINE SULFATE 5; 5; 5; 5 MG/1; MG/1; MG/1; MG/1
20 CAPSULE, EXTENDED RELEASE ORAL EVERY MORNING
Qty: 30 CAPSULE | Refills: 0 | Status: SHIPPED | OUTPATIENT
Start: 2021-09-09 | End: 2021-10-08 | Stop reason: SDUPTHER

## 2021-10-08 DIAGNOSIS — F90.0 ATTENTION DEFICIT HYPERACTIVITY DISORDER (ADHD), PREDOMINANTLY INATTENTIVE TYPE: ICD-10-CM

## 2021-10-08 RX ORDER — MELOXICAM 15 MG/1
15 TABLET ORAL DAILY
Qty: 30 TABLET | Refills: 3 | Status: SHIPPED | OUTPATIENT
Start: 2021-10-08 | End: 2022-03-09

## 2021-10-08 RX ORDER — DEXTROAMPHETAMINE SACCHARATE, AMPHETAMINE ASPARTATE MONOHYDRATE, DEXTROAMPHETAMINE SULFATE AND AMPHETAMINE SULFATE 5; 5; 5; 5 MG/1; MG/1; MG/1; MG/1
20 CAPSULE, EXTENDED RELEASE ORAL EVERY MORNING
Qty: 30 CAPSULE | Refills: 0 | Status: SHIPPED | OUTPATIENT
Start: 2021-10-08 | End: 2021-11-08 | Stop reason: SDUPTHER

## 2021-10-08 RX ORDER — CITALOPRAM 20 MG/1
20 TABLET ORAL DAILY
Qty: 90 TABLET | Refills: 0 | Status: SHIPPED | OUTPATIENT
Start: 2021-10-08 | End: 2021-11-08 | Stop reason: SDUPTHER

## 2021-10-08 NOTE — TELEPHONE ENCOUNTER
Patient is requesting refill on Mobic for right knee pain. Last script given 7/28/21 #30 with 3 refills.

## 2021-10-11 NOTE — TELEPHONE ENCOUNTER
Spoke w/ pt to inform him that his written rx for adderall is ready for p/u. Per pt, Sam Herrera will be picking up his script.

## 2021-10-29 ENCOUNTER — TELEPHONE (OUTPATIENT)
Dept: INTERNAL MEDICINE CLINIC | Age: 31
End: 2021-10-29

## 2021-11-02 ENCOUNTER — OFFICE VISIT (OUTPATIENT)
Dept: INTERNAL MEDICINE CLINIC | Age: 31
End: 2021-11-02
Payer: MEDICARE

## 2021-11-02 VITALS
HEART RATE: 66 BPM | BODY MASS INDEX: 28.2 KG/M2 | DIASTOLIC BLOOD PRESSURE: 82 MMHG | OXYGEN SATURATION: 99 % | HEIGHT: 70 IN | SYSTOLIC BLOOD PRESSURE: 136 MMHG | WEIGHT: 197 LBS

## 2021-11-02 DIAGNOSIS — H92.01 OTALGIA, RIGHT: ICD-10-CM

## 2021-11-02 DIAGNOSIS — R20.2 PARESTHESIA OF FINGER: ICD-10-CM

## 2021-11-02 DIAGNOSIS — F90.0 ATTENTION DEFICIT HYPERACTIVITY DISORDER (ADHD), PREDOMINANTLY INATTENTIVE TYPE: ICD-10-CM

## 2021-11-02 DIAGNOSIS — M25.511 ACUTE PAIN OF RIGHT SHOULDER: Primary | ICD-10-CM

## 2021-11-02 PROCEDURE — G8417 CALC BMI ABV UP PARAM F/U: HCPCS | Performed by: NURSE PRACTITIONER

## 2021-11-02 PROCEDURE — 99214 OFFICE O/P EST MOD 30 MIN: CPT | Performed by: NURSE PRACTITIONER

## 2021-11-02 PROCEDURE — G8484 FLU IMMUNIZE NO ADMIN: HCPCS | Performed by: NURSE PRACTITIONER

## 2021-11-02 PROCEDURE — G8427 DOCREV CUR MEDS BY ELIG CLIN: HCPCS | Performed by: NURSE PRACTITIONER

## 2021-11-02 PROCEDURE — 1036F TOBACCO NON-USER: CPT | Performed by: NURSE PRACTITIONER

## 2021-11-02 ASSESSMENT — ENCOUNTER SYMPTOMS
TROUBLE SWALLOWING: 0
VOMITING: 0
NAUSEA: 0
SORE THROAT: 0
CONSTIPATION: 0
RHINORRHEA: 0
COUGH: 0
SINUS PAIN: 0
DIARRHEA: 0
WHEEZING: 0
SHORTNESS OF BREATH: 0
BLOOD IN STOOL: 0
CHEST TIGHTNESS: 0
ABDOMINAL PAIN: 0

## 2021-11-02 NOTE — PROGRESS NOTES
Provider  EUSEBIO Lennon NP (Nurse Practitioner)    REASON FOR VISIT:  Routine outpatient follow up    HISTORY OF PRESENT ILLNESS:        History was obtained from the patient. Chidi Figueroa is a 35 y.o. male here today for 3 Month Follow-Up (Patient feels like adderral is wearing off by noon, hard to focus after 1pm. ), Depression (Patient states steady ), and Shoulder Injury (onset 2 weeks ago, patient was playing football and landed on it.  Minimal pain all day, pain when laying on it )    R shoulder injury  Injury in football 2 weeks ago  Endorses slight pain aggravated with use and laying on R side  Denies using OTC pain medications   Denies weakness/decreased  in R hand  Endorses numbness and tingling in first 2 right fingers    Patient Active Problem List   Diagnosis    High risk medication use    ADD (attention deficit disorder)    Vitamin D deficiency    Chronic fatigue    [facility] episode of recurrent major [facility] disorder [facility] [address]    BMI 27.0-27.9,adult    Nephrolithiasis    Epididymitis, left    Anxiety    Moderate recurrent major depression (HCC)    Major depressive disorder, recurrent episode with anxious distress ([facility] [address]    Cannabis abuse with cannabis-induced anxiety disorder ([facility] [address]    Alcohol consumption binge drinking       Health Maintenance Due   Topic Date Due    Hepatitis C screen  Never done    Pneumococcal 0-64 years Vaccine (1 of 2 - PPSV23) Never done    HIV screen  Never done    DTaP/Tdap/Td vaccine (7 - Td or Tdap) 09/03/2014    COVID-19 Vaccine (2 - Booster for [employer] series) 06/04/2021    Flu vaccine (1) Never done       MEDICATIONS:      Current Outpatient Medications   Medication Sig Dispense Refill    meloxicam (MOBIC) 15 MG tablet Take 1 tablet by mouth daily 30 tablet 3    hydrOXYzine (ATARAX) 25 MG tablet Take 1 tablet by mouth 3 times daily as needed for Anxiety 90 tablet 0    vitamin D (ERGOCALCIFEROL) 1.25 MG (19438 UT) CAPS capsule Take 1 capsule by mouth once a week 4 capsule 5    citalopram (CELEXA) 20 MG tablet Take 1 tablet by mouth daily 90 tablet 0    amphetamine-dextroamphetamine (ADDERALL XR) 20 MG extended release capsule Take 1 capsule by mouth every morning for 30 days. 30 capsule 0     No current facility-administered medications for this visit. ALLERGIES:    No Known Allergies      SOCIAL HISTORY    Reviewed and no change from previous record. Taurus  reports that he has never smoked. He has never used smokeless tobacco.    FAMILY HISTORY:    Reviewed and No change from previous visit    REVIEW OF SYSTEMS:    Review of Systems   Constitutional: Negative for appetite change, diaphoresis, fatigue, fever and unexpected weight change. HENT: Negative for ear pain, postnasal drip, rhinorrhea, sinus pain, sore throat and trouble swallowing. Eyes: Negative for visual disturbance. Respiratory: Negative for cough, chest tightness, shortness of breath and wheezing. Cardiovascular: Negative for chest pain, palpitations and leg swelling. Gastrointestinal: Negative for abdominal pain, blood in stool, constipation, diarrhea, nausea and vomiting. Endocrine: Negative for polydipsia, polyphagia and polyuria. Genitourinary: Negative for difficulty urinating, dysuria and flank pain. Musculoskeletal: Negative for arthralgias and myalgias. Skin: Negative for rash and wound. Neurological: Positive for numbness (First 2 fingers). Negative for dizziness, syncope and weakness. Psychiatric/Behavioral: Positive for decreased concentration and dysphoric mood. All other systems reviewed and are negative.       PHYSICAL EXAM:      Vitals:    11/02/21 1044   BP: 136/82   Pulse: 66   SpO2: 99%   Weight: 197 lb (89.4 kg)   Height: 5' 10\" (1.778 m)     BP Readings from Last 3 Encounters:   11/02/21 136/82   07/29/21 132/84   06/16/21 124/72      Wt Readings from Last 3 Encounters:   11/02/21 197 lb (89.4 kg)   07/29/21 188 lb (85.3 kg)   06/16/21 202 lb (91.6 kg)       Physical Exam  Vitals reviewed. Constitutional:       Appearance: Normal appearance. HENT:      Head: Normocephalic. Right Ear: Tympanic membrane, ear canal and external ear normal.      Left Ear: Tympanic membrane, ear canal and external ear normal.   Cardiovascular:      Rate and Rhythm: Normal rate and regular rhythm. Pulses: Normal pulses. Heart sounds: Normal heart sounds. Pulmonary:      Effort: Pulmonary effort is normal.      Breath sounds: Normal breath sounds. Abdominal:      General: Bowel sounds are normal.      Palpations: Abdomen is soft. Tenderness: There is no right CVA tenderness or left CVA tenderness. Musculoskeletal:         General: Tenderness (R shoulder) and signs of injury present. No swelling or deformity. Normal range of motion. Skin:     General: Skin is warm and dry. Neurological:      General: No focal deficit present. Mental Status: He is alert and oriented to person, place, and time. Psychiatric:         Mood and Affect: Mood normal.         Behavior: Behavior normal.         Thought Content: Thought content normal.         Judgment: Judgment normal.          LABORATORY FINDINGS:    CBC:  Lab Results   Component Value Date    WBC 6.5 05/18/2021    HGB 14.2 05/18/2021     05/18/2021       BMP:    Lab Results   Component Value Date     05/18/2021    K 4.7 05/18/2021     05/18/2021    CO2 24 05/18/2021    BUN 8 05/18/2021    CREATININE 0.80 05/18/2021    GLUCOSE 88 05/18/2021    GLUCOSE 90 12/05/2019       HEMOGLOBIN A1C: No results found for: LABA1C    FASTING LIPID PANEL:  Lab Results   Component Value Date    CHOL 161 12/05/2019    HDL 42 12/05/2019    TRIG 75 12/05/2019       ASSESSMENT AND PLAN:      1. Acute pain of right shoulder  - continue supportive care measures  - XR THORACIC SPINE (3 VIEWS); Future  - XR CERVICAL SPINE (2-3 VIEWS); Future    2.  Paresthesia of finger  - XR THORACIC SPINE (3 VIEWS); Future  - XR CERVICAL SPINE (2-3 VIEWS); Future    3. Otalgia, right  - reports intermittent   - no signs of infection noted  - denies URI symptoms  - continue to monitor    4. Attention deficit hyperactivity disorder (ADHD), predominantly inattentive type  - encourage patient to follow up with [place] or [facility] for evaluation of ADHD and titration of medication. Patient may benefit from medication change due to decreased concentration and continued depression      FOLLOW UP AND INSTRUCTIONS:   Return in about 2 weeks (around 11/16/2021) for follow up xrays. Taurus received counseling on the following healthy behaviors: nutrition, exercise and medication adherence    Discussed use, benefit, and side effects of prescribed medications. Barriers to medication compliance addressed. All patient questions answered. Patient voiced understanding. Patient given educational materials - see patient instructions    EUSEBIO Lares - CNP   [facility]  11/11/2021, 5:28 PM    Please note that this chart was generated using voice recognition Dragon dictation software. Although everyeffort was made to ensure the accuracy of this automated transcription, some errors in transcription may have occurred.

## 2021-11-05 ENCOUNTER — HOSPITAL ENCOUNTER (OUTPATIENT)
Facility: CLINIC | Age: 31
Discharge: HOME | End: 2021-11-07
Payer: MEDICARE

## 2021-11-05 ENCOUNTER — HOSPITAL ENCOUNTER (OUTPATIENT)
Dept: GENERAL RADIOLOGY | Facility: CLINIC | Age: 31
Discharge: HOME | End: 2021-11-07
Payer: MEDICARE

## 2021-11-05 DIAGNOSIS — R20.2 PARESTHESIA OF FINGER: ICD-10-CM

## 2021-11-05 DIAGNOSIS — M25.511 ACUTE PAIN OF RIGHT SHOULDER: ICD-10-CM

## 2021-11-05 PROCEDURE — 72072 X-RAY EXAM THORAC SPINE 3VWS: CPT

## 2021-11-05 PROCEDURE — 72040 X-RAY EXAM NECK SPINE 2-3 VW: CPT

## 2021-11-08 DIAGNOSIS — F90.0 ATTENTION DEFICIT HYPERACTIVITY DISORDER (ADHD), PREDOMINANTLY INATTENTIVE TYPE: ICD-10-CM

## 2021-11-08 RX ORDER — DEXTROAMPHETAMINE SACCHARATE, AMPHETAMINE ASPARTATE MONOHYDRATE, DEXTROAMPHETAMINE SULFATE AND AMPHETAMINE SULFATE 5; 5; 5; 5 MG/1; MG/1; MG/1; MG/1
20 CAPSULE, EXTENDED RELEASE ORAL EVERY MORNING
Qty: 30 CAPSULE | Refills: 0 | Status: SHIPPED | OUTPATIENT
Start: 2021-11-08 | End: 2021-12-13 | Stop reason: SDUPTHER

## 2021-11-08 RX ORDER — CITALOPRAM 20 MG/1
20 TABLET ORAL DAILY
Qty: 90 TABLET | Refills: 0 | Status: SHIPPED | OUTPATIENT
Start: 2021-11-08 | End: 2021-12-13 | Stop reason: SDUPTHER

## 2021-11-09 ENCOUNTER — TELEPHONE (OUTPATIENT)
Dept: INTERNAL MEDICINE CLINIC | Age: 31
End: 2021-11-09

## 2021-11-15 RX ORDER — SENNOSIDES 8.6 MG
650 CAPSULE ORAL EVERY 8 HOURS PRN
COMMUNITY
Start: 2021-10-23

## 2021-11-16 ENCOUNTER — OFFICE VISIT (OUTPATIENT)
Dept: INTERNAL MEDICINE CLINIC | Age: 31
End: 2021-11-16
Payer: MEDICARE

## 2021-11-16 VITALS
OXYGEN SATURATION: 99 % | DIASTOLIC BLOOD PRESSURE: 82 MMHG | HEIGHT: 70 IN | SYSTOLIC BLOOD PRESSURE: 134 MMHG | BODY MASS INDEX: 25.2 KG/M2 | WEIGHT: 176 LBS | HEART RATE: 70 BPM

## 2021-11-16 DIAGNOSIS — M25.511 ACUTE PAIN OF RIGHT SHOULDER: Primary | ICD-10-CM

## 2021-11-16 DIAGNOSIS — L30.9 DERMATITIS: ICD-10-CM

## 2021-11-16 PROCEDURE — 1036F TOBACCO NON-USER: CPT | Performed by: NURSE PRACTITIONER

## 2021-11-16 PROCEDURE — G8427 DOCREV CUR MEDS BY ELIG CLIN: HCPCS | Performed by: NURSE PRACTITIONER

## 2021-11-16 PROCEDURE — 99214 OFFICE O/P EST MOD 30 MIN: CPT | Performed by: NURSE PRACTITIONER

## 2021-11-16 PROCEDURE — G8417 CALC BMI ABV UP PARAM F/U: HCPCS | Performed by: NURSE PRACTITIONER

## 2021-11-16 PROCEDURE — G8484 FLU IMMUNIZE NO ADMIN: HCPCS | Performed by: NURSE PRACTITIONER

## 2021-11-16 RX ORDER — TIZANIDINE 2 MG/1
2 TABLET ORAL 3 TIMES DAILY PRN
Qty: 30 TABLET | Refills: 0 | Status: SHIPPED | OUTPATIENT
Start: 2021-11-16 | End: 2022-03-09

## 2021-11-16 ASSESSMENT — ENCOUNTER SYMPTOMS
SHORTNESS OF BREATH: 0
VOMITING: 0
SINUS PAIN: 0
DIARRHEA: 0
CONSTIPATION: 0
NAUSEA: 0
RHINORRHEA: 0
TROUBLE SWALLOWING: 0
ABDOMINAL PAIN: 0
BLOOD IN STOOL: 0
SORE THROAT: 0
WHEEZING: 0
COUGH: 0
CHEST TIGHTNESS: 0

## 2021-11-16 NOTE — PROGRESS NOTES
Visit Information    Have you changed or started any medications since your last visit including any over-the-counter medicines, vitamins, or herbal medicines? no   Are you having any side effects from any of your medications? -  no  Have you stopped taking any of your medications? Is so, why? -  no    Have you seen any other physician or provider since your last visit? No  Have you had any other diagnostic tests since your last visit? Yes - Records Obtained  Have you been seen in the emergency room and/or had an admission to a hospital since we last saw you? No  Have you had your routine dental cleaning in the past 6 months? yes -     Have you activated your [employer] account? If not, what are your barriers?  Yes     Patient Care Team:  EUSEBIO Gonzalez CNP as PCP - General (Nurse Practitioner)  EUSEBIO Gonzalez CNP as PCP - [facility] Empaneled Provider  EUSEBIO Pace NP (Nurse Practitioner)    Medical History Review  Past Medical, Family, and Social History reviewed and does contribute to the patient presenting condition    Health Maintenance   Topic Date Due    Hepatitis C screen  Never done    Pneumococcal 0-64 years Vaccine (1 of 2 - PPSV23) Never done    HIV screen  Never done    DTaP/Tdap/Td vaccine (7 - Td or Tdap) 09/03/2014    COVID-19 Vaccine (2 - Booster for [employer] series) 06/04/2021    Flu vaccine (1) Never done    Hepatitis B vaccine  Completed    Hib vaccine  Completed    Varicella vaccine  Completed    Hepatitis A vaccine  Aged Out    Meningococcal (ACWY) vaccine  Aged Out         [address] [address]  Dept: [phone]  Dept Fax: [phone]    OfficeProgress/Follow Up Note  Date of patient's visit: 11/16/2021  Patient's Name:  Noe Wilson YOB: 1990            Patient Care Team:  EUSEBIO Gonzalez CNP as PCP - General (Nurse Practitioner)  EUSEBIO Gonzalez CNP as PCP - [facility] Empaneled Provider  EUSEBIO Lomeli NP (Nurse Practitioner)    REASON FOR VISIT:  Routine outpatient follow up    HISTORY OF PRESENT ILLNESS:        History was obtained from the patient.      Malik Burton is a 35 y.o. male here today for X-ray  (results ) and Rash (itches intermittent )    R shoulder pain  Onset 4 weeks ago  Xray cervical and thoracic negative for acute injury  Reports pain is improving slightly with use of OTC pain medication  Endorses tight muscle in R shoulder limiting ROM  Denies paresthesias/weakness in upper extremity      Patient Active Problem List   Diagnosis    High risk medication use    ADD (attention deficit disorder)    Vitamin D deficiency    Chronic fatigue    Mild episode of recurrent major depressive disorder ([address]    BMI 27.0-27.9,adult    Nephrolithiasis    Epididymitis, left    Anxiety    Moderate recurrent major depression (HCC)    Major depressive disorder, recurrent episode with anxious distress ([address]    Cannabis abuse with cannabis-induced anxiety disorder ([facility] [address]    Alcohol consumption binge drinking       Health Maintenance Due   Topic Date Due    Hepatitis C screen  Never done    Pneumococcal 0-64 years Vaccine (1 of 2 - PPSV23) Never done    HIV screen  Never done    DTaP/Tdap/Td vaccine (7 - Td or Tdap) 09/03/2014    COVID-19 Vaccine (2 - Booster for [employer] series) 06/04/2021    Flu vaccine (1) Never done       MEDICATIONS:      Current Outpatient Medications   Medication Sig Dispense Refill    tiZANidine (ZANAFLEX) 2 MG tablet Take 1 tablet by mouth 3 times daily as needed (pain) 30 tablet 0    triamcinolone (KENALOG) 0.1 % ointment Apply topically 2 times daily for 14 days 80 g 0    acetaminophen (TYLENOL) 650 MG extended release tablet Take 650 mg by mouth every 8 hours as needed      citalopram (CELEXA) 20 MG tablet Take 1 tablet by mouth daily 90 tablet 0    amphetamine-dextroamphetamine (ADDERALL XR) 20 MG extended release capsule Take 1 capsule by mouth every morning for 30 days. 30 capsule 0    meloxicam (MOBIC) 15 MG tablet Take 1 tablet by mouth daily 30 tablet 3    hydrOXYzine (ATARAX) 25 MG tablet Take 1 tablet by mouth 3 times daily as needed for Anxiety 90 tablet 0    vitamin D (ERGOCALCIFEROL) 1.25 MG (40460 UT) CAPS capsule Take 1 capsule by mouth once a week 4 capsule 5     No current facility-administered medications for this visit. ALLERGIES:    No Known Allergies      SOCIAL HISTORY    Reviewed and no change from previous record. Taurus  reports that he has never smoked. He has never used smokeless tobacco.    FAMILY HISTORY:    Reviewed and No change from previous visit    REVIEW OF SYSTEMS:    Review of Systems   Constitutional: Negative for appetite change, chills, diaphoresis, fatigue, fever and unexpected weight change. HENT: Negative for ear pain, postnasal drip, rhinorrhea, sinus pain, sore throat and trouble swallowing. Eyes: Negative for visual disturbance. Respiratory: Negative for cough, chest tightness, shortness of breath and wheezing. Cardiovascular: Negative for chest pain, palpitations and leg swelling. Gastrointestinal: Negative for abdominal pain, blood in stool, constipation, diarrhea, nausea and vomiting. Endocrine: Negative for polydipsia, polyphagia and polyuria. Genitourinary: Negative for difficulty urinating, dysuria and flank pain. Musculoskeletal: Positive for arthralgias (R shoulder). Negative for myalgias. Skin: Positive for rash (abdomen). Negative for wound. Neurological: Negative for dizziness, syncope and weakness. All other systems reviewed and are negative.       PHYSICAL EXAM:      Vitals:    11/16/21 0941   BP: 134/82   Pulse: 70   SpO2: 99%   Weight: 176 lb (79.8 kg)   Height: 5' 10\" (1.778 m)     BP Readings from Last 3 Encounters:   11/16/21 134/82   11/02/21 136/82   07/29/21 132/84      Wt Readings from Last 3 Encounters:   11/16/21 176 lb (79.8 kg)   11/02/21 197 lb (89.4 kg)   07/29/21 188 lb (85.3 kg)       Physical Exam  Vitals reviewed. Constitutional:       Appearance: Normal appearance. HENT:      Head: Normocephalic. Cardiovascular:      Rate and Rhythm: Normal rate and regular rhythm. Pulses: Normal pulses. Heart sounds: Normal heart sounds. Pulmonary:      Effort: Pulmonary effort is normal.      Breath sounds: Normal breath sounds. Abdominal:      General: Bowel sounds are normal.      Palpations: Abdomen is soft. Tenderness: There is no right CVA tenderness or left CVA tenderness. Musculoskeletal:         General: Tenderness (R shoulder) present. No swelling or deformity. Skin:     General: Skin is warm and dry. Findings: Rash (papular rash noted above umbilicus) present. Neurological:      General: No focal deficit present. Mental Status: He is alert and oriented to person, place, and time. Psychiatric:         Mood and Affect: Mood normal.         Behavior: Behavior normal.         Thought Content: Thought content normal.         Judgment: Judgment normal.          LABORATORY FINDINGS:    CBC:  Lab Results   Component Value Date    WBC 6.5 05/18/2021    HGB 14.2 05/18/2021     05/18/2021       BMP:    Lab Results   Component Value Date     05/18/2021    K 4.7 05/18/2021     05/18/2021    CO2 24 05/18/2021    BUN 8 05/18/2021    CREATININE 0.80 05/18/2021    GLUCOSE 88 05/18/2021    GLUCOSE 90 12/05/2019       HEMOGLOBIN A1C: No results found for: LABA1C    FASTING LIPID PANEL:  Lab Results   Component Value Date    CHOL 161 12/05/2019    HDL 42 12/05/2019    TRIG 75 12/05/2019       ASSESSMENT AND PLAN:      1. Acute pain of right shoulder  - continue use of anti-inflammatories, and stretches PRN  - tiZANidine (ZANAFLEX) 2 MG tablet; Take 1 tablet by mouth 3 times daily as needed (pain)  Dispense: 30 tablet; Refill: 0    2.  Dermatitis  - denies h/o allergies or allergen exposure, denies family/close contacts with similar rash  - denies pain from rash, reports rash is pruritic in nature (onset a couple of months ago)  - triamcinolone (KENALOG) 0.1 % ointment; Apply topically 2 times daily for 14 days  Dispense: 80 g; Refill: 0  - encourage to use mild soaps and detergents    FOLLOW UP AND INSTRUCTIONS:   Return if symptoms worsen or fail to improve. Taurus received counseling on the following healthy behaviors: nutrition, exercise and medication adherence    Discussed use, benefit, and side effects of prescribed medications. Barriers to medication compliance addressed. All patient questions answered. Patient voiced understanding. Patient given educational materials - see patient instructions    EUSEBIO Pressley - CNP   [facility]  11/16/2021, 11:24 AM    Please note that this chart was generated using voice recognition Dragon dictation software. Although everyeffort was made to ensure the accuracy of this automated transcription, some errors in transcription may have occurred.

## 2021-12-13 DIAGNOSIS — F90.0 ATTENTION DEFICIT HYPERACTIVITY DISORDER (ADHD), PREDOMINANTLY INATTENTIVE TYPE: ICD-10-CM

## 2021-12-13 RX ORDER — CITALOPRAM 20 MG/1
20 TABLET ORAL DAILY
Qty: 90 TABLET | Refills: 0 | Status: SHIPPED | OUTPATIENT
Start: 2021-12-13 | End: 2022-01-24 | Stop reason: SDUPTHER

## 2021-12-13 RX ORDER — DEXTROAMPHETAMINE SACCHARATE, AMPHETAMINE ASPARTATE MONOHYDRATE, DEXTROAMPHETAMINE SULFATE AND AMPHETAMINE SULFATE 5; 5; 5; 5 MG/1; MG/1; MG/1; MG/1
20 CAPSULE, EXTENDED RELEASE ORAL EVERY MORNING
Qty: 30 CAPSULE | Refills: 0 | Status: SHIPPED | OUTPATIENT
Start: 2021-12-13 | End: 2022-01-14 | Stop reason: SDUPTHER

## 2022-01-21 ENCOUNTER — OFFICE VISIT (OUTPATIENT)
Dept: BEHAVIORAL/MENTAL HEALTH CLINIC | Age: 32
End: 2022-01-21
Payer: MEDICARE

## 2022-01-21 DIAGNOSIS — F33.9 MAJOR DEPRESSIVE DISORDER, RECURRENT EPISODE WITH ANXIOUS DISTRESS (HCC): Primary | ICD-10-CM

## 2022-01-21 PROCEDURE — 90834 PSYTX W PT 45 MINUTES: CPT | Performed by: PSYCHOLOGIST

## 2022-01-21 PROCEDURE — 1036F TOBACCO NON-USER: CPT | Performed by: PSYCHOLOGIST

## 2022-01-24 RX ORDER — CITALOPRAM 20 MG/1
20 TABLET ORAL DAILY
Qty: 90 TABLET | Refills: 0 | Status: SHIPPED | OUTPATIENT
Start: 2022-01-24 | End: 2022-02-14 | Stop reason: SDUPTHER

## 2022-02-07 ENCOUNTER — OFFICE VISIT (OUTPATIENT)
Dept: BEHAVIORAL/MENTAL HEALTH CLINIC | Age: 32
End: 2022-02-07
Payer: MEDICARE

## 2022-02-07 DIAGNOSIS — F33.1 MAJOR DEPRESSIVE DISORDER, RECURRENT EPISODE, MODERATE WITH ANXIOUS DISTRESS (HCC): Primary | ICD-10-CM

## 2022-02-07 PROCEDURE — 1036F TOBACCO NON-USER: CPT | Performed by: PSYCHOLOGIST

## 2022-02-07 PROCEDURE — 90837 PSYTX W PT 60 MINUTES: CPT | Performed by: PSYCHOLOGIST

## 2022-02-07 NOTE — PROGRESS NOTES
Jesse Whatley M.A. Psychology Doctoral Trainee    Supervising Clinical Psychologists:  Rosa Isela Bob, Ph.D. Radha Austin,  Ph.D. Alex Yu Psy.D. Caitlyn Low        Visit Date: 2/7/2022   Time of appointment: 4:00-5:01PM  Time spent with Patient: 61 minutes. This is patient's third appointment. Reason for Consult:  Depression and Anxiety     Referring Provider/PCP:    No ref. provider found  EUSEIBO Alvarez - CNP      Pt provided informed consent for the behavioral health program. Discussed with patient model of service to include the limits of confidentiality (i.e. abuse reporting, suicide intervention, etc.) and short-term intervention focused approach. Pt indicated understanding. Drew Gonzalez is a 28 y.o. male who presents for follow up of depression symptoms. Taurus reported some success with coping strategies but he added he needs to continue to practice them. Taurus and [facility] explored early stages of a thought record exercise. Taurus reported a negative situation related to depression including waking up and feeling unmotivated and depressed. Taurus had difficulty with each stage of the thought record exercise. Taurus and [facility] processed why completing this exercise in session was difficult. The [facility] learned that he has a history of suppression of emotions making labelling and/or identifying feelings difficult. To help address this, the [facility] introduced a feelings-wheel handout for Taurus to keep nearby as a he completes these and related exercises. The [facility] also learned in session that Taurus as a strong, generalized feeling of being a burden to others which he believes is a consistent thought/internal state that contributes to his depression symptoms. In fact, Taurus reported to the [facility] in session that he believes he is a burden to the [facility].  The [facility] focused validating and explaining this state while also reassuring Taurus that he is not a burden in the therapy room. Taurus was also praised for his level of vulnerability in session. Gee Ortiz and [facility]RE [facility] will continue CBT-based techniques to help address ongoing depression symptoms. [facility] and Gee Ortiz will [place] back on recommendation 2) as Gee Ortiz had no updates. Previous Recommendations:    1) Pt will practice coping strategies. 2) Pt will coordinate with PCP to get refill for mental health medications. 3) [facility] and pt will pick back up on CBT techniques at follow-up, specifically working through thoughts and automatic thoughts in response to negative situations that contribute to mental health symptoms. MENTAL STATUS EXAM  Mood was within normal limits with calm and tearful affect. Suicidal ideation was denied. Homicidal ideation was denied. Hygiene was good . Dress was appropriate. Behavior was Within Normal Limits with No observation or self-report of difficulties ambulating. Attitude was Cooperative. Eye-contact was good. Speech: rate - WNL, rhythm - WNL, volume - WNL. Verbalizations were goal directed. Thought processes were intact and goal-oriented without evidence of delusions, hallucinations, obsessions, or lucia; with no cognitive distortions. Associations were characterized by intact cognitive processes. Pt was oriented to person, place, time, and general circumstances;  recent:  good and remote:  good. Insight and judgment were estimated to be good, AEB, a good  understanding of cyclical maladaptive patterns, and the ability to use insight to inform behavior change. ASSESSMENT  Taurus Lovell presented to the appointment today for evaluation and treatment of symptoms of depression and anxiety.   He is currently deemed low risk (Risk Factors: male, history of suicidal thoughts, loss of romantic relationship; Protective Factors: close friendships, educational attainment, positive therapeutic relationships with medical provider, no history of self-harm behavior, no past suicide attempts) and no risk  risk to himself or others and meets criteria for major depressive disorder, moderate, recurrent episode with anxious distress. Bjorn Valladares and TRE VARG[facility] explored thought record exercises to help address depression symptoms and will continue with these techniques in future sessions. Bjorn Valladares was in agreement with recommendations. PHQ Scores 1/13/2022 6/16/2021 4/8/2021 12/18/2020 6/5/2019   PHQ2 Score 4 2 1 4 0   PHQ9 Score 10 2 1 11 0     Interpretation of Total Score Depression Severity: 1-4 = Minimal depression, 5-9 = Mild depression, 10-14 = Moderate depression, 15-19 = Moderately severe depression, 20-27 = Severe depression    How often pt has had thoughts of death or hurting self (if PHQ positive for depression):       No flowsheet data found. Interpretation of JESSICA-7 score: 5-9 = mild anxiety, 10-14 = moderate anxiety, 15+ = severe anxiety. Recommend referral to behavioral health for scores 10 or greater. DIAGNOSIS  Taurus was seen today for depression and anxiety. Diagnoses and all orders for this visit:    Major depressive disorder, recurrent episode, moderate with anxious distress ([facility] [address]          INTERVENTION  Trained in relaxation strategies, Provided education, Established rapport, Supportive techniques and Identified maladaptive thoughts      PLAN  1. Write down a negative situation that you believe contributes to your depression symptoms (e.g., fight with someone, being alone, or even just waking up and feeling sad). 2. Write down all of the feelings that correspond to this situation (e.g., sad, angry, lonely, frustrated, down). See attached feelings wheel as a resource to help you with labeling feelings. 3. Write down one automatic thought that corresponds to each feeling. 4. Use your feelings wheel to help identify feelings that correspond to negative situations.   5. Meet with TRE VARGAS [facility] in approximately 2 weeks for follow-up. INTERACTIVE COMPLEXITY  Is interactive complexity present?   No  Reason:  N/A  Additional Supporting Information:  N/A       Electronically signed by Juan Quinones on 2/7/22 at 3:57 PM EST

## 2022-02-11 PROBLEM — F33.1 MAJOR DEPRESSIVE DISORDER, RECURRENT EPISODE, MODERATE WITH ANXIOUS DISTRESS (HCC): Status: ACTIVE | Noted: 2022-02-11

## 2022-02-14 DIAGNOSIS — F90.0 ATTENTION DEFICIT HYPERACTIVITY DISORDER (ADHD), PREDOMINANTLY INATTENTIVE TYPE: ICD-10-CM

## 2022-02-14 RX ORDER — CITALOPRAM 20 MG/1
20 TABLET ORAL DAILY
Qty: 90 TABLET | Refills: 0 | Status: SHIPPED | OUTPATIENT
Start: 2022-02-14 | End: 2022-06-02 | Stop reason: ALTCHOICE

## 2022-02-15 RX ORDER — DEXTROAMPHETAMINE SACCHARATE, AMPHETAMINE ASPARTATE MONOHYDRATE, DEXTROAMPHETAMINE SULFATE AND AMPHETAMINE SULFATE 5; 5; 5; 5 MG/1; MG/1; MG/1; MG/1
20 CAPSULE, EXTENDED RELEASE ORAL EVERY MORNING
Qty: 30 CAPSULE | Refills: 0 | Status: SHIPPED | OUTPATIENT
Start: 2022-02-15 | End: 2022-03-09 | Stop reason: SDUPTHER

## 2022-02-21 ENCOUNTER — OFFICE VISIT (OUTPATIENT)
Dept: BEHAVIORAL/MENTAL HEALTH CLINIC | Age: 32
End: 2022-02-21
Payer: MEDICARE

## 2022-02-21 DIAGNOSIS — F33.1 MAJOR DEPRESSIVE DISORDER, RECURRENT EPISODE, MODERATE WITH ANXIOUS DISTRESS (HCC): Primary | ICD-10-CM

## 2022-02-21 PROCEDURE — 90837 PSYTX W PT 60 MINUTES: CPT | Performed by: PSYCHOLOGIST

## 2022-02-21 PROCEDURE — 1036F TOBACCO NON-USER: CPT | Performed by: PSYCHOLOGIST

## 2022-02-21 NOTE — PROGRESS NOTES
Mary Goldstein M.A. Psychology Doctoral Trainee    Supervising Clinical Psychologists:  Octavia Ronquillo, Ph.D. Aylin Smith,  Ph.D. Sean Sanchez Psy.D. Caitlyn Low        Visit Date: 2/21/2022   Time of appointment: 3:00-4:02PM  Time spent with Patient: 62 minutes. This is patient's fourth appointment. Reason for Consult:  Depression and Anxiety     Referring Provider/PCP:    No ref. provider found  Cecilia Candy, APRN - CNP      Pt provided informed consent for the behavioral health program. Discussed with patient model of service to include the limits of confidentiality (i.e. abuse reporting, suicide intervention, etc.) and short-term intervention focused approach. Pt indicated understanding. Herberth Rodriguez is a 28 y.o. male who presents for follow up of depression symptoms. Taurus reported symptoms are about the same and he's back on Celexa since Friday 2/18. He reported he has been working breathing exercises and they are useful. Taurus and [facility] processed some of his experiences of depression and they continued to learn the importance of feeling like a burden as a maintaining factor in relation to is symptoms. Regarding the origin of these beliefs, Taurus reported limited insight but added he believes that because his mom was a single mom, he learned early to not tell her about his mental health difficulties to not burden her. Taurus and [facility] completed a thought record in session, with the situation being him stopping taking with his ex (a situation that contributes to depression symptoms). Indeed, regarding previous recommendations, Taurus completed 1-4 in light of above. The activity was useful and there is continued need to explore and practice such techniques to help address his symptoms of depression and concerns about being a burden to those he loves. Previous Recommendations:     1.  Write down a negative situation that you believe contributes to your depression symptoms (e.g., fight with someone, being alone, or even just waking up and feeling sad). 2. Write down all of the feelings that correspond to this situation (e.g., sad, angry, lonely, frustrated, down). See attached feelings wheel as a resource to help you with labeling feelings. 3. Write down one automatic thought that corresponds to each feeling. 4. Use your feelings wheel to help identify feelings that correspond to negative situations. 5. Meet with [facility] in approximately 2 weeks for follow-up. MENTAL STATUS EXAM  Mood was within normal limits with calm affect. Suicidal ideation was denied. Homicidal ideation was denied. Hygiene was good . Dress was appropriate. Behavior was Within Normal Limits with No observation or self-report of difficulties ambulating. Attitude was Cooperative. Eye-contact was good. Speech: rate - WNL, rhythm - WNL, volume - WNL. Verbalizations were goal directed. Thought processes were intact and goal-oriented without evidence of delusions, hallucinations, obsessions, or lucia; with no cognitive distortions. Associations were characterized by intact cognitive processes. Pt was oriented to person, place, time, and general circumstances;  recent:  good and remote:  good. Insight and judgment were estimated to be good, AEB, a good  understanding of cyclical maladaptive patterns, and the ability to use insight to inform behavior change. ASSESSMENT  Taurus Castillo Rule presented to the appointment today for evaluation and treatment of symptoms of depression and anxiety.   He is currently deemed low risk (Risk Factors: male, history of suicidal thoughts, loss of romantic relationship; Protective Factors: close friendships, educational attainment, positive therapeutic relationships with medical provider, no history of self-harm behavior, no past suicide attempts) and no risk  risk to himself or others and meets criteria for major depressive disorder, moderate, recurrent episode with anxious distress. Taurus and [facility] explored thought record exercises in session as a treatment for depression symptoms, and will continue to build these skills. Taurus was in agreement with recommendations. PHQ Scores 1/13/2022 6/16/2021 4/8/2021 12/18/2020 6/5/2019   PHQ2 Score 4 2 1 4 0   PHQ9 Score 10 2 1 11 0     Interpretation of Total Score Depression Severity: 1-4 = Minimal depression, 5-9 = Mild depression, 10-14 = Moderate depression, 15-19 = Moderately severe depression, 20-27 = Severe depression    How often pt has had thoughts of death or hurting self (if PHQ positive for depression):       No flowsheet data found. Interpretation of JESSICA-7 score: 5-9 = mild anxiety, 10-14 = moderate anxiety, 15+ = severe anxiety. Recommend referral to behavioral health for scores 10 or greater. DIAGNOSIS  Taurus was seen today for depression and anxiety. Diagnoses and all orders for this visit:    Major depressive disorder, recurrent episode, moderate with anxious distress ([address]          INTERVENTION  Trained in relaxation strategies, Provided education, Established rapport, Supportive techniques and Identified maladaptive thoughts      PLAN  1) Complete a thought record exercise in session. 2) Taurus and [facility] will meet in two weeks for follow-up. INTERACTIVE COMPLEXITY  Is interactive complexity present?   No  Reason:  N/A  Additional Supporting Information:  N/A       Electronically signed by Shay Mcguire on 2/21/22 at 2:57 PM EST

## 2022-03-07 ENCOUNTER — OFFICE VISIT (OUTPATIENT)
Dept: BEHAVIORAL/MENTAL HEALTH CLINIC | Age: 32
End: 2022-03-07
Payer: MEDICARE

## 2022-03-07 DIAGNOSIS — F33.1 MAJOR DEPRESSIVE DISORDER, RECURRENT EPISODE, MODERATE WITH ANXIOUS DISTRESS (HCC): Primary | ICD-10-CM

## 2022-03-07 PROCEDURE — 1036F TOBACCO NON-USER: CPT | Performed by: PSYCHOLOGIST

## 2022-03-07 PROCEDURE — 90837 PSYTX W PT 60 MINUTES: CPT | Performed by: PSYCHOLOGIST

## 2022-03-07 NOTE — PROGRESS NOTES
Juliana Zacarias M.A. Psychology Doctoral Trainee    Supervising Clinical Psychologists:  Natalia Escalona, Ph.D. Han Aguila,  Ph.D. Jesika Jon Psy.D. Caitlyn Low        Visit Date: 3/7/2022   Time of appointment: 3:00-3:56PM  Time spent with Patient: 56 minutes. This is patient's fifth appointment. Reason for Consult:  Depression and Anxiety     Referring Provider/PCP:    No ref. provider found  EUSEBIO Ruth - CNP      Pt provided informed consent for the behavioral health program. Discussed with patient model of service to include the limits of confidentiality (i.e. abuse reporting, suicide intervention, etc.) and short-term intervention focused approach. Pt indicated understanding. Judy Galvez is a 28 y.o. male who presents for follow up of depression symptoms. The symptoms have been about the same but he reported increased ability to cope with symptoms. Taurus and [facility] were going to explore a thought record exercise around depression symptoms but he reported that he had not had any situations that contribute to those symptoms. Regarding realtional issues, he reported his ex will be dropping off his stuff and he reported limited emotional reactions to this. We explored in session his emotional numbness and the role of him feeling like burden may play in this. It was discerned that Taurus engages in behavior that often puts others first to his own detriment. At our follow-up session, [facility] and [facility] will explore What areas of your life can we start to put you first? What does this look like, I.e., what kinds of behaviors can we do the support that? Previous Recommendations:    1) Complete a thought record exercise in session. 2) [facility]aurus and [facility] will meet in two weeks for follow-up. MENTAL STATUS EXAM  Mood was within normal limits with calm affect. Suicidal ideation was denied.    Homicidal ideation was denied. Hygiene was good . Dress was appropriate. Behavior was Within Normal Limits with No observation or self-report of difficulties ambulating. Attitude was Cooperative. Eye-contact was good. Speech: rate - WNL, rhythm - WNL, volume - WNL. Verbalizations were goal directed. Thought processes were intact and goal-oriented without evidence of delusions, hallucinations, obsessions, or lucia; with no cognitive distortions. Associations were characterized by intact cognitive processes. Pt was oriented to person, place, time, and general circumstances;  recent:  good and remote:  good. Insight and judgment were estimated to be good, AEB, a good  understanding of cyclical maladaptive patterns, and the ability to use insight to inform behavior change. ASSESSMENT  Taurus Brock presented to the appointment today for evaluation and treatment of symptoms of depression and anxiety. He is currently deemed no risk to himself or others and meets criteria for major depressive disorder, recurrent episode, moderate with anxious distress. Taurus and [facility] use CBT-based techniques to treat depression symptoms and will continue to explore ways in which he can support his sense of self and increase confidence by putting himself first in everyday behavior. Taurus was in agreement with recommendations. PHQ Scores 3/9/2022 1/13/2022 6/16/2021 4/8/2021 12/18/2020 6/5/2019   PHQ2 Score 2 4 2 1 4 0   PHQ9 Score 2 10 2 1 11 0     Interpretation of Total Score Depression Severity: 1-4 = Minimal depression, 5-9 = Mild depression, 10-14 = Moderate depression, 15-19 = Moderately severe depression, 20-27 = Severe depression    How often pt has had thoughts of death or hurting self (if PHQ positive for depression):       No flowsheet data found. Interpretation of JESSICA-7 score: 5-9 = mild anxiety, 10-14 = moderate anxiety, 15+ = severe anxiety.  Recommend referral to behavioral health for scores 10 or greater. DIAGNOSIS  Taurus was seen today for depression and anxiety. Diagnoses and all orders for this visit:    Major depressive disorder, recurrent episode, moderate with anxious distress ([address]          INTERVENTION  Provided education, Established rapport, Supportive techniques and Identified maladaptive thoughts      PLAN  1) Explore these questions: What areas of your life can we start to put you first?   2) What does this look like, I.e., what kinds of behaviors can we do the support that? 3) Meet with [facility] in approximately 2-3 weeks for follow-up. INTERACTIVE COMPLEXITY  Is interactive complexity present?   No  Reason:  N/A  Additional Supporting Information:  N/A       Electronically signed by Mihir Raman on 3/7/22 at 2:56 PM EST

## 2022-03-09 ENCOUNTER — OFFICE VISIT (OUTPATIENT)
Dept: INTERNAL MEDICINE CLINIC | Age: 32
End: 2022-03-09
Payer: MEDICARE

## 2022-03-09 VITALS
OXYGEN SATURATION: 97 % | DIASTOLIC BLOOD PRESSURE: 70 MMHG | SYSTOLIC BLOOD PRESSURE: 118 MMHG | WEIGHT: 202 LBS | HEART RATE: 82 BPM | BODY MASS INDEX: 28.92 KG/M2 | HEIGHT: 70 IN | TEMPERATURE: 98.1 F

## 2022-03-09 DIAGNOSIS — F90.0 ATTENTION DEFICIT HYPERACTIVITY DISORDER (ADHD), PREDOMINANTLY INATTENTIVE TYPE: ICD-10-CM

## 2022-03-09 PROCEDURE — G8484 FLU IMMUNIZE NO ADMIN: HCPCS | Performed by: NURSE PRACTITIONER

## 2022-03-09 PROCEDURE — 99213 OFFICE O/P EST LOW 20 MIN: CPT | Performed by: NURSE PRACTITIONER

## 2022-03-09 PROCEDURE — G8427 DOCREV CUR MEDS BY ELIG CLIN: HCPCS | Performed by: NURSE PRACTITIONER

## 2022-03-09 PROCEDURE — G8417 CALC BMI ABV UP PARAM F/U: HCPCS | Performed by: NURSE PRACTITIONER

## 2022-03-09 PROCEDURE — 1036F TOBACCO NON-USER: CPT | Performed by: NURSE PRACTITIONER

## 2022-03-09 RX ORDER — DEXTROAMPHETAMINE SACCHARATE, AMPHETAMINE ASPARTATE MONOHYDRATE, DEXTROAMPHETAMINE SULFATE AND AMPHETAMINE SULFATE 5; 5; 5; 5 MG/1; MG/1; MG/1; MG/1
20 CAPSULE, EXTENDED RELEASE ORAL EVERY MORNING
Qty: 30 CAPSULE | Refills: 0 | Status: SHIPPED | OUTPATIENT
Start: 2022-03-09 | End: 2022-04-12 | Stop reason: SDUPTHER

## 2022-03-09 ASSESSMENT — ENCOUNTER SYMPTOMS
NAUSEA: 0
SINUS PAIN: 0
SHORTNESS OF BREATH: 0
CONSTIPATION: 0
RHINORRHEA: 0
BLOOD IN STOOL: 0
TROUBLE SWALLOWING: 0
ABDOMINAL PAIN: 0
DIARRHEA: 0
COUGH: 0
VOMITING: 0
CHEST TIGHTNESS: 0
SORE THROAT: 0
WHEEZING: 0

## 2022-03-09 ASSESSMENT — PATIENT HEALTH QUESTIONNAIRE - PHQ9
2. FEELING DOWN, DEPRESSED OR HOPELESS: 1
SUM OF ALL RESPONSES TO PHQ QUESTIONS 1-9: 2
SUM OF ALL RESPONSES TO PHQ QUESTIONS 1-9: 2
1. LITTLE INTEREST OR PLEASURE IN DOING THINGS: 1
SUM OF ALL RESPONSES TO PHQ QUESTIONS 1-9: 2
SUM OF ALL RESPONSES TO PHQ QUESTIONS 1-9: 2
SUM OF ALL RESPONSES TO PHQ9 QUESTIONS 1 & 2: 2

## 2022-03-09 NOTE — PROGRESS NOTES
Visit Information    Have you changed or started any medications since your last visit including any over-the-counter medicines, vitamins, or herbal medicines? no   Have you stopped taking any of your medications? Is so, why? -  no  Are you having any side effects from any of your medications? - no    Have you seen any other physician or provider since your last visit?  no   Have you had any other diagnostic tests since your last visit?  no   Have you been seen in the emergency room and/or had an admission in a hospital since we last saw you?  no   Have you had your routine dental cleaning in the past 6 months?  no     Do you have an active MyChart account? If no, what is the barrier?   Yes    Patient Care Team:  EUSEBIO Mishra CNP as PCP - General (Nurse Practitioner)  EUSEBIO Mishra CNP as PCP - [facility] Provider  EUSEBIO Smith - LINDA (Nurse Practitioner)    Medical History Review  Past Medical, Family, and Social History reviewed and does contribute to the patient presenting condition    Health Maintenance   Topic Date Due    Hepatitis C screen  Never done    Pneumococcal 0-64 years Vaccine (1 of 2 - PPSV23) Never done    HIV screen  Never done    DTaP/Tdap/Td vaccine (7 - Td or Tdap) 09/03/2014    COVID-19 Vaccine (2 - Booster for Mata series) 06/04/2021    Flu vaccine (1) Never done    Depression Monitoring  01/13/2023    Hepatitis B vaccine  Completed    Hib vaccine  Completed    Varicella vaccine  Completed    Hepatitis A vaccine  Aged Out    Meningococcal (ACWY) vaccine  Aged Out           [address] [address]  Dept: [phone]  Dept Fax: [phone]    OfficeProgress/Follow Up Note  Date of patient's visit: 3/9/2022  Patient's Name:  Karo Case YOB: 1990            Patient Care Team:  EUSEBIO Mishra CNP as PCP - General (Nurse Practitioner)  EUSEBIO Mishra CNP ALLERGIES:    No Known Allergies      SOCIAL HISTORY    Reviewed and no change from previous record. Taurus  reports that he has never smoked. He has never used smokeless tobacco.    FAMILY HISTORY:    Reviewed and No change from previous visit    REVIEW OF SYSTEMS:    Review of Systems   Constitutional: Negative for appetite change, diaphoresis, fatigue, fever and unexpected weight change. HENT: Negative for ear pain, postnasal drip, rhinorrhea, sinus pain, sore throat and trouble swallowing. Eyes: Negative for visual disturbance. Respiratory: Negative for cough, chest tightness, shortness of breath and wheezing. Cardiovascular: Negative for chest pain, palpitations and leg swelling. Gastrointestinal: Negative for abdominal pain, blood in stool, constipation, diarrhea, nausea and vomiting. Endocrine: Negative for polydipsia, polyphagia and polyuria. Genitourinary: Negative for difficulty urinating, dysuria and flank pain. Musculoskeletal: Negative for arthralgias and myalgias. Skin: Negative for rash and wound. Neurological: Negative for dizziness, syncope and weakness. Psychiatric/Behavioral: Negative for decreased concentration (reports well controlled with medication), dysphoric mood and suicidal ideas. The patient is not nervous/anxious. All other systems reviewed and are negative. PHYSICAL EXAM:      Vitals:    03/09/22 0907   BP: 118/70   Site: Left Upper Arm   Position: Sitting   Cuff Size: Large Adult   Pulse: 82   Temp: 98.1 °F (36.7 °C)   TempSrc: Temporal   SpO2: 97%   Weight: 202 lb (91.6 kg)   Height: 5' 10\" (1.778 m)     BP Readings from Last 3 Encounters:   03/09/22 118/70   11/16/21 134/82   11/02/21 136/82      Wt Readings from Last 3 Encounters:   03/09/22 202 lb (91.6 kg)   11/16/21 176 lb (79.8 kg)   11/02/21 197 lb (89.4 kg)       Physical Exam  Vitals reviewed. Constitutional:       Appearance: Normal appearance. HENT:      Head: Normocephalic. Cardiovascular:      Rate and Rhythm: Normal rate and regular rhythm. Pulses: Normal pulses. Heart sounds: Normal heart sounds. Pulmonary:      Effort: Pulmonary effort is normal.      Breath sounds: Normal breath sounds. Musculoskeletal:         General: No swelling, tenderness or deformity. Normal range of motion. Skin:     General: Skin is warm and dry. Neurological:      General: No focal deficit present. Mental Status: He is alert and oriented to person, place, and time. Psychiatric:         Mood and Affect: Mood normal.         Behavior: Behavior normal.         Thought Content: Thought content normal.         Judgment: Judgment normal.          LABORATORY FINDINGS:    CBC:  Lab Results   Component Value Date    WBC 6.5 05/18/2021    HGB 14.2 05/18/2021     05/18/2021       BMP:    Lab Results   Component Value Date     05/18/2021    K 4.7 05/18/2021     05/18/2021    CO2 24 05/18/2021    BUN 8 05/18/2021    CREATININE 0.80 05/18/2021    GLUCOSE 88 05/18/2021    GLUCOSE 90 12/05/2019       HEMOGLOBIN A1C: No results found for: LABA1C    FASTING LIPID PANEL:  Lab Results   Component Value Date    CHOL 161 12/05/2019    HDL 42 12/05/2019    TRIG 75 12/05/2019       ASSESSMENT AND PLAN:      1. Attention deficit hyperactivity disorder (ADHD), predominantly inattentive type  - stable, well controlled with current medication  - amphetamine-dextroamphetamine (ADDERALL XR) 20 MG extended release capsule; Take 1 capsule by mouth every morning for 30 days. Dispense: 30 capsule; Refill: 0      FOLLOW UP AND INSTRUCTIONS:   Return in about 3 months (around 6/9/2022). Taurus received counseling on the following healthy behaviors: nutrition, exercise and medication adherence    Discussed use, benefit, and side effects of prescribed medications. Barriers to medication compliance addressed. All patient questions answered. Patient voiced understanding.      Patient given educational materials - see patient instructions    EUSEBIO Padgett - [facility] LU[facility]  3/9/2022, 9:36 AM    Please note that this chart was generated using voice recognition Dragon dictation software. Although everyeffort was made to ensure the accuracy of this automated transcription, some errors in transcription may have occurred.

## 2022-03-28 ENCOUNTER — OFFICE VISIT (OUTPATIENT)
Dept: BEHAVIORAL/MENTAL HEALTH CLINIC | Age: 32
End: 2022-03-28
Payer: MEDICARE

## 2022-03-28 DIAGNOSIS — F33.1 MAJOR DEPRESSIVE DISORDER, RECURRENT EPISODE, MODERATE WITH ANXIOUS DISTRESS (HCC): Primary | ICD-10-CM

## 2022-03-28 PROCEDURE — 1036F TOBACCO NON-USER: CPT | Performed by: PSYCHOLOGIST

## 2022-03-28 PROCEDURE — 90837 PSYTX W PT 60 MINUTES: CPT | Performed by: PSYCHOLOGIST

## 2022-03-28 NOTE — PROGRESS NOTES
Stanton Chanel M.A. Psychology Doctoral Trainee    Supervising Clinical Psychologists:  Anthony Victor, Ph.D. Luz Bella,  Ph.D. Lester Monzon Psy.D. Caitlyn Low        Visit Date: 3/28/2022   Time of appointment: 3:00-3:56PM  Time spent with Patient: 56 minutes. This is patient's sixth appointment. Reason for Consult:  Depression and Anxiety     Referring Provider/PCP:    No ref. provider found  EUSEBIO Santiago - CNP      Pt provided informed consent for the behavioral health program. Discussed with patient model of service to include the limits of confidentiality (i.e. abuse reporting, suicide intervention, etc.) and short-term intervention focused approach. Pt indicated understanding. Rose Hardin is a 28 y.o. male who presents for follow up of depression symptoms. He reported he started talking to a new person at a [employer] tournament. He reported this relationship has been useful for him to see how poor quality many of his past relationships have been. In session, we focused on depression symptoms via a thought record exercise. We also explored recent life difficulties that contribute to poor self-esteem. Aldo Fernandez reported she has observed that he does not often have time for himself, adding he will say yes to everyone's invites and requests. We discussed how he can say no more in the service of his own needs and why this is difficult for him. We answered questions from the previous recommendations in session, and Taurus will start to work on not saying yes to every friend. He will start with not saying yes to friends he is less close with to help him manage any potential negative social consequences. He will then use this time to do something he enjoys or to do something he cares about to show that his time is important and he can protect his time to serve his own needs.     Previous Recommendations:     1) Explore these questions: What areas of your life can we start to put you first?   2) What does this look like, I.e., what kinds of behaviors can we do the support that? 3) Meet with [facility] in approximately 2-3 weeks for follow-up.           MENTAL STATUS EXAM  Mood was within normal limits with calm affect. Suicidal ideation was denied. Homicidal ideation was denied. Hygiene was good . Dress was appropriate. Behavior was Within Normal Limits with No observation or self-report of difficulties ambulating. Attitude was Cooperative. Eye-contact was good. Speech: rate - WNL, rhythm - WNL, volume - WNL. Verbalizations were goal directed. Thought processes were intact and goal-oriented without evidence of delusions, hallucinations, obsessions, or lucia; with no cognitive distortions. Associations were characterized by intact cognitive processes. Pt was oriented to person, place, time, and general circumstances;  recent:  good and remote:  good. Insight and judgment were estimated to be good, AEB, a good  understanding of cyclical maladaptive patterns, and the ability to use insight to inform behavior change. ASSESSMENT  Taurus York presented to the appointment today for evaluation and treatment of symptoms of depression. He is currently deemed no risk to himself or others and meets criteria for major depressive disorder, moderate, recurrent episode with anxious distress. In session, we explored depression symptoms and the importance of not stretching himself too thin (I.e., not over-committing to social activities). Jinny Robbins will practice saying no and using his time to service his own needs. Taurus was in agreement with recommendations.       PHQ Scores 3/9/2022 1/13/2022 6/16/2021 4/8/2021 12/18/2020 6/5/2019   PHQ2 Score 2 4 2 1 4 0   PHQ9 Score 2 10 2 1 11 0     Interpretation of Total Score Depression Severity: 1-4 = Minimal depression, 5-9 = Mild depression, 10-14 = Moderate depression, 15-19 = Moderately severe depression, 20-27 = Severe depression    How often pt has had thoughts of death or hurting self (if PHQ positive for depression):       No flowsheet data found. Interpretation of JESSICA-7 score: 5-9 = mild anxiety, 10-14 = moderate anxiety, 15+ = severe anxiety. Recommend referral to behavioral health for scores 10 or greater. DIAGNOSIS  Taurus was seen today for depression and anxiety. Diagnoses and all orders for this visit:    Major depressive disorder, recurrent episode, moderate with anxious distress ([facility] [address]          INTERVENTION  Provided education, Established rapport, Supportive techniques and Identified maladaptive thoughts      PLAN  1) Michelle Macias will practice saying no to some friends. 2) Michelle Macias will meet with [facility] in approximately 2-3 weeks for follow-up. INTERACTIVE COMPLEXITY  Is interactive complexity present?   No  Reason:  N/A  Additional Supporting Information:  N/A       Electronically signed by Senait Harry on 3/28/22 at 2:48 PM EDT

## 2022-03-28 NOTE — PATIENT INSTRUCTIONS
1) At least once between now and next week, say 'no' to one friend hangout. Make sure you have something planned to do to spend your time. 2) Next time, explore saying no face-to-face.

## 2022-04-12 DIAGNOSIS — F90.0 ATTENTION DEFICIT HYPERACTIVITY DISORDER (ADHD), PREDOMINANTLY INATTENTIVE TYPE: ICD-10-CM

## 2022-04-12 RX ORDER — DEXTROAMPHETAMINE SACCHARATE, AMPHETAMINE ASPARTATE MONOHYDRATE, DEXTROAMPHETAMINE SULFATE AND AMPHETAMINE SULFATE 5; 5; 5; 5 MG/1; MG/1; MG/1; MG/1
20 CAPSULE, EXTENDED RELEASE ORAL EVERY MORNING
Qty: 30 CAPSULE | Refills: 0 | Status: SHIPPED | OUTPATIENT
Start: 2022-04-12 | End: 2022-05-11 | Stop reason: SDUPTHER

## 2022-04-18 ENCOUNTER — OFFICE VISIT (OUTPATIENT)
Dept: BEHAVIORAL/MENTAL HEALTH CLINIC | Age: 32
End: 2022-04-18
Payer: MEDICARE

## 2022-04-18 DIAGNOSIS — F33.1 MAJOR DEPRESSIVE DISORDER, RECURRENT EPISODE, MODERATE WITH ANXIOUS DISTRESS (HCC): Primary | ICD-10-CM

## 2022-04-18 PROCEDURE — 1036F TOBACCO NON-USER: CPT | Performed by: PSYCHOLOGIST

## 2022-04-18 PROCEDURE — 90832 PSYTX W PT 30 MINUTES: CPT | Performed by: PSYCHOLOGIST

## 2022-04-18 NOTE — PROGRESS NOTES
Alvino Ramírez M.A. Psychology Doctoral Trainee    Supervising Clinical Psychologists:  Mars Andrade, Ph.D. Kathleen Milan,  Ph.D. Edwar Townsend Psy.D. Caitlyn Low        Visit Date: 4/18/2022   Time of appointment: 4:00-4:34PM  Time spent with Patient: 34 minutes. This is patient's seventh appointment. Reason for Consult:  Depression and Anxiety     Referring Provider/PCP:    No ref. provider found  EUSEBIO Marshall - CNP    Pt provided informed consent for the behavioral health program. Discussed with patient model of service to include the limits of confidentiality (i.e. abuse reporting, suicide intervention, etc.) and short-term intervention focused approach. Pt indicated understanding. Severiano Arnt is a 28 y.o. male who presents for follow up of depression symptoms. Taurus reported some success saying no to friends, adding he was surprised they were not mad at him for standing his ground. Taurus reported he is learning more about his power and why this is relevant to his mental health. Taurus and [facility] discussed things he wants to work on moving forward: 1) working on depression symptoms, learning to not conceal and tools to deal with those symptoms; 2) working through past trauma in relation to mental health difficulties; 3) what does balance look like in his life?; 4) and continuing to work on coping skills in relation to depression symptoms. Taurus reported he is not sure where he'd like to go moving forward, and the [facility] offered that he think about it over the next week and come back in with any final questions to discuss. [facility] and Marya Aviles will meet in one week to finalize referral plans. Previous Recommendations:    1) Juli Lagunas will practice saying no to some friends. 2) Juli Lagunas will meet with [facility] in approximately 2-3 weeks for follow-up.         MENTAL STATUS EXAM  Mood was within normal limits with calm affect. Suicidal ideation was denied. Homicidal ideation was denied. Hygiene was good . Dress was appropriate. Behavior was Within Normal Limits with No observation or self-report of difficulties ambulating. Attitude was Cooperative. Eye-contact was good. Speech: rate - WNL, rhythm - WNL, volume - WNL. Verbalizations were goal directed. Thought processes were intact and goal-oriented without evidence of delusions, hallucinations, obsessions, or lucia; with no cognitive distortions. Associations were characterized by intact cognitive processes. Pt was oriented to person, place, time, and general circumstances;  recent:  good and remote:  good. Insight and judgment were estimated to be good, AEB, a good  understanding of cyclical maladaptive patterns, and the ability to use insight to inform behavior change. ASSESSMENT  Taurus Hoffman presented to the appointment today for evaluation and treatment of symptoms of depression and anxiety. He is currently deemed no risk to himself or others and meets criteria for major depressive disorder, moderate, recurrent episode with anxious distress. Taurus discussed next steps including referral; he and the [facility] [facility] will meet in one week to finalize plans. Taurus was in agreement with recommendations. PHQ Scores 3/9/2022 1/13/2022 6/16/2021 4/8/2021 12/18/2020 6/5/2019   PHQ2 Score 2 4 2 1 4 0   PHQ9 Score 2 10 2 1 11 0     Interpretation of Total Score Depression Severity: 1-4 = Minimal depression, 5-9 = Mild depression, 10-14 = Moderate depression, 15-19 = Moderately severe depression, 20-27 = Severe depression    How often pt has had thoughts of death or hurting self (if PHQ positive for depression):       No flowsheet data found. Interpretation of JESSICA-7 score: 5-9 = mild anxiety, 10-14 = moderate anxiety, 15+ = severe anxiety. Recommend referral to behavioral health for scores 10 or greater.       DIAGNOSIS  Taurus was seen today for depression and anxiety. Diagnoses and all orders for this visit:    Major depressive disorder, recurrent episode, moderate with anxious distress ([address]          INTERVENTION  Provided education, Established rapport and Supportive techniques      PLAN  1) Meet with [facility] in one week to finalize referral plan. INTERACTIVE COMPLEXITY  Is interactive complexity present?   No  Reason:  N/A  Additional Supporting Information:  N/A       Electronically signed by Kady Burdick on 4/18/22 at 3:58 PM EDT

## 2022-04-25 ENCOUNTER — OFFICE VISIT (OUTPATIENT)
Dept: BEHAVIORAL/MENTAL HEALTH CLINIC | Age: 32
End: 2022-04-25
Payer: MEDICARE

## 2022-04-25 DIAGNOSIS — F33.1 MAJOR DEPRESSIVE DISORDER, RECURRENT EPISODE, MODERATE WITH ANXIOUS DISTRESS (HCC): Primary | ICD-10-CM

## 2022-04-25 PROCEDURE — 90832 PSYTX W PT 30 MINUTES: CPT | Performed by: PSYCHOLOGIST

## 2022-04-25 PROCEDURE — 1036F TOBACCO NON-USER: CPT | Performed by: PSYCHOLOGIST

## 2022-04-25 NOTE — PROGRESS NOTES
Bobbi Rolon M.A. Psychology Doctoral Trainee    Supervising Clinical Psychologists:  Hadassah Cabot, Ph.D. Ramon Luz,  Ph.D. Chris Ndiaye Psy.D. Caitlyn Low        Visit Date: 4/25/2022   Time of appointment: 4:00-4:21PM  Time spent with Patient: 21 minutes. This is patient's eighth appointment. Reason for Consult:  Depression and Anxiety     Referring Provider/PCP:    No ref. provider found  EUSEBIO Roman - CNP      Pt provided informed consent for the behavioral health program. Discussed with patient model of service to include the limits of confidentiality (i.e. abuse reporting, suicide intervention, etc.) and short-term intervention focused approach. Pt indicated understanding. Viviane Dance is a 28 y.o. male who presents for follow up of depression and anxiety. Taurus and [facility] discussed referral. Chrissy Jefferson reported he wanted to work with more long-term solutions. He was given a list of providers and him and the [facility] discusses pros and cons of each provider. Taurus reported he will make some calls and get connected with a long-term mental health care solution. Previous Recommendations:  1) Meet with [facility] in 1 week to discuss referrals. MENTAL STATUS EXAM  Mood was within normal limits with calm affect. Suicidal ideation was denied. Homicidal ideation was denied. Hygiene was good . Dress was appropriate. Behavior was Within Normal Limits with No observation or self-report of difficulties ambulating. Attitude was Cooperative. Eye-contact was good. Speech: rate - WNL, rhythm - WNL, volume - WNL. Verbalizations were goal directed. Thought processes were intact and goal-oriented without evidence of delusions, hallucinations, obsessions, or lucia; with no cognitive distortions. Associations were characterized by intact cognitive processes.   Pt was oriented to person, place, time, and general circumstances;  recent:  good and remote:  good. Insight and judgment were estimated to be good, AEB, a good  understanding of cyclical maladaptive patterns, and the ability to use insight to inform behavior change. ASSESSMENT  Taurus Espinal presented to the appointment today for evaluation and treatment of symptoms of depression and anxiety. He is currently deemed no risk to himself or others and meets criteria for major depressive disorder, moderate, recurrent episode with anxious distress. Taurus reported he wanted to be connected with long-term solutions and he was given a list of mental health providers in the area. Taurus was in agreement with recommendations. PHQ Scores 3/9/2022 1/13/2022 6/16/2021 4/8/2021 12/18/2020 6/5/2019   PHQ2 Score 2 4 2 1 4 0   PHQ9 Score 2 10 2 1 11 0     Interpretation of Total Score Depression Severity: 1-4 = Minimal depression, 5-9 = Mild depression, 10-14 = Moderate depression, 15-19 = Moderately severe depression, 20-27 = Severe depression    How often pt has had thoughts of death or hurting self (if PHQ positive for depression):       No flowsheet data found. Interpretation of JESSICA-7 score: 5-9 = mild anxiety, 10-14 = moderate anxiety, 15+ = severe anxiety. Recommend referral to behavioral health for scores 10 or greater. DIAGNOSIS  Taurus was seen today for depression and anxiety. Diagnoses and all orders for this visit:    Major depressive disorder, recurrent episode, moderate with anxious distress ([facility] [address]          INTERVENTION  Provided education, Established rapport and Supportive techniques      PLAN  1) None - pt was referred to external providers for long-term mental health services. INTERACTIVE COMPLEXITY  Is interactive complexity present?   No  Reason:  N/A  Additional Supporting Information:  N/A       Electronically signed by Enmanuel Burns on 4/25/22 at 4:01 PM EDT

## 2022-05-11 DIAGNOSIS — F90.0 ATTENTION DEFICIT HYPERACTIVITY DISORDER (ADHD), PREDOMINANTLY INATTENTIVE TYPE: ICD-10-CM

## 2022-05-11 RX ORDER — DEXTROAMPHETAMINE SACCHARATE, AMPHETAMINE ASPARTATE MONOHYDRATE, DEXTROAMPHETAMINE SULFATE AND AMPHETAMINE SULFATE 5; 5; 5; 5 MG/1; MG/1; MG/1; MG/1
20 CAPSULE, EXTENDED RELEASE ORAL EVERY MORNING
Qty: 30 CAPSULE | Refills: 0 | Status: SHIPPED | OUTPATIENT
Start: 2022-05-11 | End: 2022-06-02 | Stop reason: SDUPTHER

## 2022-06-02 ENCOUNTER — OFFICE VISIT (OUTPATIENT)
Dept: INTERNAL MEDICINE CLINIC | Age: 32
End: 2022-06-02
Payer: MEDICARE

## 2022-06-02 VITALS
SYSTOLIC BLOOD PRESSURE: 132 MMHG | OXYGEN SATURATION: 98 % | WEIGHT: 198.2 LBS | DIASTOLIC BLOOD PRESSURE: 82 MMHG | HEART RATE: 69 BPM | HEIGHT: 70 IN | BODY MASS INDEX: 28.37 KG/M2

## 2022-06-02 DIAGNOSIS — F12.180 CANNABIS ABUSE WITH CANNABIS-INDUCED ANXIETY DISORDER (HCC): ICD-10-CM

## 2022-06-02 DIAGNOSIS — E55.9 VITAMIN D DEFICIENCY: ICD-10-CM

## 2022-06-02 DIAGNOSIS — Z11.59 NEED FOR HEPATITIS C SCREENING TEST: ICD-10-CM

## 2022-06-02 DIAGNOSIS — M25.561 CHRONIC PAIN OF RIGHT KNEE: ICD-10-CM

## 2022-06-02 DIAGNOSIS — F90.0 ATTENTION DEFICIT HYPERACTIVITY DISORDER (ADHD), PREDOMINANTLY INATTENTIVE TYPE: Primary | ICD-10-CM

## 2022-06-02 DIAGNOSIS — G89.29 CHRONIC PAIN OF RIGHT KNEE: ICD-10-CM

## 2022-06-02 DIAGNOSIS — F33.0 MILD EPISODE OF RECURRENT MAJOR DEPRESSIVE DISORDER (HCC): ICD-10-CM

## 2022-06-02 DIAGNOSIS — Z11.4 ENCOUNTER FOR SCREENING FOR HIV: ICD-10-CM

## 2022-06-02 DIAGNOSIS — R07.89 INTERMITTENT LEFT-SIDED CHEST PAIN: ICD-10-CM

## 2022-06-02 DIAGNOSIS — Z13.220 LIPID SCREENING: ICD-10-CM

## 2022-06-02 PROBLEM — F10.10 ALCOHOL CONSUMPTION BINGE DRINKING: Status: RESOLVED | Noted: 2021-07-15 | Resolved: 2022-06-02

## 2022-06-02 PROCEDURE — G8427 DOCREV CUR MEDS BY ELIG CLIN: HCPCS | Performed by: NURSE PRACTITIONER

## 2022-06-02 PROCEDURE — 1036F TOBACCO NON-USER: CPT | Performed by: NURSE PRACTITIONER

## 2022-06-02 PROCEDURE — 99214 OFFICE O/P EST MOD 30 MIN: CPT | Performed by: NURSE PRACTITIONER

## 2022-06-02 PROCEDURE — G8417 CALC BMI ABV UP PARAM F/U: HCPCS | Performed by: NURSE PRACTITIONER

## 2022-06-02 RX ORDER — DEXTROAMPHETAMINE SACCHARATE, AMPHETAMINE ASPARTATE MONOHYDRATE, DEXTROAMPHETAMINE SULFATE AND AMPHETAMINE SULFATE 5; 5; 5; 5 MG/1; MG/1; MG/1; MG/1
20 CAPSULE, EXTENDED RELEASE ORAL EVERY MORNING
Qty: 30 CAPSULE | Refills: 0 | Status: SHIPPED | OUTPATIENT
Start: 2022-06-02 | End: 2022-07-05 | Stop reason: SDUPTHER

## 2022-06-02 RX ORDER — MELOXICAM 7.5 MG/1
7.5 TABLET ORAL DAILY PRN
Qty: 30 TABLET | Refills: 2 | Status: SHIPPED | OUTPATIENT
Start: 2022-06-02 | End: 2022-09-06

## 2022-06-02 NOTE — PROGRESS NOTES
Visit Information    Have you changed or started any medications since your last visit including any over-the-counter medicines, vitamins, or herbal medicines? no   Are you having any side effects from any of your medications? -  no  Have you stopped taking any of your medications? Is so, why? -  no    Have you seen any other physician or provider since your last visit? No  Have you had any other diagnostic tests since your last visit? No  Have you been seen in the emergency room and/or had an admission to a hospital since we last saw you? No  Have you had your routine dental cleaning in the past 6 months? no    Have you activated your [employer] account? If not, what are your barriers? Yes     Patient Care Team:  EUSEBIO Conner NP (Nurse Practitioner)    Medical History Review  Past Medical, Family, and Social History reviewed and does contribute to the patient presenting condition    Health Maintenance   Topic Date Due    Pneumococcal 0-64 years Vaccine (1 - PCV) Never done    HIV screen  Never done    Hepatitis C screen  Never done    DTaP/Tdap/Td vaccine (7 - Td or Tdap) 09/03/2014    COVID-19 Vaccine (2 - Booster for [employer] series) 06/04/2021    Flu vaccine (Season Ended) 09/01/2022    Depression Monitoring  03/09/2023    Hepatitis B vaccine  Completed    Hib vaccine  Completed    Varicella vaccine  Completed    Hepatitis A vaccine  Aged Out    Meningococcal (ACWY) vaccine  Aged Out         [address]  [place] [address]  Dept: [phone]  Dept Fax: [phone]    Office Progress/Follow Up Note  Date of patient's visit: 6/2/2022   Patient's Name:  Robson Izaguirre  YOB: 1990            Patient Care Team:  EUSEBIO Conner NP (Nurse Practitioner)    REASON FOR VISIT: ADHD, Anxiety, and Depression        HISTORY OF PRESENT ILLNESS:      History was obtained from the patient.  Robson Izaguirre is a 28 y.o. is here for multiple medical problems including ADHD, Anxiety, and Depression    HPI  ADD - diagnosed in 2nd grade, was on ritalin. Has been on Adderall since freshman year of college. Same dose. Was following with [facility] but hasn't re-established with anyone since provider left suddenly. Has anxiety and depression, not currently on medication, has been on celexa in the past.    Patient Active Problem List   Diagnosis    High risk medication use    ADD (attention deficit disorder)    Vitamin D deficiency    Chronic fatigue    Mild episode of recurrent major depressive disorder ([facility] [address]    BMI 27.0-27.9,adult    Nephrolithiasis    Epididymitis, left    Anxiety    Major depressive disorder, recurrent episode with anxious distress ([facility] [address]    Cannabis abuse with cannabis-induced anxiety disorder ([facility] [address]       No Known Allergies      MEDICATIONS:     Current Outpatient Medications   Medication Sig Dispense Refill    meloxicam (MOBIC) 7.5 MG tablet Take 1 tablet by mouth daily as needed for Pain 30 tablet 2    amphetamine-dextroamphetamine (ADDERALL XR) 20 MG extended release capsule Take 1 capsule by mouth every morning for 30 days. 30 capsule 0    acetaminophen (TYLENOL) 650 MG extended release tablet Take 650 mg by mouth every 8 hours as needed       No current facility-administered medications for this visit. SOCIAL HISTORY    Reviewed and updated. Taurus  reports that he has never smoked. He has never used smokeless tobacco.    FAMILY HISTORY:    Reviewed and updated. family history includes Breast Cancer in his maternal grandmother; Hypertension in his mother; Stroke in his maternal grandmother. REVIEW OF SYSTEMS:      Review of Systems   Constitutional: Positive for fatigue. Negative for chills and fever. HENT: Negative for congestion, ear pain, sore throat and trouble swallowing. Eyes: Negative for discharge and visual disturbance.    Respiratory: Negative for cough, shortness of breath and wheezing. Cardiovascular: Positive for chest pain (left sided, intermittent, lasts for a few minutes, not sure if associated with anxiety or exertion). Negative for palpitations and leg swelling. Gastrointestinal: Negative for abdominal pain, constipation, diarrhea and nausea. Genitourinary: Negative for dysuria, frequency and hematuria. Musculoskeletal: Positive for arthralgias. Negative for gait problem. Right knee, using mobic as needed   Neurological: Negative for dizziness and headaches. Psychiatric/Behavioral: Positive for decreased concentration. Negative for self-injury, sleep disturbance and suicidal ideas. The patient is nervous/anxious and is hyperactive. PHYSICAL EXAM:      Vitals:    06/02/22 1641   BP: 132/82   Pulse: 69   SpO2: 98%   Weight: 198 lb 3.2 oz (89.9 kg)   Height: 5' 10\" (1.778 m)     BP Readings from Last 3 Encounters:   06/02/22 132/82   03/09/22 118/70   11/16/21 134/82        Physical Exam  Constitutional:       General: He is not in acute distress. Appearance: Normal appearance. He is well-developed. HENT:      Head: Normocephalic and atraumatic. Right Ear: External ear normal.      Left Ear: External ear normal.      Nose: Nose normal.   Eyes:      General:         Right eye: No discharge. Left eye: No discharge. Conjunctiva/sclera: Conjunctivae normal.      Pupils: Pupils are equal, round, and reactive to light. Neck:      Thyroid: No thyromegaly. Cardiovascular:      Rate and Rhythm: Normal rate and regular rhythm. Pulses: Normal pulses. Heart sounds: Normal heart sounds. No murmur heard. Pulmonary:      Effort: Pulmonary effort is normal.      Breath sounds: Normal breath sounds. No wheezing. Abdominal:      General: Bowel sounds are normal. There is no distension. Palpations: Abdomen is soft. Tenderness: There is no abdominal tenderness.    Musculoskeletal:      Cervical back: Normal range of motion and neck supple. No tenderness. Thoracic back: No tenderness. Normal range of motion. Lumbar back: No tenderness. Normal range of motion. Right knee: No swelling or deformity. Normal range of motion. No tenderness. Left knee: Normal.      Right lower leg: No edema. Left lower leg: No edema. Skin:     General: Skin is warm and dry. Neurological:      Mental Status: He is alert and oriented to person, place, and time. Gait: Gait normal.   Psychiatric:         Mood and Affect: Mood normal.         Behavior: Behavior normal.          LABORATORY FINDINGS:    CBC:   Lab Results   Component Value Date    WBC 6.5 05/18/2021    HGB 14.2 05/18/2021     05/18/2021     BMP:   Lab Results   Component Value Date     05/18/2021    K 4.7 05/18/2021     05/18/2021    CO2 24 05/18/2021    BUN 8 05/18/2021    CREATININE 0.80 05/18/2021    GLUCOSE 88 05/18/2021    GLUCOSE 90 12/05/2019     HEMOGLOBIN A1C: No results found for: LABA1C  FASTING LIPID PANEL:   Lab Results   Component Value Date    CHOL 161 12/05/2019    HDL 42 12/05/2019    LDLCHOLESTEROL 99 02/27/2016    TRIG 75 12/05/2019       ASSESSMENT AND PLAN:      Visit Diagnoses and Associated Orders     Attention deficit hyperactivity disorder (ADHD), predominantly inattentive type    -  Primary    Controlled, continue Adderall and will refer to psych. List of providers to be given to patient    TSH with Reflex [[id number] Custom]   - Future Order    amphetamine-dextroamphetamine (ADDERALL XR) 20 MG extended release capsule [[id number]]           Cannabis abuse with cannabis-induced anxiety disorder [facility])        Patient has cut back          Mild episode of recurrent major depressive disorder ([facility] [address]        Stable, not on medication.      CBC with Auto Differential [id number] Custom]   - Future Order    Comprehensive Metabolic Panel [[id number] Custom]   - Future Order         Vitamin D deficiency        Vitamin D 25 Hydroxy [[id number] Custom]   - Future Order         Need for hepatitis C screening test        Hepatitis C Antibody [[id number] Custom]   - Future Order         Encounter for screening for HIV        HIV Screen [[id number] Custom]   - Future Order         Lipid screening        Lipid Panel [[id number] Custom]   - Future Order         Chronic pain of right knee        Unchanged, continue mobic prn    meloxicam (MOBIC) 7.5 MG tablet [[id number]]           Intermittent left-sided chest pain        CARDIAC STRESS TEST EXERCISE ONLY [[id number] Custom]   - Future Order                FOLLOW UP AND INSTRUCTIONS:     Return in about 3 months (around 9/2/2022) for chronic conditions. · Taurus received counseling on the following healthy behaviors: nutrition, exercise and medication adherence    · Discussed use, benefit, and side effects of prescribed medications. Barriers to medication compliance addressed. All patient questions answered. Pt voiced understanding. EUSEBIO Brown - CNP    6/8/2022, 12:01 PM    Please note that this chart was generated using voice recognition Dragon dictation software. Although every effort was made to ensure the accuracy of this automatedtranscription, some errors in transcription may have occurred.

## 2022-06-08 PROBLEM — F33.1 MODERATE RECURRENT MAJOR DEPRESSION (HCC): Status: RESOLVED | Noted: 2020-01-15 | Resolved: 2022-06-08

## 2022-06-08 ASSESSMENT — ENCOUNTER SYMPTOMS
NAUSEA: 0
WHEEZING: 0
SHORTNESS OF BREATH: 0
CONSTIPATION: 0
TROUBLE SWALLOWING: 0
COUGH: 0
DIARRHEA: 0
SORE THROAT: 0
EYE DISCHARGE: 0
ABDOMINAL PAIN: 0

## 2022-07-01 NOTE — TELEPHONE ENCOUNTER
Medication Requested:  Adderall  Last visit: 12/18/20  Next visit: 4/1/2021  Last refill: 02/17/21    Med contract on file:  [x] yes   [] no    Last urine drug screen: 10/15/19  Consistent with medication(s):    [] yes   [] no    Last OARRS ran: 11/18/20    Quantity of medication remaining:     Who will be picking rx up:     Pharmacy if escribed: [facility] You will need to remain in home quarantine until all three of the following are true:  You are 5 days from symptom onset,   your symptoms are improving   you have been fever free for at least  24 hours without taking any Tylenol or ibuprofen.  4.   you will have to wear a mask when around anyone else for 10 days from the onset of symptoms.  If you develop significant shortness of breath, meaning that it is difficult for you to walk even short distances without having to stop and catch your breath, or you become severely dizzy and this is persistent then please return to the emergency department.    I recommend you get a home pulse oximeter.  Return if your oxygenation is less than 90.

## 2022-07-05 DIAGNOSIS — F90.0 ATTENTION DEFICIT HYPERACTIVITY DISORDER (ADHD), PREDOMINANTLY INATTENTIVE TYPE: ICD-10-CM

## 2022-07-07 RX ORDER — DEXTROAMPHETAMINE SACCHARATE, AMPHETAMINE ASPARTATE MONOHYDRATE, DEXTROAMPHETAMINE SULFATE AND AMPHETAMINE SULFATE 5; 5; 5; 5 MG/1; MG/1; MG/1; MG/1
20 CAPSULE, EXTENDED RELEASE ORAL EVERY MORNING
Qty: 30 CAPSULE | Refills: 0 | Status: SHIPPED | OUTPATIENT
Start: 2022-07-07 | End: 2022-09-08 | Stop reason: SDUPTHER

## 2022-09-06 ENCOUNTER — TELEPHONE (OUTPATIENT)
Dept: INTERNAL MEDICINE CLINIC | Age: 32
End: 2022-09-06

## 2022-09-06 RX ORDER — MELOXICAM 15 MG/1
1 TABLET ORAL DAILY
COMMUNITY
Start: 2022-07-12 | End: 2022-09-08 | Stop reason: SDUPTHER

## 2022-09-08 ENCOUNTER — OFFICE VISIT (OUTPATIENT)
Dept: INTERNAL MEDICINE CLINIC | Age: 32
End: 2022-09-08
Payer: MEDICARE

## 2022-09-08 VITALS
OXYGEN SATURATION: 98 % | SYSTOLIC BLOOD PRESSURE: 136 MMHG | DIASTOLIC BLOOD PRESSURE: 78 MMHG | BODY MASS INDEX: 29.41 KG/M2 | HEIGHT: 70 IN | WEIGHT: 205.4 LBS | HEART RATE: 61 BPM

## 2022-09-08 DIAGNOSIS — G89.29 CHRONIC KNEE PAIN, UNSPECIFIED LATERALITY: ICD-10-CM

## 2022-09-08 DIAGNOSIS — M25.569 CHRONIC KNEE PAIN, UNSPECIFIED LATERALITY: ICD-10-CM

## 2022-09-08 DIAGNOSIS — F90.0 ATTENTION DEFICIT HYPERACTIVITY DISORDER (ADHD), PREDOMINANTLY INATTENTIVE TYPE: ICD-10-CM

## 2022-09-08 DIAGNOSIS — F33.0 MILD EPISODE OF RECURRENT MAJOR DEPRESSIVE DISORDER (HCC): Primary | ICD-10-CM

## 2022-09-08 PROCEDURE — G8417 CALC BMI ABV UP PARAM F/U: HCPCS | Performed by: NURSE PRACTITIONER

## 2022-09-08 PROCEDURE — 1036F TOBACCO NON-USER: CPT | Performed by: NURSE PRACTITIONER

## 2022-09-08 PROCEDURE — 99213 OFFICE O/P EST LOW 20 MIN: CPT | Performed by: NURSE PRACTITIONER

## 2022-09-08 PROCEDURE — G8427 DOCREV CUR MEDS BY ELIG CLIN: HCPCS | Performed by: NURSE PRACTITIONER

## 2022-09-08 RX ORDER — MELOXICAM 15 MG/1
15 TABLET ORAL DAILY
Qty: 30 TABLET | Refills: 1 | Status: SHIPPED | OUTPATIENT
Start: 2022-09-08 | End: 2022-10-10 | Stop reason: SDUPTHER

## 2022-09-08 RX ORDER — DEXTROAMPHETAMINE SACCHARATE, AMPHETAMINE ASPARTATE MONOHYDRATE, DEXTROAMPHETAMINE SULFATE AND AMPHETAMINE SULFATE 5; 5; 5; 5 MG/1; MG/1; MG/1; MG/1
20 CAPSULE, EXTENDED RELEASE ORAL EVERY MORNING
Qty: 30 CAPSULE | Refills: 0 | Status: SHIPPED | OUTPATIENT
Start: 2022-09-08 | End: 2022-10-10 | Stop reason: SDUPTHER

## 2022-09-08 SDOH — ECONOMIC STABILITY: FOOD INSECURITY: WITHIN THE PAST 12 MONTHS, YOU WORRIED THAT YOUR FOOD WOULD RUN OUT BEFORE YOU GOT MONEY TO BUY MORE.: NEVER TRUE

## 2022-09-08 SDOH — ECONOMIC STABILITY: FOOD INSECURITY: WITHIN THE PAST 12 MONTHS, THE FOOD YOU BOUGHT JUST DIDN'T LAST AND YOU DIDN'T HAVE MONEY TO GET MORE.: NEVER TRUE

## 2022-09-08 ASSESSMENT — ENCOUNTER SYMPTOMS
DIARRHEA: 0
ABDOMINAL PAIN: 0
NAUSEA: 0
SORE THROAT: 0
CONSTIPATION: 0
WHEEZING: 0
EYE DISCHARGE: 0
TROUBLE SWALLOWING: 0
SHORTNESS OF BREATH: 0
COUGH: 0

## 2022-09-08 ASSESSMENT — SOCIAL DETERMINANTS OF HEALTH (SDOH): HOW HARD IS IT FOR YOU TO PAY FOR THE VERY BASICS LIKE FOOD, HOUSING, MEDICAL CARE, AND HEATING?: NOT HARD AT ALL

## 2022-09-08 NOTE — PROGRESS NOTES
Visit Information    Have you changed or started any medications since your last visit including any over-the-counter medicines, vitamins, or herbal medicines? no   Are you having any side effects from any of your medications? -  no  Have you stopped taking any of your medications? Is so, why? -  no    Have you seen any other physician or provider since your last visit? Yes - Records Obtained  Have you had any other diagnostic tests since your last visit? No  Have you been seen in the emergency room and/or had an admission to a hospital since we last saw you? No  Have you had your routine dental cleaning in the past 6 months? no    Have you activated your [employer] account? If not, what are your barriers?  Yes     Patient Care Team:  EUSEBIO Blackwell CNP as PCP - General (Internal Medicine)  EUSEBIO Blackwell CNP as PCP - [facility] Empaneled Provider  EUSEBIO Perez NP (Nurse Practitioner)    Medical History Review  Past Medical, Family, and Social History reviewed and does contribute to the patient presenting condition    Health Maintenance   Topic Date Due    HIV screen  Never done    Hepatitis C screen  Never done    DTaP/Tdap/Td vaccine (7 - Td or Tdap) 09/03/2014    COVID-19 Vaccine (2 - Booster for Mata series) 06/04/2021    Flu vaccine (1) Never done    Depression Monitoring  03/09/2023    Hepatitis B vaccine  Completed    Hib vaccine  Completed    Varicella vaccine  Completed    Hepatitis A vaccine  Aged Out    Meningococcal (ACWY) vaccine  Aged Out    Pneumococcal 0-64 years Vaccine  Aged Out         [address] [address]  Dept: [phone]  Dept Fax: [phone]    Office Progress/Follow Up Note  Date of patient's visit: 9/8/2022   Patient's Name:  Bossman Carrasco  YOB: 1990            Patient Care Team:  EUSEBIO Blackwell CNP as PCP - General (Internal Medicine)  EUSEBIO Blackwell CNP as PCP - [facility] Empaneled Provider  EUSEBIO Wisdom - LINDA (Nurse Practitioner)    REASON FOR VISIT: ADHD        HISTORY OF PRESENT ILLNESS:      History was obtained from the patient. Nickie Casanova is a 28 y.o. is here for ADHD    Doing ok on Adderall - helps with focus, denies SE. Seeing private counselorTyler Bruce Simon. Mood has declined over the last week, just from life, nothing specific. Patient Active Problem List   Diagnosis    High risk medication use    ADD (attention deficit disorder)    Vitamin D deficiency    Chronic fatigue    Mild episode of recurrent major depressive disorder (HCC)    BMI 27.0-27.9,adult    Nephrolithiasis    Epididymitis, left    Anxiety    Major depressive disorder, recurrent episode with anxious distress (HCC)    Cannabis abuse with cannabis-induced anxiety disorder (HCC)       No Known Allergies      MEDICATIONS:     Current Outpatient Medications   Medication Sig Dispense Refill    amphetamine-dextroamphetamine (ADDERALL XR) 20 MG extended release capsule Take 1 capsule by mouth every morning for 30 days. 30 capsule 0    meloxicam (MOBIC) 15 MG tablet Take 1 tablet by mouth daily 30 tablet 1    acetaminophen (TYLENOL) 650 MG extended release tablet Take 650 mg by mouth every 8 hours as needed       No current facility-administered medications for this visit. SOCIAL HISTORY    Reviewed and updated. Taurus  reports that he has never smoked. He has never used smokeless tobacco.    FAMILY HISTORY:    Reviewed and updated. family history includes Breast Cancer in his maternal grandmother; Hypertension in his mother; Stroke in his maternal grandmother. REVIEW OF SYSTEMS:      Review of Systems   Constitutional:  Negative for chills, fatigue and fever. HENT:  Negative for congestion, ear pain, sore throat and trouble swallowing. Eyes:  Negative for discharge and visual disturbance. Respiratory:  Negative for cough, shortness of breath and wheezing.     Cardiovascular: Negative for chest pain, palpitations and leg swelling. Gastrointestinal:  Negative for abdominal pain, constipation, diarrhea and nausea. Genitourinary:  Negative for difficulty urinating and dysuria. Musculoskeletal:  Positive for arthralgias. Negative for gait problem. Neurological:  Negative for dizziness and headaches. Psychiatric/Behavioral:  Positive for decreased concentration. Negative for self-injury, sleep disturbance and suicidal ideas. The patient is nervous/anxious and is hyperactive. PHYSICAL EXAM:      Vitals:    09/08/22 1010   BP: 136/78   Pulse: 61   SpO2: 98%   Weight: 205 lb 6.4 oz (93.2 kg)   Height: 5' 10\" (1.778 m)     BP Readings from Last 3 Encounters:   09/08/22 136/78   06/02/22 132/82   03/09/22 118/70        Physical Exam  Constitutional:       General: He is not in acute distress. Appearance: Normal appearance. He is well-developed. HENT:      Head: Normocephalic and atraumatic. Right Ear: External ear normal.      Left Ear: External ear normal.      Nose: Nose normal.   Eyes:      General:         Right eye: No discharge. Left eye: No discharge. Conjunctiva/sclera: Conjunctivae normal.      Pupils: Pupils are equal, round, and reactive to light. Neck:      Thyroid: No thyromegaly. Cardiovascular:      Rate and Rhythm: Normal rate and regular rhythm. Pulses: Normal pulses. Heart sounds: Normal heart sounds. No murmur heard. Pulmonary:      Effort: Pulmonary effort is normal.      Breath sounds: Normal breath sounds. No wheezing. Abdominal:      General: Bowel sounds are normal.      Palpations: Abdomen is soft. Tenderness: There is no abdominal tenderness. Musculoskeletal:      Cervical back: Normal range of motion and neck supple. No tenderness. Thoracic back: No tenderness. Normal range of motion. Lumbar back: No tenderness. Normal range of motion. Right knee: No swelling or deformity.  Normal range of motion. No tenderness. Left knee: Normal.      Right lower leg: No edema. Left lower leg: No edema. Skin:     General: Skin is warm and dry. Neurological:      Mental Status: He is alert. Gait: Gait normal.   Psychiatric:         Mood and Affect: Mood normal.         Behavior: Behavior normal.        LABORATORY FINDINGS:    CBC:   Lab Results   Component Value Date/Time    WBC 6.5 05/18/2021 12:20 PM    HGB 14.2 05/18/2021 12:20 PM     05/18/2021 12:20 PM     BMP:   Lab Results   Component Value Date/Time     05/18/2021 12:20 PM    K 4.7 05/18/2021 12:20 PM     05/18/2021 12:20 PM    CO2 24 05/18/2021 12:20 PM    BUN 8 05/18/2021 12:20 PM    CREATININE 0.80 05/18/2021 12:20 PM    GLUCOSE 88 05/18/2021 12:20 PM    GLUCOSE 90 12/05/2019 04:51 PM     HEMOGLOBIN A1C: No results found for: LABA1C  FASTING LIPID PANEL:   Lab Results   Component Value Date    CHOL 161 12/05/2019    HDL 42 12/05/2019    LDLCHOLESTEROL 99 02/27/2016    TRIG 75 12/05/2019       ASSESSMENT AND PLAN:      Visit Diagnoses and Associated Orders       Mild episode of recurrent major depressive disorder ([facility] [address]    -  Primary    Worsening, working with counselor. Will reprint list of psych providers for him. Attention deficit hyperactivity disorder (ADHD), predominantly inattentive type        Controlled, continue Adderall and again advised him to establish with psych. amphetamine-dextroamphetamine (ADDERALL XR) 20 MG extended release capsule [[id number]]           Chronic knee pain, unspecified laterality        meloxicam (MOBIC) 15 MG tablet [[id number]]                    FOLLOW UP AND INSTRUCTIONS:     Return in about 3 months (around 12/8/2022) for chronic conditions, or sooner if needed. Taurus received counseling on the following healthy behaviors: nutrition and exercise    Discussed use, benefit, and side effects of prescribed medications. Barriers to medication compliance addressed.   All patient questions answered. Pt voiced understanding. Etelvina Palacios, APRN - CNP    9/17/2022, 7:11 PM    Please note that this chart was generated using voice recognition Dragon dictation software. Although every effort was made to ensure the accuracy of this automatedtranscription, some errors in transcription may have occurred.

## 2022-10-10 DIAGNOSIS — G89.29 CHRONIC KNEE PAIN, UNSPECIFIED LATERALITY: ICD-10-CM

## 2022-10-10 DIAGNOSIS — M25.569 CHRONIC KNEE PAIN, UNSPECIFIED LATERALITY: ICD-10-CM

## 2022-10-10 DIAGNOSIS — F90.0 ATTENTION DEFICIT HYPERACTIVITY DISORDER (ADHD), PREDOMINANTLY INATTENTIVE TYPE: ICD-10-CM

## 2022-10-12 RX ORDER — MELOXICAM 15 MG/1
15 TABLET ORAL DAILY
Qty: 30 TABLET | Refills: 1 | Status: SHIPPED | OUTPATIENT
Start: 2022-10-12

## 2022-10-12 RX ORDER — DEXTROAMPHETAMINE SACCHARATE, AMPHETAMINE ASPARTATE MONOHYDRATE, DEXTROAMPHETAMINE SULFATE AND AMPHETAMINE SULFATE 5; 5; 5; 5 MG/1; MG/1; MG/1; MG/1
20 CAPSULE, EXTENDED RELEASE ORAL EVERY MORNING
Qty: 30 CAPSULE | Refills: 0 | Status: SHIPPED | OUTPATIENT
Start: 2022-10-12 | End: 2022-11-11

## 2022-10-27 LAB
ALBUMIN SERPL-MCNC: 4.9 G/DL
ALP BLD-CCNC: 47 U/L
ALT SERPL-CCNC: 22 U/L
ANION GAP SERPL CALCULATED.3IONS-SCNC: 9 MMOL/L
AST SERPL-CCNC: 32 U/L
BASOPHILS ABSOLUTE: 0 /ΜL
BASOPHILS RELATIVE PERCENT: 0.3 %
BILIRUB SERPL-MCNC: 1.1 MG/DL (ref 0.1–1.4)
BUN BLDV-MCNC: 14 MG/DL
CALCIUM SERPL-MCNC: 10.3 MG/DL
CHLORIDE BLD-SCNC: 103 MMOL/L
CHOLESTEROL, TOTAL: 178 MG/DL
CHOLESTEROL/HDL RATIO: 2.7
CO2: 29 MMOL/L
CREAT SERPL-MCNC: 1.09 MG/DL
EOSINOPHILS ABSOLUTE: 0 /ΜL
EOSINOPHILS RELATIVE PERCENT: 0.3 %
GFR CALCULATED: NORMAL
GLUCOSE BLD-MCNC: 86 MG/DL
HCT VFR BLD CALC: 45.3 % (ref 41–53)
HDLC SERPL-MCNC: 66 MG/DL (ref 35–70)
HEMOGLOBIN: 14.4 G/DL (ref 13.5–17.5)
HIV AG/AB: NON REACTIVE
LDL CHOLESTEROL CALCULATED: 104 MG/DL (ref 0–160)
LYMPHOCYTES ABSOLUTE: 1 /ΜL
LYMPHOCYTES RELATIVE PERCENT: 14.6 %
MCH RBC QN AUTO: 22.7 PG
MCHC RBC AUTO-ENTMCNC: 31.7 G/DL
MCV RBC AUTO: 72 FL
MONOCYTES ABSOLUTE: 0.5 /ΜL
MONOCYTES RELATIVE PERCENT: 6.6 %
NEUTROPHILS ABSOLUTE: 5.5 /ΜL
NEUTROPHILS RELATIVE PERCENT: 78.2 %
NONHDLC SERPL-MCNC: NORMAL MG/DL
PDW BLD-RTO: 15.5 %
PLATELET # BLD: 218 K/ΜL
PMV BLD AUTO: 8.3 FL
POTASSIUM SERPL-SCNC: 3.9 MMOL/L
RBC # BLD: 6.32 10^6/ΜL
SODIUM BLD-SCNC: 141 MMOL/L
TOTAL PROTEIN: 8.3
TRIGL SERPL-MCNC: 41 MG/DL
TSH SERPL DL<=0.05 MIU/L-ACNC: 0.6 UIU/ML
VLDLC SERPL CALC-MCNC: 8 MG/DL
WBC # BLD: 7 10^3/ML

## 2022-11-01 DIAGNOSIS — F33.0 MILD EPISODE OF RECURRENT MAJOR DEPRESSIVE DISORDER (HCC): ICD-10-CM

## 2022-11-01 DIAGNOSIS — Z11.4 ENCOUNTER FOR SCREENING FOR HIV: ICD-10-CM

## 2022-11-01 DIAGNOSIS — F90.0 ATTENTION DEFICIT HYPERACTIVITY DISORDER (ADHD), PREDOMINANTLY INATTENTIVE TYPE: ICD-10-CM

## 2022-11-01 DIAGNOSIS — Z13.220 LIPID SCREENING: ICD-10-CM

## 2022-11-17 DIAGNOSIS — F90.0 ATTENTION DEFICIT HYPERACTIVITY DISORDER (ADHD), PREDOMINANTLY INATTENTIVE TYPE: ICD-10-CM

## 2022-11-17 RX ORDER — DEXTROAMPHETAMINE SACCHARATE, AMPHETAMINE ASPARTATE MONOHYDRATE, DEXTROAMPHETAMINE SULFATE AND AMPHETAMINE SULFATE 5; 5; 5; 5 MG/1; MG/1; MG/1; MG/1
20 CAPSULE, EXTENDED RELEASE ORAL EVERY MORNING
Qty: 30 CAPSULE | Refills: 0 | Status: SHIPPED | OUTPATIENT
Start: 2022-11-17 | End: 2022-12-17

## 2023-01-04 DIAGNOSIS — M25.569 CHRONIC KNEE PAIN, UNSPECIFIED LATERALITY: ICD-10-CM

## 2023-01-04 DIAGNOSIS — F90.0 ATTENTION DEFICIT HYPERACTIVITY DISORDER (ADHD), PREDOMINANTLY INATTENTIVE TYPE: ICD-10-CM

## 2023-01-04 DIAGNOSIS — G89.29 CHRONIC KNEE PAIN, UNSPECIFIED LATERALITY: ICD-10-CM

## 2023-01-05 ENCOUNTER — PATIENT MESSAGE (OUTPATIENT)
Dept: INTERNAL MEDICINE CLINIC | Age: 33
End: 2023-01-05

## 2023-01-05 DIAGNOSIS — F90.0 ATTENTION DEFICIT HYPERACTIVITY DISORDER (ADHD), PREDOMINANTLY INATTENTIVE TYPE: ICD-10-CM

## 2023-01-05 RX ORDER — DEXTROAMPHETAMINE SACCHARATE, AMPHETAMINE ASPARTATE MONOHYDRATE, DEXTROAMPHETAMINE SULFATE AND AMPHETAMINE SULFATE 5; 5; 5; 5 MG/1; MG/1; MG/1; MG/1
20 CAPSULE, EXTENDED RELEASE ORAL EVERY MORNING
Qty: 30 CAPSULE | Refills: 0 | OUTPATIENT
Start: 2023-01-05 | End: 2023-02-04

## 2023-01-05 RX ORDER — DEXTROAMPHETAMINE SACCHARATE, AMPHETAMINE ASPARTATE MONOHYDRATE, DEXTROAMPHETAMINE SULFATE AND AMPHETAMINE SULFATE 5; 5; 5; 5 MG/1; MG/1; MG/1; MG/1
20 CAPSULE, EXTENDED RELEASE ORAL EVERY MORNING
Qty: 30 CAPSULE | Refills: 0 | Status: SHIPPED | OUTPATIENT
Start: 2023-01-05 | End: 2023-02-04

## 2023-01-05 RX ORDER — MELOXICAM 15 MG/1
15 TABLET ORAL DAILY
Qty: 30 TABLET | Refills: 1 | Status: SHIPPED | OUTPATIENT
Start: 2023-01-05

## 2023-01-09 RX ORDER — DEXTROAMPHETAMINE SACCHARATE, AMPHETAMINE ASPARTATE MONOHYDRATE, DEXTROAMPHETAMINE SULFATE AND AMPHETAMINE SULFATE 5; 5; 5; 5 MG/1; MG/1; MG/1; MG/1
20 CAPSULE, EXTENDED RELEASE ORAL EVERY MORNING
Qty: 30 CAPSULE | Refills: 0 | Status: SHIPPED | OUTPATIENT
Start: 2023-01-09 | End: 2023-02-08

## 2023-01-09 NOTE — TELEPHONE ENCOUNTER
From: Hermelinda Quarles  To: Ras Hernandez  Sent: 1/5/2023 1:59 PM EST  Subject: Adderall     Hi,     I know you sent a prescription to [facility] to be filled but they reached out to me and informed that they and other area [address] [place] [address] had been out of back stock for the last 2 weeks. They advises me to call around to see who had back stock and the [place] I put down said they did.

## 2023-01-17 ENCOUNTER — TELEPHONE (OUTPATIENT)
Dept: INTERNAL MEDICINE CLINIC | Age: 33
End: 2023-01-17

## 2023-01-26 ENCOUNTER — OFFICE VISIT (OUTPATIENT)
Dept: INTERNAL MEDICINE CLINIC | Age: 33
End: 2023-01-26
Payer: MEDICARE

## 2023-01-26 VITALS — DIASTOLIC BLOOD PRESSURE: 80 MMHG | BODY MASS INDEX: 29.39 KG/M2 | WEIGHT: 204.8 LBS | SYSTOLIC BLOOD PRESSURE: 122 MMHG

## 2023-01-26 DIAGNOSIS — F90.0 ATTENTION DEFICIT HYPERACTIVITY DISORDER (ADHD), PREDOMINANTLY INATTENTIVE TYPE: ICD-10-CM

## 2023-01-26 DIAGNOSIS — F33.9 MAJOR DEPRESSIVE DISORDER, RECURRENT EPISODE WITH ANXIOUS DISTRESS (HCC): Primary | ICD-10-CM

## 2023-01-26 PROCEDURE — G8417 CALC BMI ABV UP PARAM F/U: HCPCS | Performed by: NURSE PRACTITIONER

## 2023-01-26 PROCEDURE — 99213 OFFICE O/P EST LOW 20 MIN: CPT | Performed by: NURSE PRACTITIONER

## 2023-01-26 PROCEDURE — G8427 DOCREV CUR MEDS BY ELIG CLIN: HCPCS | Performed by: NURSE PRACTITIONER

## 2023-01-26 PROCEDURE — 1036F TOBACCO NON-USER: CPT | Performed by: NURSE PRACTITIONER

## 2023-01-26 PROCEDURE — G8484 FLU IMMUNIZE NO ADMIN: HCPCS | Performed by: NURSE PRACTITIONER

## 2023-01-26 RX ORDER — PAROXETINE HYDROCHLORIDE 20 MG/1
20 TABLET, FILM COATED ORAL EVERY MORNING
COMMUNITY

## 2023-01-26 NOTE — PROGRESS NOTES
Visit Information    Have you changed or started any medications since your last visit including any over-the-counter medicines, vitamins, or herbal medicines? no   Are you having any side effects from any of your medications? -  no  Have you stopped taking any of your medications? Is so, why? -  no    Have you seen any other physician or provider since your last visit? No  Have you had any other diagnostic tests since your last visit? No  Have you been seen in the emergency room and/or had an admission to a hospital since we last saw you? No  Have you had your routine dental cleaning in the past 6 months? no    Have you activated your [employer] account? If not, what are your barriers?  Yes     Patient Care Team:  EUSEBIO Reid CNP as PCP - General (Internal Medicine)  EUSEBIO Reid CNP as PCP - Empaneled Provider  EUSEBIO Brice NP (Nurse Practitioner)    Medical History Review  Past Medical, Family, and Social History reviewed and does contribute to the patient presenting condition    Health Maintenance   Topic Date Due    DTaP/Tdap/Td vaccine (7 - Td or Tdap) 09/03/2014    COVID-19 Vaccine (2 - Booster for Mata series) 06/04/2021    Flu vaccine (1) Never done    Depression Monitoring  03/09/2023    Hib vaccine  Completed    Varicella vaccine  Completed    Hepatitis C screen  Completed    HIV screen  Completed    Hepatitis A vaccine  Aged Out    Meningococcal (ACWY) vaccine  Aged Out    Pneumococcal 0-64 years Vaccine  Aged Out         [address] [address]  Dept: [phone]  Dept Fax: [phone]    Office Progress/Follow Up Note  Date of patient's visit: 1/26/2023   Patient's Name:  Miriam Kat  YOB: 1990            Patient Care Team:  EUSEBIO Reid CNP as PCP - General (Internal Medicine)  EUSEBIO Reid CNP as PCP - Empaneled Provider  EUSEBIO Brice NP (Nurse Practitioner)    REASON FOR VISIT: Depression (Follow up ) and ADHD        HISTORY OF PRESENT ILLNESS:      History was obtained from the patient. Kathie Beach is a 33 y.o. is here for Depression (Follow up ) and ADHD    HPI  Patient is here for follow up of anxiety/depression and ADHD. Reports he Just started paxil last week, prescribed by NP Katharine Seay on [employer]. Not getting counseling with henry. He has been having More anxiety symptoms, hasn't noticed much difference yet but knows it can take 4+ weeks. Adderall - no SE, using it M-F, skips the weekend. Following with ortho, plan for MRI in Feb for labrum tear. Patient Active Problem List   Diagnosis    High risk medication use    ADD (attention deficit disorder)    Vitamin D deficiency    Chronic fatigue    Mild episode of recurrent major depressive disorder (HCC)    BMI 27.0-27.9,adult    Nephrolithiasis    Epididymitis, left    Anxiety    Major depressive disorder, recurrent episode with anxious distress (HCC)    Cannabis abuse with cannabis-induced anxiety disorder (HCC)       No Known Allergies      MEDICATIONS:     Current Outpatient Medications   Medication Sig Dispense Refill    PARoxetine (PAXIL) 20 MG tablet Take 20 mg by mouth every morning      amphetamine-dextroamphetamine (ADDERALL XR) 20 MG extended release capsule Take 1 capsule by mouth every morning for 30 days. 30 capsule 0    meloxicam (MOBIC) 15 MG tablet Take 1 tablet by mouth daily 30 tablet 1    acetaminophen (TYLENOL) 650 MG extended release tablet Take 650 mg by mouth every 8 hours as needed       No current facility-administered medications for this visit. SOCIAL HISTORY    Reviewed and updated. Taurus  reports that he has never smoked. He has never used smokeless tobacco.    FAMILY HISTORY:    Reviewed and updated.   family history includes Breast Cancer in his maternal grandmother; Hypertension in his mother; Stroke in his maternal grandmother. REVIEW OF SYSTEMS:      Review of Systems   Constitutional:  Negative for chills, fatigue and fever. HENT:  Negative for congestion, ear pain, sore throat and trouble swallowing. Eyes:  Negative for discharge and visual disturbance. Respiratory:  Negative for cough, shortness of breath and wheezing. Cardiovascular:  Negative for chest pain, palpitations and leg swelling. Gastrointestinal:  Negative for abdominal pain, constipation, diarrhea and nausea. Musculoskeletal:  Positive for arthralgias. Negative for gait problem. Neurological:  Negative for dizziness and headaches. Psychiatric/Behavioral:  Positive for decreased concentration and dysphoric mood. Negative for sleep disturbance. The patient is nervous/anxious and is hyperactive. PHYSICAL EXAM:      Vitals:    01/26/23 1159   BP: 122/80   Site: Right Upper Arm   Weight: 204 lb 12.8 oz (92.9 kg)     BP Readings from Last 3 Encounters:   01/26/23 122/80   09/08/22 136/78   06/02/22 132/82        Physical Exam  Constitutional:       General: He is not in acute distress. Appearance: Normal appearance. He is well-developed. HENT:      Head: Normocephalic and atraumatic. Right Ear: External ear normal.      Left Ear: External ear normal.      Nose: Nose normal.   Eyes:      General:         Right eye: No discharge. Left eye: No discharge. Conjunctiva/sclera: Conjunctivae normal.      Pupils: Pupils are equal, round, and reactive to light. Neck:      Thyroid: No thyromegaly. Cardiovascular:      Rate and Rhythm: Normal rate and regular rhythm. Pulses: Normal pulses. Heart sounds: Normal heart sounds. No murmur heard. Pulmonary:      Effort: Pulmonary effort is normal.      Breath sounds: Normal breath sounds. No wheezing. Abdominal:      General: Bowel sounds are normal.      Palpations: Abdomen is soft. Tenderness: There is no abdominal tenderness.    Musculoskeletal:      Cervical back: Normal range of motion and neck supple. No tenderness. Thoracic back: No tenderness. Normal range of motion. Lumbar back: No tenderness. Normal range of motion. Right knee: No swelling or deformity. Normal range of motion. No tenderness. Left knee: Normal.      Right lower leg: No edema. Left lower leg: No edema. Skin:     General: Skin is warm and dry. Neurological:      Mental Status: He is alert. Gait: Gait normal.   Psychiatric:         Mood and Affect: Mood normal.         Behavior: Behavior normal.        LABORATORY FINDINGS:    CBC:   Lab Results   Component Value Date/Time    WBC 7.0 10/27/2022 12:00 AM    HGB 14.4 10/27/2022 12:00 AM     10/27/2022 12:00 AM     BMP:   Lab Results   Component Value Date/Time     10/27/2022 12:00 AM    K 3.9 10/27/2022 12:00 AM     10/27/2022 12:00 AM    CO2 29 10/27/2022 12:00 AM    BUN 14 10/27/2022 12:00 AM    CREATININE 1.09 10/27/2022 12:00 AM    GLUCOSE 86 10/27/2022 12:00 AM    GLUCOSE 90 12/05/2019 04:51 PM     HEMOGLOBIN A1C: No results found for: LABA1C  FASTING LIPID PANEL:   Lab Results   Component Value Date    CHOL 178 10/27/2022    HDL 66 10/27/2022    LDLCHOLESTEROL 99 02/27/2016    TRIG 41 10/27/2022       ASSESSMENT AND PLAN:      Visit Diagnoses and Associated Orders       Major depressive disorder, recurrent episode with anxious distress ([facility] [address]    -  Primary    Not controlled, recently started on Paxil. Following with psych NP on an henry. Discussed that we can manage this in future if he prefers    PARoxetine (PAXIL) 20 MG tablet [[id number]]           Attention deficit hyperactivity disorder (ADHD), predominantly inattentive type        Controlled on Adderall, continue current dose. FOLLOW UP AND INSTRUCTIONS:     Return in about 3 months (around 4/26/2023) for ADD, or sooner if needed.     Taurus received counseling on the following healthy behaviors: nutrition, exercise, and medication adherence    Discussed use, benefit, and side effects of prescribed medications. Barriers to medication compliance addressed. All patient questions answered. Pt voiced understanding. EUSEBIO Glasgow CNP    2/8/2023, 10:19 PM    Please note that this chart was generated using voice recognition Dragon dictation software. Although every effort was made to ensure the accuracy of this automatedtranscription, some errors in transcription may have occurred.

## 2023-02-08 ASSESSMENT — ENCOUNTER SYMPTOMS
SHORTNESS OF BREATH: 0
DIARRHEA: 0
NAUSEA: 0
EYE DISCHARGE: 0
TROUBLE SWALLOWING: 0
ABDOMINAL PAIN: 0
WHEEZING: 0
CONSTIPATION: 0
COUGH: 0
SORE THROAT: 0

## 2023-02-14 DIAGNOSIS — M25.569 CHRONIC KNEE PAIN, UNSPECIFIED LATERALITY: ICD-10-CM

## 2023-02-14 DIAGNOSIS — F90.0 ATTENTION DEFICIT HYPERACTIVITY DISORDER (ADHD), PREDOMINANTLY INATTENTIVE TYPE: ICD-10-CM

## 2023-02-14 DIAGNOSIS — G89.29 CHRONIC KNEE PAIN, UNSPECIFIED LATERALITY: ICD-10-CM

## 2023-02-14 RX ORDER — DEXTROAMPHETAMINE SACCHARATE, AMPHETAMINE ASPARTATE MONOHYDRATE, DEXTROAMPHETAMINE SULFATE AND AMPHETAMINE SULFATE 5; 5; 5; 5 MG/1; MG/1; MG/1; MG/1
20 CAPSULE, EXTENDED RELEASE ORAL EVERY MORNING
Qty: 30 CAPSULE | Refills: 0 | Status: SHIPPED | OUTPATIENT
Start: 2023-02-14 | End: 2023-02-15 | Stop reason: SDUPTHER

## 2023-02-14 RX ORDER — MELOXICAM 15 MG/1
15 TABLET ORAL DAILY
Qty: 30 TABLET | Refills: 1 | Status: SHIPPED | OUTPATIENT
Start: 2023-02-14

## 2023-03-07 ENCOUNTER — OFFICE VISIT (OUTPATIENT)
Dept: ORTHOPEDIC SURGERY | Age: 33
End: 2023-03-07
Payer: MEDICAID

## 2023-03-07 VITALS — BODY MASS INDEX: 31.64 KG/M2 | RESPIRATION RATE: 16 BRPM | HEIGHT: 70 IN | WEIGHT: 221 LBS

## 2023-03-07 DIAGNOSIS — M25.511 ACUTE PAIN OF RIGHT SHOULDER: Primary | ICD-10-CM

## 2023-03-07 DIAGNOSIS — S43.431A LABRAL TEAR OF SHOULDER, RIGHT, INITIAL ENCOUNTER: ICD-10-CM

## 2023-03-07 PROCEDURE — 99204 OFFICE O/P NEW MOD 45 MIN: CPT | Performed by: STUDENT IN AN ORGANIZED HEALTH CARE EDUCATION/TRAINING PROGRAM

## 2023-03-07 NOTE — PROGRESS NOTES
CHIEF COMPLAINT: Right shoulder pain    DATE OF onset: October 2022    History:    Chidi Figueroa is a 33 y.o. right handed male referred by EUSEBIO Gandara CNP  for Sports Medicine consultation  for evaluation and treatment of Right shoulder pain. This is evaluated as a personal injury. The pain began 5 months ago. Pain is rated as a 5/10. There was an injury. He plays volleyball recreationally and landed on his shoulder with his arm abducted at his chest.  Since then his pain is progressively been getting worse. Pain is located at the joint line. He has done a trial of 6 weeks of physical therapy with no relief. He complains of constant fluctuating pain increased with hitting during volleyball, driving and with overhead activity. He has tried meloxicam and ice as well with no relief. He has not had an injection. His occupation is a . He was seen by an orthopedic surgeon at the [facility] who ordered an MRI. Past Medical History:   Diagnosis Date    ADD (attention deficit disorder)     Anxiety     Chronic pain of both knees     Moderate recurrent major depression ([facility] [address] 1/15/2020    Nephrolithiasis     Recurrent major depressive disorder ([facility] [address]     Vitamin D deficiency        Past Surgical History:   Procedure Laterality Date    KIDNEY SURGERY Left 1993    WRIST FRACTURE SURGERY Right 2017       Current Outpatient Medications on File Prior to Visit   Medication Sig Dispense Refill    amphetamine-dextroamphetamine (ADDERALL XR) 20 MG extended release capsule Take 1 capsule by mouth every morning for 30 days. 30 capsule 0    meloxicam (MOBIC) 15 MG tablet Take 1 tablet by mouth daily 30 tablet 1    PARoxetine (PAXIL) 20 MG tablet Take 20 mg by mouth every morning      acetaminophen (TYLENOL) 650 MG extended release tablet Take 650 mg by mouth every 8 hours as needed       No current facility-administered medications on file prior to visit.        No Known Allergies    Social History     Socioeconomic History    Marital status: Single     Spouse name: Not on file    Number of children: Not on file    Years of education: Not on file    Highest education level: Not on file   Occupational History    Not on file   Tobacco Use    Smoking status: Never    Smokeless tobacco: Never   Substance and Sexual Activity    Alcohol use: Yes     Alcohol/week: 0.0 standard drinks     Comment: socially    Drug use: Yes     Frequency: 2.0 times per week    Sexual activity: Not on file   Other Topics Concern    Not on file   Social History Narrative    Not on file     Social Determinants of Health     Financial Resource Strain: Low Risk     Difficulty of Paying Living Expenses: Not hard at all   Food Insecurity: No Food Insecurity    Worried About Running Out of Food in the Last Year: Never true    Ran Out of Food in the Last Year: Never true   Transportation Needs: Not on file   Physical Activity: Not on file   Stress: Not on file   Social Connections: Not on file   Intimate Partner Violence: Not on file   Housing Stability: Not on file       Family History   Problem Relation Age of Onset    Hypertension Mother     Stroke Maternal Grandmother     Breast Cancer Maternal Grandmother            Physical Examination:      Vital signs:  Resp 16   Ht 5' 10\" (1.778 m)   Wt 221 lb (100.2 kg)   BMI 31.71 kg/m²     General:   alert, appears stated age, cooperative, and no distress   Right Shoulder   Active ROM:   forward flexion 180, external rotation 85, internal rotation T10.     Left shoulder: equivocal     Passive ROM:  forward flexion 180   Left shoulder: forward flexion 180   Joint Tenderness:   Joint line    Neer:   negative   Cheung:   negative   Strength:   5-/5 supraspinatus, 5/5 ER and IR    Left shoulder: 5/5 supraspinatus, 5/5 ER and IR    Drop-arm test:   negative   Belly-press test:   negative   Bear-hug test:   negative   Speed's test:   negative   Bicipital groove tenderness: negative   Friedman's test:   positive   Cross-body adduction test:   negative    AC joint tenderness:   negative   Scapular dyskinesis:   absent  Left shoulder: absent   Atrophy:   none noted     Left shoulder: none noted      There are no skin lesions, cellulitis, or extreme edema in the upper extremities. Sensation is grossly intact to light touch bilaterally upper extremity. The patient has warm and well-perfused Bilateral upper extremities with brisk capillary refill.      Imaging   Right Shoulder X-Ray: 3 views obtained and reviewed  AC Joint: no abnormalities noted  Glenohumeral joint: no abnormalities noted  Elevation humeral head: absent    Right Shoulder MR arthrogram [facility] 2/14/2023: reviewed  There is fraying of the anterior glenoid labrum contrast extending through a   minimally displaced tear please see series 2 image 12 for example.     There is a perforating full-thickness tear of the supraspinatus tendon with a   small amount of contrast reaching the subacromial bursa.     There also is a partial-thickness articular surface tear at the insertion of the   infraspinatus tendon.      Upon my review there is not appear to be a full-thickness tear of the supraspinatus tendon however there is inflammation beneath the subacromial bursa.          Assessment:      Right shoulder labral tear  Depression  Vitamin D deficiency      Plan:      We discussed that he has failed nonoperative treatment for the labral tear at this point as he has completed 6 weeks of physical therapy with no improvement in his symptoms.  He continues to complain of pain with overhead activity and states it is progressively getting worse.  I do not think his rotator cuff is bothering him as his exam on his RTC is benign. He is having labral pain. We discussed options such as continuing conservative management versus surgery. The patient elected to proceed with surgery.    I had an extensive discussion with Mr. Taurus CHAMBERS  Pura Tomas and/or family regarding the natural history, etiology, and long term consequences of his condition. I have presented reasonable alternatives to the patient's proposed care, treatment, and services. I have outlined a treatment plan with them and, in my opinion, surgical intervention is indicated at this time. Discussion I have had encompassed risks, benefits, and side effects related to the alternatives and the risks related to not receiving the proposed care, treatment, and services. I have discussed the potential complications (including, but not limited to injury to nerve or blood vessel, infection, bleeding, DVT or PE, stiffness, incomplete pain relief, need for further surgery, and anesthetic complications), limitations, expectations, alternatives, and risks of the surgical procedure. We also discussed the importance of postoperative rehabilitation. He has had full opportunity to ask his questions. I have answered them all to his satisfaction. I feel that Mr. Blayne Odell understands our discussion today and he will provide written informed consent for the procedure. H&P can be completed by Dr. Edita Mckinney the day of surgery. Plan for right shoulder arthroscopy, labral repair with Dr. Edita Mckinney. Madhu Hyde PA-C  Board Certified by the [employer] on Certification of [address] and [address]: This note was generated with use of a verbal recognition program and an attempt was made to check for errors. It is possible that there are still dictated errors within this office note. If so, please bring any significant errors to my attention for an addendum. All efforts were made to ensure that this office note is accurate.

## 2023-03-07 NOTE — LETTER
Your surgery has been scheduled with Dr. Amanda Duron. DATE OF SURGERY:      3/31/2023     ARRIVAL TIME:      7:10am     TIME OF SURGERY:      9:10am     LOCATION: [address] [place] [address] [place] [address]    **PLEASE NOTE: Surgery times are SUBJECT TO CHANGE. Please be available for alterations as needed. [facility] ([facility]) appreciates your flexibility with this matter. **    Report to Surgery Registration, which is located in the main lobby of the surgery center. PLEASE DO NOT EAT OR DRINK ANYTHING AFTER MIDNIGHT THE NIGHT BEFORE YOUR SURGERY. YOU WILL ALSO NEED TO HAVE A . POST-OP APPOINTMENT: 4/3/2023 at 10:15am at the [address] [place] [address] [facility] [address] (building next to hospital)  POST-OP APPOINTMENT: 4/18/2023 at 9:45am at the [facility] will need to make an appointment with your family physician so he/she can do a pre-operative history and physical.  The history & physical cannot be completed more than 30 days prior to surgery. Please have the family physician fax the completed form to [facility] ([facility]) at the number on your packet. Your pre-operative instructions and history and physical forms are included in this folder. The hospital also requires several routine tests that will be done the day of your surgery. Because your surgery is scheduled as an outpatient procedure it will be necessary to have a responsible person with you for transportation. Please review all information given to you prior to your surgery date. If there are any questions, please do not hesitate to call our office at [phone]. Dear Patient:    As a valued customer, we would like to thank you for choosing [facility] for your upcoming surgery/procedure. CHECKLIST FOR SURGERY:    [] History & physical exam by your primary care physician within 30 days of your surgery date.       [x] Medical clearance by a cardiologist, pulmonologist, endocrinologist, oncologist, or other specialist(s) if you are under their care. Please contact their office to obtain the clearance needed based on their discretion. [x] COVID testing is not currently required unless you develop symptoms. Should this be the case, you will need to contact our office immediately to discuss how to proceed. [x] No aspirin products or blood thinners (including Excedrin) for one week prior to surgery. This includes NSAIDs, such as Aleve, Advil, Ibuprofen, Motrin, Naproxen, etc.   **If you are on an anti-coagulant, such as Plavix, Brilinta, Warfarin, etc., please consult your prescribing provider regarding stopping this medication. [x] Nothing to eat or drink after midnight the night before surgery. [x] You will need to have a  the day of surgery. [x] Inform office of any changes in insurance, address or phone numbers. [] Provide a copy of your advanced directive/durable power of /living will on the day of your surgery/procedure. ** You will receive at least two calls from the Hospital.  One will be from Registration to pre-register you and go over your address, phone number, and insurance information. You will also hear from the Pre-Admission testing nurses. They will want to get a brief medical history and also go over your list of medications.

## 2023-03-07 NOTE — LETTER
Surgery Scheduling Form:    Patient Name:  Bossman Carrasco  Patient :  1990     Patient MR#:  [id number]    Patient Address:  [address] [place] [address]    Surgeon:  Harvey Roach M.D.    PCP:  EUSEBIO Blackwell CNP  Insurance:  Payor: [place]ICA[place] / Plan: Marceil Og / Product Type: *No Product type* /       Diagnosis:  Right shoulder labral tear S43.431A    Operation:  Right shoulder arthroscopy, labral repair . Location:  [facility]  Surgeon's Scheduling Instruction:  elective  Requested Date:     OR Time:       Patient Arrival Time:    OR Time Required:  76  Minutes    Anesthesia:  General + block  Surgical Assistant required:  Yes   Equipment:  Arthrex  Standard C-Arm:  No    Mini C-Arm:  No  Status:  Outpatient   PAT Required:  Yes    Comments:   History and Physical: Dr. Bina Roach will perform H+P the day of surgery  Covid test: As of 3/23/2022: no test needed if symptom free              Harvey Roach MD      3/7/23 12:34 PM EST

## 2023-03-07 NOTE — LETTER
Surgery Scheduling Form:    Patient Name:  Karen Sanchez  Patient :  1990     Patient MR#:  [id number]    Patient Address:  [address] [place] [address]    Surgeon:  Estella Huang M.D.    PCP:  EUSEBIO Funk CNP  Insurance:    Payer/Plan Subscr  Sub. Ins. ID Effective Group Num   1. [facility]* BOBBI IRENE* 1990 [id number]      Diagnosis:  Right shoulder labral tear V20.711D    Operation:  Right shoulder arthroscopy, labral repair. [id number]    Location:  [facility]   Surgeon's Scheduling Instruction:  elective  Requested Date: 2023  OR Time:  1:45pm     Patient Arrival Time:  12:15pm  OR Time Required:  75  Minutes    Anesthesia:  General + block  Surgical Assistant required:  Yes   Equipment:  Arthrex  Standard C-Arm:  No    Mini C-Arm:  No  Status:  Outpatient   PAT Required:  Yes    Comments:   History and Physical: Dr. Rossy Huang will perform H+P the day of surgery  Covid test: As of 3/23/2022: no test needed if symptom free              Estella Huang MD      3/7/23 12:47 PM EST                     ORTHOPEDIC SURGERY  PRE-SURGICAL PHYSICIANS ORDERS  Patient Name: Karen Sanchez  Surgery Date: 2023  History and Physical:   [] [facility]    [x]By Surgeon   []By PCP    Referrals:  []Medical /Cardiac Clearance by anesthesia guidelines   []Joint Replacement Class    Pre Admission Testin.  Initiate PAT Protocol  [] Hemoglobin & Hematocrit  [] Comprehensive Metabolic Panel  [] CBC with differential  [] Type & Screen  [] CBC without differential  [] Type and Crossmatch ____________units  [] PT/INR    [] A1C       [] Vitamin D Level    [] PTT       [] Chest X-ray   [] Urinalysis      [] EKG    [] Urinalysis with C & S      [] Nasal Swab for MRSA screening [] Basic Metabolic Panel     [x] Other Defer to anesthesia for pre-admission testing orders  Orders to be initiated upon admission to Same Day Surgery:   [] Fasting blood sugar by finger stick  [] PT/INR (pt. takes warfarin / coumadin)  [] HCG  [x] Other Defer to anesthesia   IV Fluid and Medications:  1. Place IV catheter for IV access. The RN may use 0.1 ml of 1% Lidocaine SQ per site for IV starts up to a maximum of 0.5 mL  2. Infuse Lactated Ringers IV at 50 m/hr. Diabetic or renally impaired, infuse 0.9% Normal Saline at 50 ml/hour  3. Cefazolin 2g IV OR 3 g if >120 kg, given within one hour of incision time. If allergic to Penicillin or Cephalosporins, give within two hours of incision time Vancomycin 1 Gram IV if less than or equal to 80 kg OR 1.5 Grams if  kg OR 2 Grams If > 120 kg. If allergic to Penicillin or Cephalosporins and Vancomycin give Clindamycin 900mg within 1 hour of incision. 4. If + MRSA nasal swab give Vancomycin 1 Gram IV if less than or equal to 80 kg OR 1.5 Grams if  kg OR  2 Grams if > 120 kg  5.  Other Medications: Tranexamic Acid 1g IV [] Pre-op only []Pre-Op and Intra-op  [] Ortho mix  []Decadron 10mg IV Preop unless patient is diabetic     []  Oxycodone ER 20mg for patients less than 72years old preop oral x 1  []Oxycodone ER 10mg for patients 65 years or older preop oral x 1  [] Additional:  ***  Physician Signature  3/10/2023    8:56 AM        Additional Orders:  1. [] Knee high   []Thigh high   anti-embolism stockings and [x] antithrombic compression pumps (apply in Pre-op)         Physician Signature  3/10/2023    8:56 AM     Fax to Charu Altamirano [phone]   [id number] Revised 8-22

## 2023-03-09 ENCOUNTER — TELEPHONE (OUTPATIENT)
Dept: ORTHOPEDIC SURGERY | Age: 33
End: 2023-03-09

## 2023-03-09 NOTE — TELEPHONE ENCOUNTER
Auth: [employer]  Date: 03/31/23  Reference # [id number]  Spoke with: Online  Type of SX: Outpatient  Location: [facility]  CPT: 33902   DX: L13.920W  SX area: Rt shoulder  Insurance: Responsa

## 2023-03-09 NOTE — TELEPHONE ENCOUNTER
Surgery and/or Procedure Scheduling     Contact Name: Taurus Mayen  Surgical/Procedure Request: Sx on Rt shoulder   Patient Contact Number: [phone]    Patient call and he would like to reschedule his surgery on 03/31/23 he would like to know if he can get reschedule for 03/30/23 at the  Location. He just need a call back regarding this matter.

## 2023-03-10 NOTE — TELEPHONE ENCOUNTER
S/W LILA  Surgery rescheduled. Will reschedule PO appointments and provide updated surgery paperwork through the [facility] portal. Patient voiced understanding of the conversation and will contact the office with further questions or concerns.

## 2023-03-22 DIAGNOSIS — M25.569 CHRONIC KNEE PAIN, UNSPECIFIED LATERALITY: ICD-10-CM

## 2023-03-22 DIAGNOSIS — F90.0 ATTENTION DEFICIT HYPERACTIVITY DISORDER (ADHD), PREDOMINANTLY INATTENTIVE TYPE: ICD-10-CM

## 2023-03-22 DIAGNOSIS — G89.29 CHRONIC KNEE PAIN, UNSPECIFIED LATERALITY: ICD-10-CM

## 2023-03-22 RX ORDER — DEXTROAMPHETAMINE SACCHARATE, AMPHETAMINE ASPARTATE MONOHYDRATE, DEXTROAMPHETAMINE SULFATE AND AMPHETAMINE SULFATE 5; 5; 5; 5 MG/1; MG/1; MG/1; MG/1
20 CAPSULE, EXTENDED RELEASE ORAL EVERY MORNING
Qty: 30 CAPSULE | Refills: 0 | Status: SHIPPED | OUTPATIENT
Start: 2023-03-22 | End: 2023-04-21

## 2023-03-22 RX ORDER — MELOXICAM 15 MG/1
15 TABLET ORAL DAILY
Qty: 30 TABLET | Refills: 1 | Status: SHIPPED | OUTPATIENT
Start: 2023-03-22

## 2023-03-29 NOTE — H&P
History:    Zelalem Vaca is a 33 y.o. right handed male here for right shoulder arthroscopy. Past Medical History        Past Medical History:   Diagnosis Date    ADD (attention deficit disorder)      Anxiety      Chronic pain of both knees      Moderate recurrent major depression ([facility] [address] 1/15/2020    Nephrolithiasis      Recurrent major depressive disorder ([facility] [address]      Vitamin D deficiency              Past Surgical History         Past Surgical History:   Procedure Laterality Date    KIDNEY SURGERY Left 1993    WRIST FRACTURE SURGERY Right 2017                   Current Outpatient Medications on File Prior to Visit   Medication Sig Dispense Refill    amphetamine-dextroamphetamine (ADDERALL XR) 20 MG extended release capsule Take 1 capsule by mouth every morning for 30 days. 30 capsule 0    meloxicam (MOBIC) 15 MG tablet Take 1 tablet by mouth daily 30 tablet 1    PARoxetine (PAXIL) 20 MG tablet Take 20 mg by mouth every morning        acetaminophen (TYLENOL) 650 MG extended release tablet Take 650 mg by mouth every 8 hours as needed          No current facility-administered medications on file prior to visit. No Known Allergies     Social History               Socioeconomic History    Marital status: Single       Spouse name: Not on file    Number of children: Not on file    Years of education: Not on file    Highest education level: Not on file   Occupational History    Not on file   Tobacco Use    Smoking status: Never    Smokeless tobacco: Never   Substance and Sexual Activity    Alcohol use:  Yes       Alcohol/week: 0.0 standard drinks       Comment: socially    Drug use: Yes       Frequency: 2.0 times per week    Sexual activity: Not on file   Other Topics Concern    Not on file   Social History Narrative    Not on file      Social Determinants of Health          Financial Resource Strain: Low Risk     Difficulty of Paying Living Expenses: Not hard at all   Food Insecurity: No Food Insecurity Worried About Running Out of Food in the Last Year: Never true    Ran Out of Food in the Last Year: Never true   Transportation Needs: Not on file   Physical Activity: Not on file   Stress: Not on file   Social Connections: Not on file   Intimate Partner Violence: Not on file   Housing Stability: Not on file            Family History         Family History   Problem Relation Age of Onset    Hypertension Mother      Stroke Maternal Grandmother      Breast Cancer Maternal Grandmother                    Physical Examination:    BP (!) 148/106   Pulse 71   Temp 97.6 °F (36.4 °C) (Temporal)   Resp 16   Ht 6' (1.829 m)   Wt 215 lb (97.5 kg)   SpO2 100%   BMI 29.16 kg/m²   Airway is intact  Chest: breathing comfortably. Clear  Heart: regular rate  Findings on exam of the body region where surgery is to be performed include: see last office and/or consult note       Imaging   Right Shoulder X-Ray: AC Joint: no abnormalities noted  Glenohumeral joint: no abnormalities noted  Elevation humeral head: absent     Right Shoulder MR arthrogram [facility] 2/14/2023:   There is fraying of the anterior glenoid labrum contrast extending through a   minimally displaced tear please see series 2 image 12 for example. There is a perforating full-thickness tear of the supraspinatus tendon with a   small amount of contrast reaching the subacromial bursa. There also is a partial-thickness articular surface tear at the insertion of the   infraspinatus tendon. Upon my review there is not appear to be a full-thickness tear of the supraspinatus tendon however there is inflammation beneath the subacromial bursa. Assessment:       Right shoulder labral tear  Depression  Vitamin D deficiency        Plan: To OR for right shoulder arthroscopy, labral repair.         Electronically signed by Austin Hood MD on 3/30/2023 at 1:34 PM

## 2023-03-29 NOTE — DISCHARGE INSTRUCTIONS
Take the anti-inflammatory as scheduled with food, but take the narcotic and anti-nausea medication as needed. Narcotic pain medications can cause constipation. Make sure you are drinking adequate amounts of water. Take fiber supplements as needed. Consider using an over-the-counter stool softener. EXERCISE: Exercises are extremely important following arthroscopic surgery to help you regain the motion and strength of your shoulder. That is why we try to get you into physical therapy as soon as possible after surgery. FOLLOW-UP: You should already have your first postoperative visits scheduled at the time your surgery is scheduled. If you do not, please call the office at [phone] to make your appointment. At the first visit,we will perform a wound check and go over the pictures from your surgery. At the second visit, we will remove your stitches. ORTHOPEDIC/PODIATRY DISCHARGE INSTRUCTIONS    Follow your surgeons instructions. Make follow-up appointment. Observe operative area for signs of excessive bleeding such as a slow general ooze that saturates the dressing or bright red bleeding. In either case, apply pressure to the area and elevate if possible and call your surgeon right away. Observe the affected extremity for circulation or nerve impairment such as a change in color, numbness, tingling, coldness or increased pain. If any of these symptoms are present call your surgeon. Observe operative site for any signs of infection such as increased pain, redness, fever greater than 101 degrees, swelling, foul odor or drainage. Contact surgeon if any of these symptoms are present. If you become short of breath call your surgeon or go to the nearest emergency room. Remove dressing if directed by surgeon. Leave steristips or sutures or staples in place. You may loosen your ace wrap if it feels too tight, or if you have severe pain, or if it has swelling.   Elevate extremity as directed by surgeon. You may shower when directed by surgeon. Use ice pack as directed by surgeon. Do not use heat. Avoid stress to suture line such as pulling, pushing or tugging. Use sling as instructed by surgeon. Take medications as ordered. Take pain medication with food. Do not drive or operate machinery while taking narcotics. Call your surgeon for any questions or problems. ANESTHESIA DISCHARGE INSTRUCTIONS    Wear your seatbelt home. You are under the influence of drugs-do not drink alcohol,drive,operate machinery,or make any important decisions or sign any legal documentsfor 24 hours  A responsible adult needs to be with you for 24 hours. You may experience lightheadedness,dizziness,or sleepiness following surgery. Rest at home today- increase activity as tolerated. Progress slowly to a regular diet unless your physician has instructed you otherwise. Drink plenty of water. If nausea becomes a problem call your physician. Coughing,sore throat,and muscle aches are other side effects of anesthesia,and should improve with time. Do not drive,operate machinery while taking narcotics.

## 2023-03-30 ENCOUNTER — ANESTHESIA (OUTPATIENT)
Dept: OPERATING ROOM | Age: 33
End: 2023-03-30
Payer: MEDICAID

## 2023-03-30 ENCOUNTER — HOSPITAL ENCOUNTER (OUTPATIENT)
Age: 33
Setting detail: OUTPATIENT SURGERY
Discharge: HOME | End: 2023-03-30
Attending: ORTHOPAEDIC SURGERY | Admitting: ORTHOPAEDIC SURGERY
Payer: MEDICAID

## 2023-03-30 ENCOUNTER — ANESTHESIA EVENT (OUTPATIENT)
Dept: OPERATING ROOM | Age: 33
End: 2023-03-30
Payer: MEDICAID

## 2023-03-30 VITALS
OXYGEN SATURATION: 96 % | RESPIRATION RATE: 18 BRPM | BODY MASS INDEX: 29.12 KG/M2 | SYSTOLIC BLOOD PRESSURE: 138 MMHG | HEART RATE: 64 BPM | DIASTOLIC BLOOD PRESSURE: 95 MMHG | TEMPERATURE: 97.2 F | HEIGHT: 72 IN | WEIGHT: 215 LBS

## 2023-03-30 DIAGNOSIS — S43.431A TEAR OF RIGHT GLENOID LABRUM, INITIAL ENCOUNTER: Primary | ICD-10-CM

## 2023-03-30 PROBLEM — S46.011A TRAUMATIC INCOMPLETE TEAR OF RIGHT ROTATOR CUFF: Status: ACTIVE | Noted: 2023-03-30

## 2023-03-30 PROBLEM — M94.211 GLENOID CHONDROMALACIA OF RIGHT SHOULDER: Status: ACTIVE | Noted: 2023-03-30

## 2023-03-30 PROCEDURE — C1713 ANCHOR/SCREW BN/BN,TIS/BN: HCPCS | Performed by: ORTHOPAEDIC SURGERY

## 2023-03-30 PROCEDURE — 64415 NJX AA&/STRD BRCH PLXS IMG: CPT | Performed by: ANESTHESIOLOGY

## 2023-03-30 PROCEDURE — 7100000000 HC PACU RECOVERY - FIRST 15 MIN: Performed by: ORTHOPAEDIC SURGERY

## 2023-03-30 PROCEDURE — 7100000001 HC PACU RECOVERY - ADDTL 15 MIN: Performed by: ORTHOPAEDIC SURGERY

## 2023-03-30 PROCEDURE — 2709999900 HC NON-CHARGEABLE SUPPLY: Performed by: ORTHOPAEDIC SURGERY

## 2023-03-30 PROCEDURE — 2580000003 HC RX 258: Performed by: ORTHOPAEDIC SURGERY

## 2023-03-30 PROCEDURE — 2720000010 HC SURG SUPPLY STERILE: Performed by: ORTHOPAEDIC SURGERY

## 2023-03-30 PROCEDURE — 6370000000 HC RX 637 (ALT 250 FOR IP): Performed by: ORTHOPAEDIC SURGERY

## 2023-03-30 PROCEDURE — 3700000000 HC ANESTHESIA ATTENDED CARE: Performed by: ORTHOPAEDIC SURGERY

## 2023-03-30 PROCEDURE — C1769 GUIDE WIRE: HCPCS | Performed by: ORTHOPAEDIC SURGERY

## 2023-03-30 PROCEDURE — 2500000003 HC RX 250 WO HCPCS: Performed by: NURSE ANESTHETIST, CERTIFIED REGISTERED

## 2023-03-30 PROCEDURE — 3600000014 HC SURGERY LEVEL 4 ADDTL 15MIN: Performed by: ORTHOPAEDIC SURGERY

## 2023-03-30 PROCEDURE — 3600000004 HC SURGERY LEVEL 4 BASE: Performed by: ORTHOPAEDIC SURGERY

## 2023-03-30 PROCEDURE — 2580000003 HC RX 258: Performed by: NURSE ANESTHETIST, CERTIFIED REGISTERED

## 2023-03-30 PROCEDURE — 3700000001 HC ADD 15 MINUTES (ANESTHESIA): Performed by: ORTHOPAEDIC SURGERY

## 2023-03-30 PROCEDURE — 6360000002 HC RX W HCPCS: Performed by: NURSE ANESTHETIST, CERTIFIED REGISTERED

## 2023-03-30 PROCEDURE — 2500000003 HC RX 250 WO HCPCS: Performed by: ANESTHESIOLOGY

## 2023-03-30 PROCEDURE — 7100000010 HC PHASE II RECOVERY - FIRST 15 MIN: Performed by: ORTHOPAEDIC SURGERY

## 2023-03-30 PROCEDURE — 7100000011 HC PHASE II RECOVERY - ADDTL 15 MIN: Performed by: ORTHOPAEDIC SURGERY

## 2023-03-30 PROCEDURE — 6360000002 HC RX W HCPCS: Performed by: ANESTHESIOLOGY

## 2023-03-30 PROCEDURE — 6360000002 HC RX W HCPCS: Performed by: ORTHOPAEDIC SURGERY

## 2023-03-30 DEVICE — ANCHOR SUT L12.5MM DIA2.9MM SHT SHLDR BIOCOMPOSITE PUSHLOCK: Type: IMPLANTABLE DEVICE | Site: SHOULDER | Status: FUNCTIONAL

## 2023-03-30 RX ORDER — DEXMEDETOMIDINE HYDROCHLORIDE 100 UG/ML
INJECTION, SOLUTION INTRAVENOUS PRN
Status: DISCONTINUED | OUTPATIENT
Start: 2023-03-30 | End: 2023-03-30 | Stop reason: SDUPTHER

## 2023-03-30 RX ORDER — SODIUM CHLORIDE 9 MG/ML
25 INJECTION, SOLUTION INTRAVENOUS PRN
Status: DISCONTINUED | OUTPATIENT
Start: 2023-03-30 | End: 2023-03-30 | Stop reason: HOSPADM

## 2023-03-30 RX ORDER — POVIDONE-IODINE 10 MG/G
OINTMENT TOPICAL
Status: COMPLETED | OUTPATIENT
Start: 2023-03-30 | End: 2023-03-30

## 2023-03-30 RX ORDER — SODIUM CHLORIDE 0.9 % (FLUSH) 0.9 %
5-40 SYRINGE (ML) INJECTION PRN
Status: DISCONTINUED | OUTPATIENT
Start: 2023-03-30 | End: 2023-03-30 | Stop reason: HOSPADM

## 2023-03-30 RX ORDER — PROCHLORPERAZINE EDISYLATE 5 MG/ML
5 INJECTION INTRAMUSCULAR; INTRAVENOUS
Status: DISCONTINUED | OUTPATIENT
Start: 2023-03-30 | End: 2023-03-30 | Stop reason: HOSPADM

## 2023-03-30 RX ORDER — OXYCODONE HYDROCHLORIDE 5 MG/1
5 TABLET ORAL
Status: DISCONTINUED | OUTPATIENT
Start: 2023-03-30 | End: 2023-03-30 | Stop reason: HOSPADM

## 2023-03-30 RX ORDER — SODIUM CHLORIDE 9 MG/ML
INJECTION, SOLUTION INTRAVENOUS PRN
Status: DISCONTINUED | OUTPATIENT
Start: 2023-03-30 | End: 2023-03-30 | Stop reason: HOSPADM

## 2023-03-30 RX ORDER — LIDOCAINE HYDROCHLORIDE 10 MG/ML
0.5 INJECTION, SOLUTION EPIDURAL; INFILTRATION; INTRACAUDAL; PERINEURAL ONCE
Status: DISCONTINUED | OUTPATIENT
Start: 2023-03-30 | End: 2023-03-30 | Stop reason: HOSPADM

## 2023-03-30 RX ORDER — HYDRALAZINE HYDROCHLORIDE 20 MG/ML
10 INJECTION INTRAMUSCULAR; INTRAVENOUS
Status: DISCONTINUED | OUTPATIENT
Start: 2023-03-30 | End: 2023-03-30 | Stop reason: HOSPADM

## 2023-03-30 RX ORDER — SODIUM CHLORIDE 9 MG/ML
INJECTION, SOLUTION INTRAVENOUS CONTINUOUS PRN
Status: DISCONTINUED | OUTPATIENT
Start: 2023-03-30 | End: 2023-03-30 | Stop reason: SDUPTHER

## 2023-03-30 RX ORDER — MIDAZOLAM HYDROCHLORIDE 2 MG/2ML
1 INJECTION, SOLUTION INTRAMUSCULAR; INTRAVENOUS ONCE
Status: COMPLETED | OUTPATIENT
Start: 2023-03-30 | End: 2023-03-30

## 2023-03-30 RX ORDER — FENTANYL CITRATE 50 UG/ML
INJECTION, SOLUTION INTRAMUSCULAR; INTRAVENOUS PRN
Status: DISCONTINUED | OUTPATIENT
Start: 2023-03-30 | End: 2023-03-30 | Stop reason: SDUPTHER

## 2023-03-30 RX ORDER — PROMETHAZINE HYDROCHLORIDE 25 MG/1
25 TABLET ORAL EVERY 6 HOURS PRN
Qty: 5 TABLET | Refills: 0 | Status: SHIPPED | OUTPATIENT
Start: 2023-03-30

## 2023-03-30 RX ORDER — LIDOCAINE HYDROCHLORIDE 20 MG/ML
INJECTION, SOLUTION INFILTRATION; PERINEURAL PRN
Status: DISCONTINUED | OUTPATIENT
Start: 2023-03-30 | End: 2023-03-30 | Stop reason: SDUPTHER

## 2023-03-30 RX ORDER — ROCURONIUM BROMIDE 10 MG/ML
INJECTION, SOLUTION INTRAVENOUS PRN
Status: DISCONTINUED | OUTPATIENT
Start: 2023-03-30 | End: 2023-03-30 | Stop reason: SDUPTHER

## 2023-03-30 RX ORDER — HYDROMORPHONE HCL 110MG/55ML
0.5 PATIENT CONTROLLED ANALGESIA SYRINGE INTRAVENOUS EVERY 5 MIN PRN
Status: DISCONTINUED | OUTPATIENT
Start: 2023-03-30 | End: 2023-03-30 | Stop reason: HOSPADM

## 2023-03-30 RX ORDER — LABETALOL HYDROCHLORIDE 5 MG/ML
10 INJECTION, SOLUTION INTRAVENOUS
Status: DISCONTINUED | OUTPATIENT
Start: 2023-03-30 | End: 2023-03-30 | Stop reason: HOSPADM

## 2023-03-30 RX ORDER — LIDOCAINE HYDROCHLORIDE 10 MG/ML
1 INJECTION, SOLUTION EPIDURAL; INFILTRATION; INTRACAUDAL; PERINEURAL
Status: DISCONTINUED | OUTPATIENT
Start: 2023-03-30 | End: 2023-03-30 | Stop reason: HOSPADM

## 2023-03-30 RX ORDER — SUCCINYLCHOLINE/SOD CL,ISO/PF 200MG/10ML
SYRINGE (ML) INTRAVENOUS PRN
Status: DISCONTINUED | OUTPATIENT
Start: 2023-03-30 | End: 2023-03-30 | Stop reason: SDUPTHER

## 2023-03-30 RX ORDER — ONDANSETRON 2 MG/ML
INJECTION INTRAMUSCULAR; INTRAVENOUS PRN
Status: DISCONTINUED | OUTPATIENT
Start: 2023-03-30 | End: 2023-03-30 | Stop reason: SDUPTHER

## 2023-03-30 RX ORDER — DEXAMETHASONE SODIUM PHOSPHATE 4 MG/ML
INJECTION, SOLUTION INTRA-ARTICULAR; INTRALESIONAL; INTRAMUSCULAR; INTRAVENOUS; SOFT TISSUE PRN
Status: DISCONTINUED | OUTPATIENT
Start: 2023-03-30 | End: 2023-03-30 | Stop reason: SDUPTHER

## 2023-03-30 RX ORDER — SODIUM CHLORIDE 0.9 % (FLUSH) 0.9 %
5-40 SYRINGE (ML) INJECTION EVERY 12 HOURS SCHEDULED
Status: DISCONTINUED | OUTPATIENT
Start: 2023-03-30 | End: 2023-03-30 | Stop reason: HOSPADM

## 2023-03-30 RX ORDER — FENTANYL CITRATE 50 UG/ML
25 INJECTION, SOLUTION INTRAMUSCULAR; INTRAVENOUS EVERY 5 MIN PRN
Status: DISCONTINUED | OUTPATIENT
Start: 2023-03-30 | End: 2023-03-30 | Stop reason: HOSPADM

## 2023-03-30 RX ORDER — PROPOFOL 10 MG/ML
INJECTION, EMULSION INTRAVENOUS PRN
Status: DISCONTINUED | OUTPATIENT
Start: 2023-03-30 | End: 2023-03-30 | Stop reason: SDUPTHER

## 2023-03-30 RX ORDER — FENTANYL CITRATE 50 UG/ML
50 INJECTION, SOLUTION INTRAMUSCULAR; INTRAVENOUS ONCE
Status: COMPLETED | OUTPATIENT
Start: 2023-03-30 | End: 2023-03-30

## 2023-03-30 RX ORDER — SODIUM CHLORIDE, SODIUM LACTATE, POTASSIUM CHLORIDE, CALCIUM CHLORIDE 600; 310; 30; 20 MG/100ML; MG/100ML; MG/100ML; MG/100ML
INJECTION, SOLUTION INTRAVENOUS CONTINUOUS
Status: DISCONTINUED | OUTPATIENT
Start: 2023-03-30 | End: 2023-03-30 | Stop reason: HOSPADM

## 2023-03-30 RX ORDER — BUPIVACAINE HYDROCHLORIDE 5 MG/ML
INJECTION, SOLUTION EPIDURAL; INTRACAUDAL
Status: COMPLETED | OUTPATIENT
Start: 2023-03-30 | End: 2023-03-30

## 2023-03-30 RX ORDER — MAGNESIUM SULFATE HEPTAHYDRATE 500 MG/ML
INJECTION, SOLUTION INTRAMUSCULAR; INTRAVENOUS PRN
Status: DISCONTINUED | OUTPATIENT
Start: 2023-03-30 | End: 2023-03-30 | Stop reason: SDUPTHER

## 2023-03-30 RX ORDER — ONDANSETRON 2 MG/ML
4 INJECTION INTRAMUSCULAR; INTRAVENOUS
Status: DISCONTINUED | OUTPATIENT
Start: 2023-03-30 | End: 2023-03-30 | Stop reason: HOSPADM

## 2023-03-30 RX ORDER — OXYCODONE HYDROCHLORIDE AND ACETAMINOPHEN 5; 325 MG/1; MG/1
1 TABLET ORAL EVERY 4 HOURS PRN
Qty: 40 TABLET | Refills: 0 | Status: SHIPPED | OUTPATIENT
Start: 2023-03-30 | End: 2023-04-06

## 2023-03-30 RX ADMIN — FENTANYL CITRATE 100 MCG: 50 INJECTION, SOLUTION INTRAMUSCULAR; INTRAVENOUS at 14:01

## 2023-03-30 RX ADMIN — PROPOFOL 200 MG: 10 INJECTION, EMULSION INTRAVENOUS at 14:04

## 2023-03-30 RX ADMIN — DEXAMETHASONE SODIUM PHOSPHATE 10 MG: 4 INJECTION, SOLUTION INTRAMUSCULAR; INTRAVENOUS at 14:12

## 2023-03-30 RX ADMIN — SUGAMMADEX 200 MG: 100 INJECTION, SOLUTION INTRAVENOUS at 15:08

## 2023-03-30 RX ADMIN — LIDOCAINE HYDROCHLORIDE 100 MG: 20 INJECTION, SOLUTION INFILTRATION; PERINEURAL at 14:03

## 2023-03-30 RX ADMIN — DEXMEDETOMIDINE HYDROCHLORIDE 4 MCG: 100 INJECTION, SOLUTION INTRAVENOUS at 15:03

## 2023-03-30 RX ADMIN — BUPIVACAINE HYDROCHLORIDE 27 ML: 5 INJECTION, SOLUTION EPIDURAL; INTRACAUDAL at 12:55

## 2023-03-30 RX ADMIN — CEFAZOLIN 2000 MG: 2 INJECTION, POWDER, FOR SOLUTION INTRAMUSCULAR; INTRAVENOUS at 13:58

## 2023-03-30 RX ADMIN — ROCURONIUM BROMIDE 40 MG: 10 INJECTION INTRAVENOUS at 14:12

## 2023-03-30 RX ADMIN — SODIUM CHLORIDE, POTASSIUM CHLORIDE, SODIUM LACTATE AND CALCIUM CHLORIDE: 600; 310; 30; 20 INJECTION, SOLUTION INTRAVENOUS at 12:45

## 2023-03-30 RX ADMIN — FENTANYL CITRATE 50 MCG: 50 INJECTION, SOLUTION INTRAMUSCULAR; INTRAVENOUS at 12:48

## 2023-03-30 RX ADMIN — MIDAZOLAM 1 MG: 1 INJECTION INTRAMUSCULAR; INTRAVENOUS at 12:48

## 2023-03-30 RX ADMIN — SODIUM CHLORIDE: 9 INJECTION, SOLUTION INTRAVENOUS at 12:51

## 2023-03-30 RX ADMIN — DEXMEDETOMIDINE HYDROCHLORIDE 4 MCG: 100 INJECTION, SOLUTION INTRAVENOUS at 14:19

## 2023-03-30 RX ADMIN — MAGNESIUM SULFATE HEPTAHYDRATE 1 G: 500 INJECTION, SOLUTION INTRAMUSCULAR; INTRAVENOUS at 14:01

## 2023-03-30 RX ADMIN — Medication 200 MG: at 14:04

## 2023-03-30 RX ADMIN — DEXMEDETOMIDINE HYDROCHLORIDE 4 MCG: 100 INJECTION, SOLUTION INTRAVENOUS at 14:57

## 2023-03-30 RX ADMIN — DEXMEDETOMIDINE HYDROCHLORIDE 4 MCG: 100 INJECTION, SOLUTION INTRAVENOUS at 14:52

## 2023-03-30 RX ADMIN — ONDANSETRON 4 MG: 2 INJECTION INTRAMUSCULAR; INTRAVENOUS at 14:12

## 2023-03-30 RX ADMIN — DEXMEDETOMIDINE HYDROCHLORIDE 4 MCG: 100 INJECTION, SOLUTION INTRAVENOUS at 14:29

## 2023-03-30 RX ADMIN — ROCURONIUM BROMIDE 10 MG: 10 INJECTION INTRAVENOUS at 14:04

## 2023-03-30 ASSESSMENT — ENCOUNTER SYMPTOMS: SHORTNESS OF BREATH: 0

## 2023-03-30 ASSESSMENT — PAIN DESCRIPTION - DESCRIPTORS: DESCRIPTORS: ACHING

## 2023-03-30 ASSESSMENT — PAIN DESCRIPTION - LOCATION
LOCATION: SHOULDER
LOCATION: SHOULDER

## 2023-03-30 ASSESSMENT — PAIN SCALES - GENERAL
PAINLEVEL_OUTOF10: 2
PAINLEVEL_OUTOF10: 3

## 2023-03-30 ASSESSMENT — PAIN DESCRIPTION - ORIENTATION
ORIENTATION: RIGHT
ORIENTATION: RIGHT

## 2023-03-30 ASSESSMENT — PAIN DESCRIPTION - PAIN TYPE: TYPE: ACUTE PAIN

## 2023-03-30 NOTE — ANESTHESIA PRE PROCEDURE
hours.    Coags: No results found for: PROTIME, INR, APTT    HCG (If Applicable): No results found for: PREGTESTUR, PREGSERUM, HCG, HCGQUANT     ABGs: No results found for: PHART, PO2ART, IFH1YYY, JFL3SCM, BEART, N0GBCHHX     Type & Screen (If Applicable):  No results found for: LABABO, LABRH    Drug/Infectious Status (If Applicable):  No results found for: HIV, HEPCAB    COVID-19 Screening (If Applicable): No results found for: COVID19        Anesthesia Evaluation  Patient summary reviewed and Nursing notes reviewed no history of anesthetic complications:   Airway: Mallampati: I  TM distance: >3 FB   Neck ROM: full  Mouth opening: > = 3 FB   Dental: normal exam         Pulmonary:       (-) asthma and shortness of breath                           Cardiovascular:        (-) hypertension and  angina                Neuro/Psych:   (+) psychiatric history:depression/anxiety    (-) CVA           GI/Hepatic/Renal:   (+) renal disease: kidney stones,      (-) GERD and liver disease       Endo/Other:        (-) diabetes mellitus, hypothyroidism               Abdominal:             Vascular:     - PVD. Other Findings:           Anesthesia Plan      general     ASA 2       Induction: intravenous. MIPS: Postoperative opioids intended and Prophylactic antiemetics administered. Anesthetic plan and risks discussed with patient. Use of blood products discussed with patient whom. Plan discussed with CRNA.                     Jaiden Masterson MD   3/30/2023

## 2023-03-30 NOTE — ANESTHESIA PROCEDURE NOTES
Peripheral Block    Patient location during procedure: pre-op  Reason for block: post-op pain management and at surgeon's request  Start time: 3/30/2023 12:55 PM  End time: 3/30/2023 1:00 PM  Staffing  Performed: anesthesiologist   Anesthesiologist: Annalisa Givens MD  Preanesthetic Checklist  Completed: patient identified, IV checked, site marked, risks and benefits discussed, surgical/procedural consents, equipment checked, pre-op evaluation, timeout performed, anesthesia consent given, oxygen available and monitors applied/VS acknowledged  Peripheral Block   Patient position: sitting  Prep: ChloraPrep  Provider prep: mask and sterile gloves  Patient monitoring: cardiac monitor, continuous pulse ox, frequent blood pressure checks and IV access  Block type: Brachial plexus  Interscalene  Laterality: right  Injection technique: single-shot  Guidance: nerve stimulator and ultrasound guided  Local infiltration: lidocaine  Infiltration strength: 1 %  Local infiltration: lidocaine  Dose: 3 mL    Needle   Needle type: short-bevel   Needle gauge: 22 G  Needle localization: ultrasound guidance and nerve stimulator  Needle length: 10 cm  Assessment   Injection assessment: negative aspiration for heme, no paresthesia on injection and local visualized surrounding nerve on ultrasound  Paresthesia pain: none  Slow fractionated injection: yes  Hemodynamics: stable  Real-time US image taken/store: yes  Outcomes: uncomplicated and patient tolerated procedure well    Additional Notes  U/S 20150. (1) Under ultrasound guidance, a 22 gauge needle was inserted and placed in close proximity to the BP nerve. (2) Ultrasound was also used to visualize the spread of the anesthetic in close proximity to the nerve being blocked. (3) The nerve appeared anatomically normal, and (4 there were no apparent abnormal pathological findings on the image that were readily visible and related to the nerve being blocked.  (5) A permanent

## 2023-03-30 NOTE — PROGRESS NOTES
Block completed. Pt tolerated well. Post procedure VS= 139/96  O2 sat remained % throughout .  P- 69
Discharge instructions reviewed with patient/mother Lilian Do. All home medications have been reviewed, questions answered and patient verbalized understanding. Discharge instructions signed. Pt dc'd per wheelchair. Patient discharged home with two prescriptions, sling/abductor pillow and other belongings. Mother and sister taking stable pt home.
Monitors applied. Baseline VS= 148/108  P= 67  O2 sat= 99% with 2L via NC  Time out completed per protocol prior to right interscalene block.   Medicated with 2 mg Versed and 50mcg Fentanyl as ordered by anesthesia
Patient arrived from OR to PACU. On 4 L n/c. NSR on the monitor. VSS. Drsgs to right shoulder CDI, ice applied.
Pt awake and alert. Pt on RA, VSS. Mom and sister in the waiting room. Pt denies pain and nausea, tolerating PO. Skin warm RUE, palpable pulses and able to slightly wiggle fingers. Pt meets criteria to be discharged from phase 1.
Teaching / education initiated regarding perioperative experience, expectations, and pain management during stay. Patient verbalized understanding.
Teaching / education initiated regarding perioperative experience, expectations, and pain management during stay. Patient verbalized understanding.
alone or drive yourself home. It is strongly suggested someone stay with you the first 24 hrs. Your surgery will be cancelled if you do not have a ride home. 8. A parent/legal guardian must accompany a child scheduled for surgery and plan to stay at the hospital until the child is discharged. Please do not bring other children with you. 9. Please wear simple, loose fitting clothing to the hospital.  Lorenza Syed not bring valuables (money, credit cards, checkbooks, etc.) Do not wear any makeup (including no eye makeup) or nail polish on your fingers or toes. 10. DO NOT wear any jewelry or piercings on day of surgery. All body piercing jewelry must be removed. 11. If you have ___dentures, they will be removed before going to the OR; we will provide you a container. If you wear ___contact lenses or ___glasses, they will be removed; please bring a case for them. 12. Please see your family doctor/pediatrician for a history & physical and/or concerning medications. Bring any test results/reports from your physician's office. PCP__________________Phone___________H&P Appt. Date________             13 If you  have a Living Will and Durable Power of  for Healthcare, please bring in a copy. 15. Notify your Surgeon if you develop any illness between now and surgery  time, cough, cold, fever, sore throat, nausea, vomiting, etc.  Please notify your surgeon if you experience dizziness, shortness of breath or blurred vision between now & the time of your surgery             15. DO NOT shave your operative site 96 hours prior to surgery. For face & neck surgery, men may use an electric razor 48 hours prior to surgery. 16. Shower the night before or morning of surgery using an antibacterial soap or as you have been instructed. 17. To provide excellent care visitors will be limited to one in the room at any given time.              18.  Please bring

## 2023-03-30 NOTE — BRIEF OP NOTE
Brief Postoperative Note      Patient: Fortunato Montoya  YOB: 1990  MRN: [id number]    Date of Procedure: 3/30/2023    Pre-Op Diagnosis: Right shoulder labral tear    Post-Op Diagnosis: Right shoulder anterior labral tear, partial rotator cuff tear,   chondral flap of glenoid       Procedure(s):  RIGHT SHOULDER ARTHROSCOPY, LABRAL REPAIR, ROTATOR CUFF DEBRIDEMENT, CHONDROPLASTY GLENOID    Surgeon(s):  Manda Villalba MD    Assistant:  First Assistant: Yusuf Encarnacion    Anesthesia: General and block    Estimated Blood Loss (mL): Minimal    Complications: None    Specimens:   * No specimens in log *    Implants:  Implant Name Type Inv. Item Serial No.  Lot No. LRB No. Used Action   ANCHOR SUT L12.5MM DIA2. 9MM SHT SHLDR [employer] - [id number]  ANCHOR SUT L12.5MM DIA2. 9MM SHT SHLDR [employer]  [employer] [id number] Right 1 Implanted   ANCHOR SUT L12.5MM DIA2. 9MM SHT SHLDR [employer] - [id number]  ANCHOR SUT L12.5MM DIA2. 9MM SHT SHLDR [employer] [id number] Right 1 Implanted             Electronically signed by Manda Villalba MD on 3/30/2023 at 3:09 PM

## 2023-03-30 NOTE — OP NOTE
Malik Burton (1990)    Date of Surgery- 3/30/2023      Preoperative Diagnosis:  Right shoulder anterior labral tear                                           Postoperative Diagnosis:  Right shoulder anterior labral tear, partial articular-sided supraspinatus tendon tear, chondral flap glenoid    Procedure:  Right shoulder Diagnostic arthroscopy, anterior labral repair, rotator cuff debridement, chondroplasty glenoid      Surgeon: William Hurley. Lakshmi Sorto MD    Surgical Assistant: Elise Reid    Anesthesia: General and interscalene nerve block    Estimated blood loss:  minimal    Complications: none    Disposition: To PACU in good condition      Indications for Procedure  Abhinav Bermeo is a 33 y.o. male who was seen in the office for right shoulder pain. He was sustained a volleyball injury when he  and landed on his shoulder. He had MRI of his shoulder which demonstrated anterior labral tear. The radiologist believes that there was a small perforating full-thickness tear of the supraspinatus tendon. We discussed operative and nonoperative treatment options and recommended surgical intervention. We discussed the risks, benefits, complications, and alternatives of the  procedure. The patient chose to proceed with the procedure. The patient subsequently provided written informed consent for the procedure. At no time were any guarantees implied or stated. Site Marking and Surgical Prep  The patient was seen in the preoperative holding area and the appropriate extremity marked. Interscalene block was administered by the anesthesia department. The patient was taken to the operating room, identified, and transferred onto the operating room table in the supine position. The patient was then induced under general anesthesia.   Patient received prophylactic preoperative IV antibiotics and had DVT prophylaxis with sequential compression devices on the bilateral lower extremities    Diagnostic

## 2023-03-30 NOTE — ANESTHESIA POSTPROCEDURE EVALUATION
Department of Anesthesiology  Postprocedure Note    Patient: Kermit Schafer  MRN: [id number]  YOB: 1990  Date of evaluation: 3/30/2023      Procedure Summary     Date: 03/30/23 Room / Location: [address]    Anesthesia Start: 1400 Anesthesia Stop: 7227    Procedure: RIGHT SHOULDER ARTHROSCOPY, LABRAL REPAIR, ROTATOR CUFF DEBRIDEMENT-BLOCK; [employer] (Right: Shoulder) Diagnosis:       Labral tear of shoulder, right, initial encounter      (Labral tear of shoulder, right, initial encounter [R86.810J])    Surgeons: Marina Reyna MD Responsible Provider:     Anesthesia Type: general ASA Status: 2          Anesthesia Type: No value filed.     Bronson Phase I: Bronson Score: 8    Bronson Phase II:        Anesthesia Post Evaluation    Patient location during evaluation: PACU  Patient participation: complete - patient participated  Level of consciousness: awake  Airway patency: patent  Nausea & Vomiting: no vomiting  Complications: no  Cardiovascular status: hemodynamically stable  Respiratory status: acceptable  Hydration status: euvolemic  Multimodal analgesia pain management approach

## 2023-03-31 ENCOUNTER — OFFICE VISIT (OUTPATIENT)
Dept: ORTHOPEDIC SURGERY | Age: 33
End: 2023-03-31

## 2023-03-31 VITALS — WEIGHT: 215 LBS | BODY MASS INDEX: 29.12 KG/M2 | RESPIRATION RATE: 16 BRPM | HEIGHT: 72 IN

## 2023-03-31 DIAGNOSIS — S43.431A LABRAL TEAR OF SHOULDER, RIGHT, INITIAL ENCOUNTER: Primary | ICD-10-CM

## 2023-03-31 PROCEDURE — 99024 POSTOP FOLLOW-UP VISIT: CPT | Performed by: STUDENT IN AN ORGANIZED HEALTH CARE EDUCATION/TRAINING PROGRAM

## 2023-03-31 NOTE — LETTER
Dressing Change Instructions:       Please change your dressing every other day. You will only need to change the gauze and Tegaderm each time.  You may let water run over the dressing in the shower. Do NOT submerge or soak your incision(s).  Once you are out of bandage change supplies, you may cover your incision(s) with a band-aid to keep the sutures from catching on clothing. You may let your incisions get wet in the shower; continue to refrain from submerging or soaking your incision(s).  Supplies may be trimmed to fit your bandage size. The entirety of the gauze should be covered with the Tegaderm. If it is not covered, it will hold water and keep the area moist around the incision(s) - which could cause infection or delay healing.  Do not submerge or soak your incision until cleared by the provider.
Detail Level: Detailed
Detail Level: Zone

## 2023-03-31 NOTE — PROGRESS NOTES
Shoulder Arthroscopy Follow-up  Taurus Huddleston is here for follow up after right shoulder arthroscopic surgery. Surgery date was 3/30/23. Findings at surgery: Right shoulder anterior labral tear, partial articular-sided supraspinatus tendon tear, chondral flap glenoid. Pain is controlled with current analgesics. Medication(s) being used: percocet. The patient's pain is rated at 6/10. The patient denies fever, wound drainage, increasing redness, pus, increasing swelling. Post op problems reported: none. He has been in a sling and non-weightbearing as instructed. Physical Examination:  There were no vitals filed for this visit. Patient is awake, alert, and in no acute distress. The incisions are clean, dry, no drainage. There is no warmth, erythema, or purulent drainage over the incisions. Sensation is intact to light touch in the axillary, median, radial, and ulnar nerve distribution bilaterally. The EPL, FPL, and interossei are grossly intact bilaterally. The Bilateral upper extremities are warm and well-perfused with brisk capillary refill. Assessment:   1 day status post right shoulder arthroscopy, anterior labral repair, rotator cuff debridement, chondroplasty glenoid      Plan:   Start physical therapy. A physical therapy order was placed and postoperative physical therapy protocol was provided to the patient to give to the physical therapist.    The patient may not advance their weight-bearing. Sling for 4 weeks total after surgery. Elbow ROM when out of sling. The patient should use the cold pad or apply ice to the shoulder continuously for 3 days after surgery. Then use cold pad or ice 20-30 minutes only 3-5 times per day as needed. Refill pain medications as needed. Judicious use NSAIDs. Patient may start showering now. No baths or soaks. Can leave open to air or apply band-aids to the incisions once out of Tegaderm.         No driving while on pain medication if it

## 2023-04-04 ENCOUNTER — HOSPITAL ENCOUNTER (OUTPATIENT)
Dept: PHYSICAL THERAPY | Age: 33
Setting detail: THERAPIES SERIES
Discharge: HOME | End: 2023-04-04
Payer: MEDICAID

## 2023-04-04 PROCEDURE — 97161 PT EVAL LOW COMPLEX 20 MIN: CPT

## 2023-04-04 PROCEDURE — 97530 THERAPEUTIC ACTIVITIES: CPT

## 2023-04-04 PROCEDURE — 97110 THERAPEUTIC EXERCISES: CPT

## 2023-04-04 NOTE — PROGRESS NOTES
presentation with:  [x] stable and/or uncomplicated characteristics   [] evolving clinical presentation with changing characteristics  [] unstable and unpredictable characteristics;   [x] Clinical decision making of [x] low, [] moderate, [] high complexity using standardized patient assessment instrument and/or measurable assessment of functional outcome. [x] EVAL (LOW) 53956 (typically 15 minutes face-to-face)  [] EVAL (MOD) 65321 (typically 30 minutes face-to-face)  [] EVAL (HIGH) 03963 (typically 45 minutes face-to-face)  [] RE-EVAL     PLAN:  Frequency/Duration:  2 days per week for 6 Weeks: (next visit POD 14 to begin ROM)  INTERVENTIONS:  [x] Therapeutic exercise including: strength training, ROM, for Upper extremity and core   [x]  NMR activation and proprioception for UE, scap and Core   [x] Manual therapy as indicated for shoulder, scapula and spine to include: Dry Needling/IASTM, STM, PROM, Gr I-IV mobilizations, manipulation. [x] Modalities as needed that may include: thermal agents, E-stim, Biofeedback, US, iontophoresis as indicated  [x] Patient education on joint protection, postural re-education, activity modification, progression of HEP. HEP instruction: Written HEP instructions provided and reviewed   Access Code: [email or web address]  URL: [email or web address]  Date: 04/05/2023  Prepared by: Kamlesh Foreman    Exercises  - Circular Shoulder Pendulum with Table Support  - 1 x daily - 7 x weekly - 3 sets - 10 reps  - Flexion-Extension Shoulder Pendulum with Table Support  - 1 x daily - 7 x weekly - 3 sets - 10 reps  - Horizontal Shoulder Pendulum with Table Support  - 1 x daily - 7 x weekly - 3 sets - 10 reps  - Seated Elbow Flexion and Extension AROM  - 1 x daily - 7 x weekly - 3 sets - 10 reps    GOALS:  Patient stated goal: to resume normal activities  [] Progressing: [] Met: [] Not Met: [] Adjusted    Therapist goals for Patient:   Short Term Goals: To be achieved in: 2 weeks  1.  Independent in

## 2023-04-05 NOTE — FLOWSHEET NOTE
Progression has been slowed due to co-morbidities. [x] Plan just implemented, too soon to assess goals progression <30days   [] Goals require adjustment due to lack of progress  [] Patient is not progressing as expected and requires additional follow up with physician  [] Other    Persisting Functional Limitations/Impairments:  [x]Sleeping []Sitting               []Standing []Transfers        []Walking []Kneeling               []Stairs []Squatting / bending   [x]ADLs [x]Reaching  [x]Lifting  [x]Housework  [x]Driving [x]Job related tasks  [x]Sports/Recreation []Other:        ASSESSMENT:  Pt progressing well post SLAP repair; limited until 14 days post op in regards to ROM. Will have pt return at that time to begin therapy. Taught pendulum exercises and scapular retraction this date and reinforced daily hand, wrist, and elbow ROM. Pt's pain is well controlled and he should progress very well once motion and strengthening are initiated. Treatment/Activity Tolerance:  [x] Patient able to complete tx [] Patient limited by fatigue  [] Patient limited by pain  [] Patient limited by other medical complications  [] Other:     Prognosis: [x] Good [] Fair  [] Poor    Patient Requires Follow-up: [x] Yes  [] No    Plan for next treatment session: Begin and progress per protocol as tolerated. PLAN: See eval. PT 2x / week for 6 weeks. [] Continue per plan of care [] Alter current plan (see comments)  [x] Plan of care initiated [] Hold pending MD visit [] Discharge    Electronically signed by: Carlos Hernandez, PT, DPT    Note: If patient does not return for scheduled/ recommended follow up visits, this note will serve as a discharge from care along with most recent update on progress.

## 2023-04-17 ENCOUNTER — HOSPITAL ENCOUNTER (OUTPATIENT)
Dept: PHYSICAL THERAPY | Age: 33
Setting detail: THERAPIES SERIES
Discharge: HOME | End: 2023-04-17
Payer: MEDICAID

## 2023-04-17 NOTE — PROGRESS NOTES
[address]     Physical Therapy  Cancellation/No-show Note  Patient Name:  Bossman Arreaga  :  1990   Date:  2023  Cancelled visits to date: 0  No-shows to date: 1    Patient status for today's appointment patient:  []  Cancelled  []  Rescheduled appointment  [x]  No-show      Reason given by patient:  []  Patient ill  []  Conflicting appointment  []  No transportation    []  Conflict with work  [x]  No reason given  []  Other:     Comments:      Phone call information:   [x]  Phone call made today to patient at 9:37a time at number provided:      []  Patient answered, conversation as follows:    [x]  Patient did not answer, message left as follows: Called patient to inform him that he missed his appointment today, reminded of appointment  at 10:00 am and if he has to cancel to let us know ahead of time at [phone]. []  Phone call not made today  []  Phone call not needed - pt contacted us to cancel and provided reason for cancellation. Electronically signed by:  Cindy Smith, PT    Therapist was present, directed the patient's care, made skilled judgement, and was responsible for assessment and treatment of the patient.       [employer], SPT

## 2023-04-18 ENCOUNTER — OFFICE VISIT (OUTPATIENT)
Dept: ORTHOPEDIC SURGERY | Age: 33
End: 2023-04-18

## 2023-04-18 VITALS — WEIGHT: 216 LBS | BODY MASS INDEX: 29.26 KG/M2 | HEIGHT: 72 IN | RESPIRATION RATE: 16 BRPM

## 2023-04-18 DIAGNOSIS — S43.431A LABRAL TEAR OF SHOULDER, RIGHT, INITIAL ENCOUNTER: Primary | ICD-10-CM

## 2023-04-18 PROCEDURE — 99024 POSTOP FOLLOW-UP VISIT: CPT | Performed by: ORTHOPAEDIC SURGERY

## 2023-04-18 NOTE — PROGRESS NOTES
Shoulder Arthroscopy Follow-up  Taurus Hartmann is here for follow up after right shoulder arthroscopic surgery. Surgery date was 3/30/23. Findings at surgery: Right shoulder anterior labral tear, partial articular-sided supraspinatus tendon tear, chondral flap glenoid. Pain is controlled with current analgesics. Medication(s) being used: percocet. The patient's pain is rated at 3/10. He has been in a sling and non-weightbearing as instructed. He has been to PT at the [place] office. Physical Examination:  Resp 16   Ht 6' (1.829 m)   Wt 216 lb (98 kg)   BMI 29.29 kg/m²    Patient is awake, alert, and in no acute distress. The incisions are healing. Assessment:   2 weeks status post right shoulder arthroscopy, anterior labral repair, rotator cuff debridement, chondroplasty glenoid      Plan:   Sutures were removed. Steri-strips and mastisol were applied. Patient may start taking baths or soaks at 4 weeks postop    Continue physical therapy. The patient may not advance their weight-bearing. Sling for 4 weeks total after surgery. Refill pain medications as needed. OTC NSAIDs as needed. Ice as needed. Return to office at the 6 week postop time period for evaluation of progress or prn if problems. Jasvir Peace. Rodney Vernon MD  Orthopaedic Surgery and Sports Medicine     Disclaimer: This note was generated with use of a verbal recognition program (DRAGON) and an attempt was made to check for errors. It is possible that there are still dictated errors within this office note. If so, please bring any significant errors to my attention for an addendum. All efforts were made to ensure that this office note is accurate.

## 2023-04-19 ENCOUNTER — HOSPITAL ENCOUNTER (OUTPATIENT)
Dept: PHYSICAL THERAPY | Age: 33
Setting detail: THERAPIES SERIES
Discharge: HOME | End: 2023-04-19
Payer: MEDICAID

## 2023-04-19 PROCEDURE — 97110 THERAPEUTIC EXERCISES: CPT

## 2023-04-19 PROCEDURE — 97140 MANUAL THERAPY 1/> REGIONS: CPT

## 2023-04-19 PROCEDURE — 97016 VASOPNEUMATIC DEVICE THERAPY: CPT

## 2023-04-19 NOTE — FLOWSHEET NOTE
points to reduce disability and progress towards PLOF. [] Progressing: [] Met: [] Not Met: [] Adjusted  2. Patient will demonstrate increased AROM R UE equal to L to allow for proper joint functioning as indicated by patients Functional Deficits. [] Progressing: [] Met: [] Not Met: [] Adjusted  3. Patient will demonstrate an increase in Strength to at least 4/5 throughout LUE to allow for proper functional mobility as indicated by patients Functional Deficits. [] Progressing: [] Met: [] Not Met: [] Adjusted  4. Patient will return to functional activities including all self care and household activties without increased symptoms or restriction. [] Progressing: [] Met: [] Not Met: [] Adjusted    Overall Progression Towards Functional goals/ Treatment Progress Update:  [] Patient is progressing as expected towards functional goals listed. [] Progression is slowed due to complexities/Impairments listed. [] Progression has been slowed due to co-morbidities. [x] Plan just implemented, too soon to assess goals progression <30days   [] Goals require adjustment due to lack of progress  [] Patient is not progressing as expected and requires additional follow up with physician  [] Other    Persisting Functional Limitations/Impairments:  [x]Sleeping []Sitting               []Standing []Transfers        []Walking []Kneeling               []Stairs []Squatting / bending   [x]ADLs [x]Reaching  [x]Lifting  [x]Housework  [x]Driving [x]Job related tasks  [x]Sports/Recreation []Other:        ASSESSMENT:  Pt progressing well post SLAP repair despite reports of fall at home. Tolerated PROM and initiation of isometrics well this date. Pt initially had difficulty relaxing for PROM however once able to relax pt was able to achieve nearly full flexion, abduction and IR. ER still limited to 30 degrees per MD.  Will progress through protocol as tolerated.            Treatment/Activity Tolerance:  [x] Patient able to complete

## 2023-04-24 ENCOUNTER — HOSPITAL ENCOUNTER (OUTPATIENT)
Dept: PHYSICAL THERAPY | Age: 33
Setting detail: THERAPIES SERIES
Discharge: HOME | End: 2023-04-24
Payer: MEDICAID

## 2023-04-24 PROCEDURE — 97112 NEUROMUSCULAR REEDUCATION: CPT

## 2023-04-24 PROCEDURE — 97140 MANUAL THERAPY 1/> REGIONS: CPT

## 2023-04-24 PROCEDURE — 97110 THERAPEUTIC EXERCISES: CPT

## 2023-04-24 NOTE — FLOWSHEET NOTE
[address] [facility] Physical Therapy  Phone: [phone]   Fax: [phone]    Physical Therapy Daily Treatment Note    Date:  2023     Patient Name:  Lencho Alvarado    :  1990  MRN: [id number]  Medical Diagnosis:  Labral tear of shoulder, right, initial encounter [S43.431A]  Treatment Diagnosis: limited function post SLAP repair R shoulder  Insurance/Certification information:  PT Insurance Information: medicaid - 30 visits per year; auth after visit #30  Physician Information:  HERNANDO Macias   Plan of care signed (Y/N): [x]  Yes []  No     Date of Patient follow up with Physician:      Progress Report: []  Yes   [x]  No     Date Range for reporting period:  Beginnin2023  Ending:     Progress report due (10 Rx/or 30 days whichever is less): visit #10 or 2/3/98      Recertification due (POC duration/ or 90 days whichever is less): visit #12       Visit # Insurance Allowable Auth required? Date Range   3/12 30 []  Yes  [x]  No      Latex Allergy:  [x]NO      []YES  Preferred Language for Healthcare:   [x]English       []other:    Functional Scale:       Date assessed:  Quick dash: raw score = 37; dysfunction = 59%  23    Pain level:  510     SUBJECTIVE:  Pt reports to clinic with fall previous week explaining that R arm was used to catch rail during fall on the stairs last week. Pain has increased since then and feeling tighter in shoulder. OBJECTIVE:   - 148 PROM shoulder flexion  : Pt wearing sling into clinic; able to perform pendulum exercises and wrist/elbow ROM well without difficulty. Demonstrating fair posture; able to correct when cued. RESTRICTIONS/PRECAUTIONS:  Surgery 3/30/23.   SLAP Repair   Post-Operative Therapy Protocol      Sling (except during PT and washing):   Sling x4 weeks at all times      Motion:   POD 1:   Pendulum exercises, elbow/wrist/hand ROM  POD 14:   Supine PROM: forward flexion to goal 180 as

## 2023-04-25 DIAGNOSIS — F90.0 ATTENTION DEFICIT HYPERACTIVITY DISORDER (ADHD), PREDOMINANTLY INATTENTIVE TYPE: ICD-10-CM

## 2023-04-26 ENCOUNTER — TELEPHONE (OUTPATIENT)
Dept: INTERNAL MEDICINE CLINIC | Age: 33
End: 2023-04-26

## 2023-04-26 ENCOUNTER — HOSPITAL ENCOUNTER (OUTPATIENT)
Dept: PHYSICAL THERAPY | Age: 33
Setting detail: THERAPIES SERIES
Discharge: HOME | End: 2023-04-26
Payer: MEDICAID

## 2023-04-26 PROCEDURE — 97110 THERAPEUTIC EXERCISES: CPT

## 2023-04-26 PROCEDURE — 97112 NEUROMUSCULAR REEDUCATION: CPT

## 2023-04-26 PROCEDURE — 97140 MANUAL THERAPY 1/> REGIONS: CPT

## 2023-04-26 RX ORDER — DEXTROAMPHETAMINE SACCHARATE, AMPHETAMINE ASPARTATE MONOHYDRATE, DEXTROAMPHETAMINE SULFATE AND AMPHETAMINE SULFATE 5; 5; 5; 5 MG/1; MG/1; MG/1; MG/1
20 CAPSULE, EXTENDED RELEASE ORAL EVERY MORNING
Qty: 30 CAPSULE | Refills: 0 | Status: SHIPPED | OUTPATIENT
Start: 2023-04-26 | End: 2023-05-18 | Stop reason: SDUPTHER

## 2023-04-26 NOTE — FLOWSHEET NOTE
[address] [facility] Physical Therapy  Phone: [phone]   Fax: [phone]    Physical Therapy Daily Treatment Note    Date:  2023     Patient Name:  Acosta Marie    :  1990  MRN: [id number]  Medical Diagnosis:  Labral tear of shoulder, right, initial encounter [S43.431Q]  Treatment Diagnosis: limited function post SLAP repair R shoulder  Insurance/Certification information:  PT Insurance Information: medicaid - 30 visits per year; auth after visit #30  Physician Information:  Edger Face, PA   Plan of care signed (Y/N): [x]  Yes []  No     Date of Patient follow up with Physician:      Progress Report: []  Yes   [x]  No     Date Range for reporting period:  Beginnin2023  Ending:     Progress report due (10 Rx/or 30 days whichever is less): visit #10 or 51      Recertification due (POC duration/ or 90 days whichever is less): visit #12       Visit # Insurance Allowable Auth required? Date Range    30 []  Yes  [x]  No      Latex Allergy:  [x]NO      []YES  Preferred Language for Healthcare:   [x]English       []other:    Functional Scale:       Date assessed:  Quick dash: raw score = 37; dysfunction = 59%  23    Pain level:  0/10     SUBJECTIVE:  :  Pt reports no pain this date; states he has tightness at times but overall feels he is doing well. RTD:     OBJECTIVE: : Pt to clinic in sling; pt 28 days post op today. - 148 PROM shoulder flexion  : Pt wearing sling into clinic; able to perform pendulum exercises and wrist/elbow ROM well without difficulty. Demonstrating fair posture; able to correct when cued. RESTRICTIONS/PRECAUTIONS:  Surgery 3/30/23.   SLAP Repair   Post-Operative Therapy Protocol      Sling (except during PT and washing):   Sling x4 weeks at all times      Motion:   POD 1:   Pendulum exercises, elbow/wrist/hand ROM  POD 14:   Supine PROM: forward flexion to goal 180 as comfort allows  ER to

## 2023-04-26 NOTE — TELEPHONE ENCOUNTER
[facility] calling to clarify that Adderall XR was suppose to be sent to [facility]. I called the patient and the patient states he has been living in [place] with family for a few weeks and he will be out of his Adderall XR soon which is why he will need it filled in [place] instead of [place] and he is unable to make it back to [place] to pick his script up at the pharmacy he usually fills the script at.

## 2023-05-03 ENCOUNTER — HOSPITAL ENCOUNTER (OUTPATIENT)
Dept: PHYSICAL THERAPY | Age: 33
Setting detail: THERAPIES SERIES
Discharge: HOME | End: 2023-05-03

## 2023-05-03 PROCEDURE — 97016 VASOPNEUMATIC DEVICE THERAPY: CPT

## 2023-05-03 PROCEDURE — 97530 THERAPEUTIC ACTIVITIES: CPT

## 2023-05-03 PROCEDURE — 97110 THERAPEUTIC EXERCISES: CPT

## 2023-05-03 NOTE — FLOWSHEET NOTE
[address] [facility] Physical Therapy  Phone: [phone]   Fax: [phone]    Physical Therapy Re-Certification Plan of Care    Dear Zenon Busby  ,    We had the pleasure of treating the following patient for physical therapy services at [facility]. A summary of our findings can be found in the updated assessment below. This includes our plan of care. If you have any questions or concerns regarding these findings, please do not hesitate to contact me at the office phone number checked above. Thank you for the referral.     Physician Signature:________________________________Date:__________________  By signing above (or electronic signature), therapist's plan is approved by physician               Overall Response to Treatment:   [x]Patient is responding well to treatment and improvement is noted with regards  to goals   []Patient should continue to improve in reasonable time if they continue HEP   []Patient has plateaued and is no longer responding to skilled PT intervention    []Patient is getting worse and would benefit from return to referring MD   []Patient unable to adhere to initial POC   []Other:     Date range of Visits: 23 - 5/3/23  Total Visits: 5    Recommendation:    [x]Continue PT 2x / wk for 4-6 weeks.                []Hold PT, pending MD visit       Physical Therapy Daily Treatment Note    Date:  2023     Patient Name:  Sarah Khoury    :  1990  MRN: [id number]  Medical Diagnosis:  Labral tear of shoulder, right, initial encounter [S43.431A]  Treatment Diagnosis: limited function post SLAP repair R shoulder  Insurance/Certification information:  PT Insurance Information: medicaid - 30 visits per year; auth after visit #30  Physician Information:  HERNANDO Busby   Plan of care signed (Y/N): [x]  Yes []  No     Date of Patient follow up with Physician:      Progress Report: [x]

## 2023-05-04 ENCOUNTER — TELEPHONE (OUTPATIENT)
Dept: INTERNAL MEDICINE CLINIC | Age: 33
End: 2023-05-04

## 2023-05-09 ENCOUNTER — HOSPITAL ENCOUNTER (OUTPATIENT)
Dept: PHYSICAL THERAPY | Age: 33
Setting detail: THERAPIES SERIES
Discharge: HOME | End: 2023-05-09
Payer: MEDICAID

## 2023-05-09 PROCEDURE — 97530 THERAPEUTIC ACTIVITIES: CPT

## 2023-05-09 PROCEDURE — 97110 THERAPEUTIC EXERCISES: CPT

## 2023-05-09 NOTE — FLOWSHEET NOTE
activities related to strengthening, flexibility, endurance, ROM of   [] LE / Lumbar: core, proximal hip and LE for functional self-care, mobility, lifting and ambulation/stair navigation   [x] UE / Cervical: cervical, postural, scapular, scapulothoracic and UE control with self care, reaching, carrying, lifting, house/yardwork, driving, computer work  [] (40778)Reviewed/Progressed HEP activities related to improving balance, coordination, kinesthetic sense, posture, motor skill, proprioception of   [] LE: core, proximal hip and LE for self care, mobility, lifting, and ambulation/stair navigation    [] UE / Cervical: cervical, postural,  scapular, scapulothoracic and UE control with self care, reaching, carrying, lifting, house/yardwork, driving, computer work    Manual Treatments:  PROM / STM / Oscillations-Mobs:  G-I, II, III, IV (PA's, Inf., Post.)  [x] (02457) Provided manual therapy to mobilize LE, proximal hip and/or LS spine soft tissue/joints for the purpose of modulating pain, promoting relaxation,  increasing ROM, reducing/eliminating soft tissue swelling/inflammation/restriction, improving soft tissue extensibility and allowing for proper ROM for normal function with   [] LE / lumbar: self care, mobility, lifting and ambulation. [x] UE / Cervical: self care, reaching, carrying, lifting, house/yardwork, driving, computer work. Modalities:  [] (72588) Vasopneumatic compression: Utilized vasopneumatic compression to decrease edema / swelling for the purpose of improving mobility and quad tone / recruitment which will allow for increased overall function including but not limited to self-care, transfers, ambulation, and ascending / descending stairs.        Charges:  Timed Code Treatment Minutes: 35   Total Treatment Minutes: 35     [] EVAL - LOW (92850)   [] EVAL - MOD (15865)  [] EVAL - HIGH (44165)  [] RE-EVAL (77100)  [x] JW(69563) x  1     [] Ionto  [] NMR (43185) x 1       [] Vaso  [] Manual

## 2023-05-12 ENCOUNTER — HOSPITAL ENCOUNTER (OUTPATIENT)
Dept: PHYSICAL THERAPY | Age: 33
Setting detail: THERAPIES SERIES
Discharge: HOME | End: 2023-05-12
Payer: MEDICAID

## 2023-05-15 ENCOUNTER — HOSPITAL ENCOUNTER (OUTPATIENT)
Dept: PHYSICAL THERAPY | Age: 33
Setting detail: THERAPIES SERIES
Discharge: HOME | End: 2023-05-15
Payer: MEDICAID

## 2023-05-15 PROCEDURE — 97110 THERAPEUTIC EXERCISES: CPT

## 2023-05-15 PROCEDURE — 97112 NEUROMUSCULAR REEDUCATION: CPT

## 2023-05-15 PROCEDURE — 97140 MANUAL THERAPY 1/> REGIONS: CPT

## 2023-05-15 NOTE — FLOWSHEET NOTE
[address] [facility] Physical Therapy  Phone: [phone]   Fax: [phone]         Physical Therapy Daily Treatment Note    Date:  05/15/2023     Patient Name:  Nancy Brasher    :  1990  MRN: [id number]  Medical Diagnosis:  Labral tear of shoulder, right, initial encounter [S43.876A]  Treatment Diagnosis: limited function post SLAP repair R shoulder  Insurance/Certification information:  PT Insurance Information: medicaid - 30 visits per year; auth after visit #30  Physician Information:  HERNANDO Rivers   Plan of care signed (Y/N): [x]  Yes []  No     Date of Patient follow up with Physician:      Progress Report: []  Yes   [x]  No     Date Range for reporting period:  Beginnin2023  Ending:     Progress report due (10 Rx/or 30 days whichever is less): visit #86    Recertification due (POC duration/ or 90 days whichever is less): visit #20 or 6/3/23     Visit # Insurance Allowable Auth required? Date Range   30 []  Yes  [x]  No      Latex Allergy:  [x]NO      []YES  Preferred Language for Healthcare:   [x]English       []other:    Functional Scale:       Date assessed:  Quick dash: raw score = 37; dysfunction = 59%  23    **will complete quick dash again next PN; still relatively limited by restrictions    Pain level:  3/10     SUBJECTIVE:  Some pain in the anterior aspect of the shoulder; some shoulder blade discomfort. Shoulder \"catches\" every now and then, more frequent than before. The patient needs to schedule more visits. RTD:      OBJECTIVE:   5/15 - 170deg AROM flexion R shoulder - seated  : Pt not wearing sling this date. Demonstrating AA flexion to 180 degrees in supine with wand and with pulley in sitting.      - 148 PROM shoulder flexion  : Pt wearing sling into clinic; able to perform pendulum exercises and wrist/elbow ROM well without difficulty. Demonstrating fair posture; able to correct when cued.

## 2023-05-17 ENCOUNTER — HOSPITAL ENCOUNTER (OUTPATIENT)
Dept: PHYSICAL THERAPY | Age: 33
Setting detail: THERAPIES SERIES
Discharge: HOME | End: 2023-05-17
Payer: MEDICAID

## 2023-05-17 PROCEDURE — 97110 THERAPEUTIC EXERCISES: CPT

## 2023-05-17 PROCEDURE — 97112 NEUROMUSCULAR REEDUCATION: CPT

## 2023-05-17 NOTE — FLOWSHEET NOTE
and proximal hip recruitment and positioning and eccentric body weight control with ambulation re-education including up and down stairs     Home Management Training / Self Care:  [] (32015) Provided self-care/home management training related to activities of daily living and compensatory training, and/or use of adaptive equipment for improvement with: ADLs and compensatory training, meal preparation, safety procedures and instruction in use of adaptive equipment, including bathing, grooming, dressing, personal hygiene, basic household cleaning and chores. Home Exercise Program:    [x] (83435) Reviewed/Progressed HEP activities related to strengthening, flexibility, endurance, ROM of   [] LE / Lumbar: core, proximal hip and LE for functional self-care, mobility, lifting and ambulation/stair navigation   [x] UE / Cervical: cervical, postural, scapular, scapulothoracic and UE control with self care, reaching, carrying, lifting, house/yardwork, driving, computer work  [] (96411)Reviewed/Progressed HEP activities related to improving balance, coordination, kinesthetic sense, posture, motor skill, proprioception of   [] LE: core, proximal hip and LE for self care, mobility, lifting, and ambulation/stair navigation    [] UE / Cervical: cervical, postural,  scapular, scapulothoracic and UE control with self care, reaching, carrying, lifting, house/yardwork, driving, computer work    Manual Treatments:  PROM / STM / Oscillations-Mobs:  G-I, II, III, IV (PA's, Inf., Post.)  [x] (78615) Provided manual therapy to mobilize LE, proximal hip and/or LS spine soft tissue/joints for the purpose of modulating pain, promoting relaxation,  increasing ROM, reducing/eliminating soft tissue swelling/inflammation/restriction, improving soft tissue extensibility and allowing for proper ROM for normal function with   [] LE / lumbar: self care, mobility, lifting and ambulation.     [x] UE / Cervical: self care, reaching, carrying,

## 2023-05-18 ENCOUNTER — OFFICE VISIT (OUTPATIENT)
Dept: INTERNAL MEDICINE CLINIC | Age: 33
End: 2023-05-18
Payer: MEDICAID

## 2023-05-18 ENCOUNTER — TELEPHONE (OUTPATIENT)
Dept: INTERNAL MEDICINE CLINIC | Age: 33
End: 2023-05-18

## 2023-05-18 VITALS
WEIGHT: 217 LBS | OXYGEN SATURATION: 98 % | BODY MASS INDEX: 29.39 KG/M2 | SYSTOLIC BLOOD PRESSURE: 150 MMHG | HEART RATE: 79 BPM | HEIGHT: 72 IN | DIASTOLIC BLOOD PRESSURE: 90 MMHG

## 2023-05-18 DIAGNOSIS — R03.0 ELEVATED BP WITHOUT DIAGNOSIS OF HYPERTENSION: Primary | ICD-10-CM

## 2023-05-18 DIAGNOSIS — F90.0 ATTENTION DEFICIT HYPERACTIVITY DISORDER (ADHD), PREDOMINANTLY INATTENTIVE TYPE: ICD-10-CM

## 2023-05-18 DIAGNOSIS — F33.9 MAJOR DEPRESSIVE DISORDER, RECURRENT EPISODE WITH ANXIOUS DISTRESS (HCC): ICD-10-CM

## 2023-05-18 PROCEDURE — 99214 OFFICE O/P EST MOD 30 MIN: CPT | Performed by: NURSE PRACTITIONER

## 2023-05-18 RX ORDER — DEXTROAMPHETAMINE SACCHARATE, AMPHETAMINE ASPARTATE MONOHYDRATE, DEXTROAMPHETAMINE SULFATE AND AMPHETAMINE SULFATE 5; 5; 5; 5 MG/1; MG/1; MG/1; MG/1
20 CAPSULE, EXTENDED RELEASE ORAL EVERY MORNING
Qty: 30 CAPSULE | Refills: 0 | Status: SHIPPED | OUTPATIENT
Start: 2023-05-18 | End: 2023-06-17

## 2023-05-18 RX ORDER — PAROXETINE HYDROCHLORIDE 20 MG/1
20 TABLET, FILM COATED ORAL EVERY MORNING
Qty: 30 TABLET | Refills: 2 | Status: SHIPPED | OUTPATIENT
Start: 2023-05-18

## 2023-05-18 SDOH — ECONOMIC STABILITY: FOOD INSECURITY: WITHIN THE PAST 12 MONTHS, THE FOOD YOU BOUGHT JUST DIDN'T LAST AND YOU DIDN'T HAVE MONEY TO GET MORE.: NEVER TRUE

## 2023-05-18 SDOH — ECONOMIC STABILITY: INCOME INSECURITY: HOW HARD IS IT FOR YOU TO PAY FOR THE VERY BASICS LIKE FOOD, HOUSING, MEDICAL CARE, AND HEATING?: NOT HARD AT ALL

## 2023-05-18 SDOH — ECONOMIC STABILITY: HOUSING INSECURITY
IN THE LAST 12 MONTHS, WAS THERE A TIME WHEN YOU DID NOT HAVE A STEADY PLACE TO SLEEP OR SLEPT IN A SHELTER (INCLUDING NOW)?: NO

## 2023-05-18 SDOH — ECONOMIC STABILITY: FOOD INSECURITY: WITHIN THE PAST 12 MONTHS, YOU WORRIED THAT YOUR FOOD WOULD RUN OUT BEFORE YOU GOT MONEY TO BUY MORE.: NEVER TRUE

## 2023-05-18 ASSESSMENT — ENCOUNTER SYMPTOMS
ABDOMINAL PAIN: 0
VOMITING: 0
WHEEZING: 0
COUGH: 0
NAUSEA: 0
CONSTIPATION: 0
TROUBLE SWALLOWING: 0
SHORTNESS OF BREATH: 0

## 2023-05-18 NOTE — TELEPHONE ENCOUNTER
Patient called stating insurance won't cover his blood pressure cuff through the pharmacy needs to be DME order so it can go to medical supply company.     Please fax to [email or web address] at [phone]

## 2023-05-24 ENCOUNTER — HOSPITAL ENCOUNTER (OUTPATIENT)
Dept: PHYSICAL THERAPY | Age: 33
Setting detail: THERAPIES SERIES
Discharge: HOME | End: 2023-05-24
Payer: MEDICAID

## 2023-05-24 PROCEDURE — 97110 THERAPEUTIC EXERCISES: CPT

## 2023-05-24 PROCEDURE — 97530 THERAPEUTIC ACTIVITIES: CPT

## 2023-05-24 PROCEDURE — 97140 MANUAL THERAPY 1/> REGIONS: CPT

## 2023-05-24 NOTE — PLAN OF CARE
demonstrate an increase in Strength to at least 4/5 throughout LUE to allow for proper functional mobility as indicated by patients Functional Deficits. [x] Progressing: [] Met: [] Not Met: [] Adjusted  4. Patient will return to functional activities including all self care and household activties without increased symptoms or restriction. [x] Progressing: [] Met: [] Not Met: [] Adjusted    Overall Progression Towards Functional goals/ Treatment Progress Update:  [x] Patient is progressing as expected towards functional goals listed. [] Progression is slowed due to complexities/Impairments listed. [] Progression has been slowed due to co-morbidities. [] Plan just implemented, too soon to assess goals progression <30days   [] Goals require adjustment due to lack of progress  [] Patient is not progressing as expected and requires additional follow up with physician  [] Other    Persisting Functional Limitations/Impairments:  [x]Sleeping []Sitting               []Standing []Transfers        []Walking []Kneeling               []Stairs []Squatting / bending   [x]ADLs [x]Reaching  [x]Lifting  [x]Housework  [x]Driving [x]Job related tasks  [x]Sports/Recreation []Other:      ASSESSMENT:  Progressions of cuff and shoulder strengthening as able this date, with cues and support added to clean up efficiency and form of cuff activities. The patient is fatigued with cuff strengthening and elevation against gravity with low resistance. ROM continues to improve actively, with some tightness (and needed stability) with ER.  SEE POC ABOVE    Treatment/Activity Tolerance:  [x] Patient able to complete tx [] Patient limited by fatigue  [] Patient limited by pain  [] Patient limited by other medical complications  [] Other:     Prognosis: [x] Good [] Fair  [] Poor    Patient Requires Follow-up: [x] Yes  [] No    Plan for next treatment session:  periscapular, cuff strength progression, maximize/normalize shoulder

## 2023-05-26 ENCOUNTER — HOSPITAL ENCOUNTER (OUTPATIENT)
Dept: PHYSICAL THERAPY | Age: 33
Setting detail: THERAPIES SERIES
Discharge: HOME | End: 2023-05-26
Payer: MEDICAID

## 2023-05-26 PROCEDURE — 97112 NEUROMUSCULAR REEDUCATION: CPT

## 2023-05-26 PROCEDURE — 97110 THERAPEUTIC EXERCISES: CPT

## 2023-05-26 NOTE — PLAN OF CARE
[address] [facility] Physical Therapy  Phone: [phone]   Fax: [phone]    Physical Therapy Daily Treatment Note    Date:  2023     Patient Name:  Abhijeet Plaza    :  1990  MRN: [id number]  Medical Diagnosis:  Labral tear of shoulder, right, initial encounter [S43.431A] -- sx: 3/30/23  Treatment Diagnosis: limited function post SLAP repair R shoulder  Insurance/Certification information:  PT Insurance Information: medicaid - 30 visits per year; auth after visit #30  Physician Information:  HERNANDO Sandoval   Plan of care signed (Y/N): [x]  Yes []  No     Date of Patient follow up with Physician:      Progress Report: []  Yes   [x]  No     Date Range for reporting period:  Beginnin2023  POC: 23  Ending:     Progress report due (10 Rx/or 30 days whichever is less): visit #96    Recertification due (POC duration/ or 90 days whichever is less): visit #24 or 23    Visit # Insurance Allowable Auth required? Date Range   10/12 +  30 []  Yes  [x]  No      Latex Allergy:  [x]NO      []YES  Preferred Language for Healthcare:   [x]English       []other:    Functional Scale:       Date assessed:  Quick dash: raw score = 37; dysfunction = 59%  23  Quick dash: raw score = 18; dysfunction = 16%  23    Pain level:  0/10     SUBJECTIVE: The patient reports some soreness from sleeping with arm overhead, and recent progressions of resistance in PT.    RTD: 23    OBJECTIVE:    - fatigue with most cuff/shoulder exercises  5/15 - 170deg AROM flexion R shoulder - seated  : Pt not wearing sling this date. Demonstrating AA flexion to 180 degrees in supine with wand and with pulley in sitting.      - 148 PROM shoulder flexion  : Pt wearing sling into clinic; able to perform pendulum exercises and wrist/elbow ROM well without difficulty. Demonstrating fair posture; able to correct when cued.       RESTRICTIONS/PRECAUTIONS:  Surgery

## 2023-05-31 ENCOUNTER — HOSPITAL ENCOUNTER (OUTPATIENT)
Dept: PHYSICAL THERAPY | Age: 33
Setting detail: THERAPIES SERIES
Discharge: HOME | End: 2023-05-31
Payer: MEDICAID

## 2023-05-31 PROCEDURE — 97112 NEUROMUSCULAR REEDUCATION: CPT

## 2023-05-31 PROCEDURE — 97110 THERAPEUTIC EXERCISES: CPT

## 2023-05-31 PROCEDURE — 97140 MANUAL THERAPY 1/> REGIONS: CPT

## 2023-05-31 NOTE — FLOWSHEET NOTE
[address] [facility] Physical Therapy  Phone: [phone]   Fax: [phone]    Physical Therapy Daily Treatment Note    Date:  2023     Patient Name:  Vera Villatoro    :  1990  MRN: [id number]  Medical Diagnosis:  Labral tear of shoulder, right, initial encounter [S43.431A] -- sx: 3/30/23  Treatment Diagnosis: limited function post SLAP repair R shoulder  Insurance/Certification information:  PT Insurance Information: medicaid - 30 visits per year; auth after visit #30  Physician Information:  HERNANDO Colón   Plan of care signed (Y/N): [x]  Yes []  No     Date of Patient follow up with Physician:      Progress Report: []  Yes   [x]  No     Date Range for reporting period:  Beginnin2023  POC: 23  Ending:     Progress report due (10 Rx/or 30 days whichever is less): visit #81    Recertification due (POC duration/ or 90 days whichever is less): visit #24 or 23    Visit # Insurance Allowable Auth required? Date Range    +  30 []  Yes  [x]  No      Latex Allergy:  [x]NO      []YES  Preferred Language for Healthcare:   [x]English       []other:    Functional Scale:       Date assessed:  Quick dash: raw score = 37; dysfunction = 59%  23  Quick dash: raw score = 18; dysfunction = 16%  23    Pain level:  0/10     SUBJECTIVE: The patient denies much in the way of soreness or symptoms today. He will be 9 weeks post-op tomorrow. RTD: 23    OBJECTIVE:    - MMT ER =  - fatigue with most cuff/shoulder exercises  5/15 - 170deg AROM flexion R shoulder - seated  : Pt not wearing sling this date. Demonstrating AA flexion to 180 degrees in supine with wand and with pulley in sitting.      - 148 PROM shoulder flexion  : Pt wearing sling into clinic; able to perform pendulum exercises and wrist/elbow ROM well without difficulty. Demonstrating fair posture; able to correct when cued.

## 2023-06-02 ENCOUNTER — HOSPITAL ENCOUNTER (OUTPATIENT)
Dept: PHYSICAL THERAPY | Age: 33
Setting detail: THERAPIES SERIES
Discharge: HOME | End: 2023-06-02
Payer: MEDICAID

## 2023-06-02 PROCEDURE — 97112 NEUROMUSCULAR REEDUCATION: CPT

## 2023-06-02 PROCEDURE — 97110 THERAPEUTIC EXERCISES: CPT

## 2023-06-02 NOTE — FLOWSHEET NOTE
[address] [facility] Physical Therapy  Phone: [phone]   Fax: [phone]    Physical Therapy Daily Treatment Note    Date:  2023     Patient Name:  Walter Dobson    :  1990  MRN: [id number]  Medical Diagnosis:  Labral tear of shoulder, right, initial encounter [S43.431A] -- sx: 3/30/23  Treatment Diagnosis: limited function post SLAP repair R shoulder  Insurance/Certification information:  PT Insurance Information: medicaid - 30 visits per year; auth after visit #30  Physician Information:  HERNANDO De La O   Plan of care signed (Y/N): [x]  Yes []  No     Date of Patient follow up with Physician:      Progress Report: []  Yes   [x]  No     Date Range for reporting period:  Beginnin2023  POC: 23  Ending:     Progress report due (10 Rx/or 30 days whichever is less): visit #55    Recertification due (POC duration/ or 90 days whichever is less): visit #24 or 23    Visit # Insurance Allowable Auth required? Date Range    +  30 []  Yes  [x]  No      Latex Allergy:  [x]NO      []YES  Preferred Language for Healthcare:   [x]English       []other:    Functional Scale:       Date assessed:  Quick dash: raw score = 37; dysfunction = 59%  23  Quick dash: raw score = 18; dysfunction = 16%  23    Pain level:  0/10 -- > 4-5/10 during exercise    SUBJECTIVE: The patient denies soreness today nor pain. Joni Díaz to progress as able. RTD: 23    OBJECTIVE:    - MMT ER =  - fatigue with most cuff/shoulder exercises  5/15 - 170deg AROM flexion R shoulder - seated  : Pt not wearing sling this date. Demonstrating AA flexion to 180 degrees in supine with wand and with pulley in sitting.      - 148 PROM shoulder flexion  : Pt wearing sling into clinic; able to perform pendulum exercises and wrist/elbow ROM well without difficulty. Demonstrating fair posture; able to correct when cued.       RESTRICTIONS/PRECAUTIONS:

## 2023-06-06 ENCOUNTER — OFFICE VISIT (OUTPATIENT)
Dept: ORTHOPEDIC SURGERY | Age: 33
End: 2023-06-06

## 2023-06-06 VITALS — BODY MASS INDEX: 28.17 KG/M2 | RESPIRATION RATE: 16 BRPM | HEIGHT: 72 IN | WEIGHT: 208 LBS

## 2023-06-06 DIAGNOSIS — S43.431A LABRAL TEAR OF SHOULDER, RIGHT, INITIAL ENCOUNTER: Primary | ICD-10-CM

## 2023-06-06 NOTE — PROGRESS NOTES
History:  Aiyana Castillo is here for follow up after right shoulder arthroscopic surgery. Surgery date was 3/30/23. Findings at surgery: Right shoulder anterior labral tear, partial articular-sided supraspinatus tendon tear, chondral flap glenoid. Pain is controlled with current analgesics. Medication(s) being used: percocet. The patient's pain is rated at 0/10. He has been to PT at the [facility] office. Physical Examination:  Resp 16   Ht 6' (1.829 m)   Wt 208 lb (94.3 kg)   BMI 28.21 kg/m²    Patient is awake, alert, and in no acute distress. The incisions are healed. Right shoulder active forward flexion 180, passive 180, ER 60, IR L4   5/5 Supraspinatus, External rotation   His resting posture demonstrates some slight depression of his right scapula. I had the patient remove his shirt and examine his spine from posterior. He might have some thoracic scoliosis. Assessment:   2 months status post right shoulder arthroscopy, anterior labral repair, rotator cuff debridement, chondroplasty glenoid      Plan:   Continue physical therapy. He can resume his Mobic. Ice as needed. Follow-up in 6-8 weeks. Giulia Jensen. Inés Bolanos MD  Orthopaedic Surgery and Sports Medicine     Disclaimer: This note was generated with use of a verbal recognition program (DRAGON) and an attempt was made to check for errors. It is possible that there are still dictated errors within this office note. If so, please bring any significant errors to my attention for an addendum. All efforts were made to ensure that this office note is accurate.

## 2023-06-07 ENCOUNTER — HOSPITAL ENCOUNTER (OUTPATIENT)
Dept: PHYSICAL THERAPY | Age: 33
Setting detail: THERAPIES SERIES
Discharge: HOME | End: 2023-06-07
Payer: COMMERCIAL

## 2023-06-07 PROCEDURE — 97110 THERAPEUTIC EXERCISES: CPT

## 2023-06-07 PROCEDURE — 97112 NEUROMUSCULAR REEDUCATION: CPT

## 2023-06-07 PROCEDURE — 97140 MANUAL THERAPY 1/> REGIONS: CPT

## 2023-06-07 NOTE — FLOWSHEET NOTE
Prone Rows Prone Ext Prone T's Prone Y's Serratus Punches 8# 3 15 R 6/7 - ^ reps; ^ weight NPV   Bodyblade:  ER/IR  Flex  Abd  5/26   Superset Trio:  Sidelying Flexion  Sidelying Abduction  Sidelying ER   3#  3#  3#   2  2   Fatigue  fatigue   6/7 - no sidelying flexion due to biceps irritation; ^ weight NPV   Incline Push-ups (elbows in)  Banded Wall slides w/ foam roll lime 1  1 12  10 6/2 - added          Manual Intervention (89600)       PROM: R shoulder  - flexion to tolerance, abd to tolerance, IR to tolerance and ER to as tolerated  G2-3 post/inf GHJ mobs          5/24 - resumed   STM posterior cuff; cross body S   5/31   PNF D1/D2   15x ea R 6/7   Prone T/S Mobs   T1-10  G3-4  5 mobs ea 6/7                   Modalities: 5/9: Ice bag to go this date    Pt. Education:  5/9/2023: Verbal cues for proper exercise technique      Home Exercise Program:   5/9: Pt to continue with current HEP  Access Code: [email or web address]  URL: [email or web address]  Date: 05/15/2023  Prepared by: Zina Metcalf  Exercises  - Shoulder External Rotation with Anchored Resistance  - 1 x daily - 5 x weekly - 3 sets - 10 reps  - Shoulder Internal Rotation with Resistance  - 1 x daily - 5 x weekly - 3 sets - 10 reps  - Supine Shoulder External Rotation with Dowel  - 2 x daily - 6 x weekly - 1 sets - 4 reps - 10 hold  - Supine Scapular Protraction in Flexion with Dumbbells  - 1 x daily - 5 x weekly - 3 sets - 10 reps  - Prone Shoulder Row in Abduction  - 1 x daily - 5 x weekly - 3 sets - 4 reps  - Prone Single Arm Shoulder Horizontal Abduction with Dumbbell - Palm Down  - 1 x daily - 5 x weekly - 3 sets - 10 reps  - Prone Shoulder Extension - Single Arm with Dumbbell  - 1 x daily - 5 x weekly - 3 sets - 10 reps  - Sidelying Shoulder ER with Towel and Dumbbell  - 1 x daily - 5 x weekly - 3 sets - 10 reps    4/26/23: Provided handouts on isometrics. Access Code: [email or web address]  URL: [email or web address]  Date: 04/26/2023  Prepared by: Ava Gibson

## 2023-06-18 DIAGNOSIS — F33.9 MAJOR DEPRESSIVE DISORDER, RECURRENT EPISODE WITH ANXIOUS DISTRESS (HCC): ICD-10-CM

## 2023-06-18 DIAGNOSIS — F90.0 ATTENTION DEFICIT HYPERACTIVITY DISORDER (ADHD), PREDOMINANTLY INATTENTIVE TYPE: ICD-10-CM

## 2023-06-19 RX ORDER — PAROXETINE HYDROCHLORIDE 20 MG/1
20 TABLET, FILM COATED ORAL EVERY MORNING
Qty: 30 TABLET | Refills: 2 | OUTPATIENT
Start: 2023-06-19

## 2023-06-19 RX ORDER — DEXTROAMPHETAMINE SACCHARATE, AMPHETAMINE ASPARTATE MONOHYDRATE, DEXTROAMPHETAMINE SULFATE AND AMPHETAMINE SULFATE 5; 5; 5; 5 MG/1; MG/1; MG/1; MG/1
20 CAPSULE, EXTENDED RELEASE ORAL EVERY MORNING
Qty: 30 CAPSULE | Refills: 0 | Status: SHIPPED | OUTPATIENT
Start: 2023-06-19 | End: 2023-07-19

## 2023-06-21 ENCOUNTER — APPOINTMENT (OUTPATIENT)
Dept: PHYSICAL THERAPY | Age: 33
End: 2023-06-21
Payer: COMMERCIAL

## 2023-06-23 ENCOUNTER — APPOINTMENT (OUTPATIENT)
Dept: PHYSICAL THERAPY | Age: 33
End: 2023-06-23
Payer: COMMERCIAL

## 2023-06-27 ENCOUNTER — HOSPITAL ENCOUNTER (OUTPATIENT)
Dept: PHYSICAL THERAPY | Age: 33
Setting detail: THERAPIES SERIES
Discharge: HOME | End: 2023-06-27
Payer: COMMERCIAL

## 2023-06-27 PROCEDURE — 97110 THERAPEUTIC EXERCISES: CPT

## 2023-06-30 ENCOUNTER — HOSPITAL ENCOUNTER (OUTPATIENT)
Dept: PHYSICAL THERAPY | Age: 33
Setting detail: THERAPIES SERIES
Discharge: HOME | End: 2023-06-30
Payer: COMMERCIAL

## 2023-06-30 PROCEDURE — 97110 THERAPEUTIC EXERCISES: CPT

## 2023-06-30 PROCEDURE — 97140 MANUAL THERAPY 1/> REGIONS: CPT

## 2023-07-03 ENCOUNTER — HOSPITAL ENCOUNTER (OUTPATIENT)
Dept: PHYSICAL THERAPY | Age: 33
Setting detail: THERAPIES SERIES
Discharge: HOME | End: 2023-07-03
Payer: COMMERCIAL

## 2023-07-03 PROCEDURE — 97110 THERAPEUTIC EXERCISES: CPT

## 2023-07-03 PROCEDURE — 97530 THERAPEUTIC ACTIVITIES: CPT

## 2023-07-03 NOTE — FLOWSHEET NOTE
[facility]  Phone: [phone]   Fax: [phone]    Physical Therapy Daily Treatment Note    Date:  2023     Patient Name:  Barrera Leos    :  1990  MRN: [id number]  Medical Diagnosis:  Labral tear of shoulder, right, initial encounter [S43.431A] -- sx: 3/30/23  Treatment Diagnosis: limited function post SLAP repair R shoulder  Insurance/Certification information:  PT Insurance Information: medicaid - 30 visits per year; auth after visit #30  Physician Information:  HERNANDO Kong   Plan of care signed (Y/N): [x]  Yes []  No     Date of Patient follow up with Physician:      Progress Report: []  Yes   [x]  No     Date Range for reporting period:  Beginnin2023  POC: 23  Ending:     Progress report due (10 Rx/or 30 days whichever is less): visit #87    Recertification due (POC duration/ or 90 days whichever is less): visit #24 or 23    Visit # Insurance Allowable Auth required? Date Range    +  30 []  Yes  [x]  No      Latex Allergy:  [x]NO      []YES  Preferred Language for Healthcare:   [x]English       []other:    Functional Scale:       Date assessed:  Quick dash: raw score = 37; dysfunction = 59%  23  Quick dash: raw score = 18; dysfunction = 16%  23    Pain level:  0/10     SUBJECTIVE: Some \"strain pain\" reported with exercises, but not sharp pain. Reported great tolerance to exercises this date. RTD: 23    OBJECTIVE:   7/3 - +1 TTP LH biceps   - +2 TTP LH Biceps mid-way through session; ER/IR =  - R wrist extension leak with bench press    - UE scan denotes full, fluid AROM of R SHLD in every plane of motion and symmetrical to L   - +1 TTP R LH Biceps   - MMT ER =  - fatigue with most cuff/shoulder exercises  5/15 - 170deg AROM flexion R shoulder - seated  : Pt not wearing sling this date.   Demonstrating AA flexion to 180 degrees in supine with

## 2023-07-05 ENCOUNTER — APPOINTMENT (OUTPATIENT)
Dept: PHYSICAL THERAPY | Age: 33
End: 2023-07-05
Payer: COMMERCIAL

## 2023-07-12 ENCOUNTER — HOSPITAL ENCOUNTER (OUTPATIENT)
Dept: PHYSICAL THERAPY | Age: 33
Setting detail: THERAPIES SERIES
Discharge: HOME | End: 2023-07-12
Payer: COMMERCIAL

## 2023-07-12 PROCEDURE — 97530 THERAPEUTIC ACTIVITIES: CPT

## 2023-07-12 PROCEDURE — 97110 THERAPEUTIC EXERCISES: CPT

## 2023-07-12 NOTE — DISCHARGE SUMMARY
including but not limited to self-care, transfers, ambulation, and ascending / descending stairs. Charges:  Timed Code Treatment Minutes: 45   Total Treatment Minutes: 45     [] EVAL - LOW (90867)   [] EVAL - MOD (65935)  [] EVAL - HIGH (28926)  [] RE-EVAL (57405)  [x] IB(13931) x 1    [] Ionto  [] NMR (33703) x         [] Vaso  [] Manual (74724) x        [] Ultrasound  [x] TA x 2       [] Mech Traction (46005)  [] Aquatic Therapy x     [] ES (un) (38777):   [] Home Management Training x  [] ES(attended) (53700)   [] Dry Needling 1-2 muscles (07866):  [] Dry Needling 3+ muscles (340341)  [] Group:      [] Other:     GOALS:   Short Term Goals: To be achieved in: 2 weeks  1. Independent in HEP and progression per patient tolerance, in order to prevent re-injury. [] Progressing: [x] Met: [] Not Met: [] Adjusted  2. Patient will have a decrease in pain to facilitate improvement in movement, function, and ADLs as indicated by Functional Deficits. [] Progressing: [x] Met: [] Not Met: [] Adjusted     Long Term Goals: To be achieved in: 6-8 weeks  1. Pt will improve QDASH score by 10 points to reduce disability and progress towards PLOF. [] Progressing: [x] Met: [] Not Met: [] Adjusted  2. Patient will demonstrate increased AROM R UE equal to L to allow for proper joint functioning as indicated by patients Functional Deficits. [] Progressing: [x] Met: [] Not Met: [] Adjusted  3. Patient will demonstrate an increase in Strength to at least 4+/5 throughout LUE to allow for proper functional mobility as indicated by patients Functional Deficits. [] Progressing: [x] Met: [] Not Met: [] Adjusted  4. Patient will return to functional activities including all self care and household activties without increased symptoms or restriction.    [] Progressing: [x] Met: [] Not Met: [] Adjusted    Overall Progression Towards Functional goals/ Treatment Progress Update:  [x] Patient is progressing as expected towards functional

## 2023-07-17 DIAGNOSIS — F90.0 ATTENTION DEFICIT HYPERACTIVITY DISORDER (ADHD), PREDOMINANTLY INATTENTIVE TYPE: ICD-10-CM

## 2023-07-17 DIAGNOSIS — F33.9 MAJOR DEPRESSIVE DISORDER, RECURRENT EPISODE WITH ANXIOUS DISTRESS (HCC): ICD-10-CM

## 2023-07-17 RX ORDER — DEXTROAMPHETAMINE SACCHARATE, AMPHETAMINE ASPARTATE MONOHYDRATE, DEXTROAMPHETAMINE SULFATE AND AMPHETAMINE SULFATE 5; 5; 5; 5 MG/1; MG/1; MG/1; MG/1
20 CAPSULE, EXTENDED RELEASE ORAL EVERY MORNING
Qty: 30 CAPSULE | Refills: 0 | Status: SHIPPED | OUTPATIENT
Start: 2023-07-17 | End: 2023-07-25 | Stop reason: SDUPTHER

## 2023-07-17 RX ORDER — PAROXETINE HYDROCHLORIDE 20 MG/1
20 TABLET, FILM COATED ORAL EVERY MORNING
Qty: 30 TABLET | Refills: 2 | Status: SHIPPED | OUTPATIENT
Start: 2023-07-17 | End: 2023-08-31 | Stop reason: SDUPTHER

## 2023-07-17 NOTE — TELEPHONE ENCOUNTER
Medication Requested:  Adderall    Last visit: 5/18/23  Next visit: Visit date not found  Last refill: 6/19/23    Med contract on file:  [] yes   [x] no    Last urine drug screen: 5/18/2021  Consistent with medication(s):    [] yes   [] no    Last OARRS ran: unknown

## 2023-07-25 DIAGNOSIS — F90.0 ATTENTION DEFICIT HYPERACTIVITY DISORDER (ADHD), PREDOMINANTLY INATTENTIVE TYPE: ICD-10-CM

## 2023-07-25 RX ORDER — DEXTROAMPHETAMINE SACCHARATE, AMPHETAMINE ASPARTATE MONOHYDRATE, DEXTROAMPHETAMINE SULFATE AND AMPHETAMINE SULFATE 5; 5; 5; 5 MG/1; MG/1; MG/1; MG/1
20 CAPSULE, EXTENDED RELEASE ORAL EVERY MORNING
Qty: 30 CAPSULE | Refills: 0 | Status: SHIPPED | OUTPATIENT
Start: 2023-07-25 | End: 2023-08-24

## 2023-08-01 ENCOUNTER — OFFICE VISIT (OUTPATIENT)
Dept: ORTHOPEDIC SURGERY | Age: 33
End: 2023-08-01
Payer: COMMERCIAL

## 2023-08-01 VITALS — WEIGHT: 204 LBS | HEIGHT: 72 IN | RESPIRATION RATE: 16 BRPM | BODY MASS INDEX: 27.63 KG/M2

## 2023-08-01 DIAGNOSIS — S43.431A LABRAL TEAR OF SHOULDER, RIGHT, INITIAL ENCOUNTER: Primary | ICD-10-CM

## 2023-08-01 PROCEDURE — 99213 OFFICE O/P EST LOW 20 MIN: CPT | Performed by: ORTHOPAEDIC SURGERY

## 2023-08-21 DIAGNOSIS — F90.0 ATTENTION DEFICIT HYPERACTIVITY DISORDER (ADHD), PREDOMINANTLY INATTENTIVE TYPE: ICD-10-CM

## 2023-08-21 DIAGNOSIS — F33.9 MAJOR DEPRESSIVE DISORDER, RECURRENT EPISODE WITH ANXIOUS DISTRESS (HCC): ICD-10-CM

## 2023-08-21 RX ORDER — DEXTROAMPHETAMINE SACCHARATE, AMPHETAMINE ASPARTATE MONOHYDRATE, DEXTROAMPHETAMINE SULFATE AND AMPHETAMINE SULFATE 5; 5; 5; 5 MG/1; MG/1; MG/1; MG/1
20 CAPSULE, EXTENDED RELEASE ORAL EVERY MORNING
Qty: 30 CAPSULE | Refills: 0 | OUTPATIENT
Start: 2023-08-21 | End: 2023-09-20

## 2023-08-21 RX ORDER — PAROXETINE HYDROCHLORIDE 20 MG/1
20 TABLET, FILM COATED ORAL EVERY MORNING
Qty: 30 TABLET | Refills: 2 | OUTPATIENT
Start: 2023-08-21

## 2023-08-31 ENCOUNTER — OFFICE VISIT (OUTPATIENT)
Dept: INTERNAL MEDICINE CLINIC | Age: 33
End: 2023-08-31
Payer: COMMERCIAL

## 2023-08-31 VITALS
SYSTOLIC BLOOD PRESSURE: 140 MMHG | DIASTOLIC BLOOD PRESSURE: 80 MMHG | HEIGHT: 72 IN | WEIGHT: 204 LBS | HEART RATE: 63 BPM | BODY MASS INDEX: 27.63 KG/M2 | OXYGEN SATURATION: 99 %

## 2023-08-31 DIAGNOSIS — I10 HYPERTENSION GOAL BP (BLOOD PRESSURE) < 130/80: ICD-10-CM

## 2023-08-31 DIAGNOSIS — F41.9 ANXIETY: ICD-10-CM

## 2023-08-31 DIAGNOSIS — F90.0 ATTENTION DEFICIT HYPERACTIVITY DISORDER (ADHD), PREDOMINANTLY INATTENTIVE TYPE: Primary | ICD-10-CM

## 2023-08-31 DIAGNOSIS — F33.9 MAJOR DEPRESSIVE DISORDER, RECURRENT EPISODE WITH ANXIOUS DISTRESS (HCC): ICD-10-CM

## 2023-08-31 PROCEDURE — 3074F SYST BP LT 130 MM HG: CPT | Performed by: NURSE PRACTITIONER

## 2023-08-31 PROCEDURE — 3078F DIAST BP <80 MM HG: CPT | Performed by: NURSE PRACTITIONER

## 2023-08-31 PROCEDURE — 99214 OFFICE O/P EST MOD 30 MIN: CPT | Performed by: NURSE PRACTITIONER

## 2023-08-31 RX ORDER — AMLODIPINE BESYLATE 5 MG/1
5 TABLET ORAL DAILY
Qty: 90 TABLET | Refills: 0 | Status: SHIPPED | OUTPATIENT
Start: 2023-08-31

## 2023-08-31 RX ORDER — DEXTROAMPHETAMINE SACCHARATE, AMPHETAMINE ASPARTATE MONOHYDRATE, DEXTROAMPHETAMINE SULFATE AND AMPHETAMINE SULFATE 5; 5; 5; 5 MG/1; MG/1; MG/1; MG/1
20 CAPSULE, EXTENDED RELEASE ORAL EVERY MORNING
Qty: 30 CAPSULE | Refills: 0 | Status: CANCELLED | OUTPATIENT
Start: 2023-08-31 | End: 2023-09-30

## 2023-08-31 RX ORDER — DEXTROAMPHETAMINE SACCHARATE, AMPHETAMINE ASPARTATE MONOHYDRATE, DEXTROAMPHETAMINE SULFATE AND AMPHETAMINE SULFATE 5; 5; 5; 5 MG/1; MG/1; MG/1; MG/1
20 CAPSULE, EXTENDED RELEASE ORAL EVERY MORNING
Qty: 30 CAPSULE | Refills: 0 | Status: SHIPPED | OUTPATIENT
Start: 2023-08-31 | End: 2023-09-30

## 2023-08-31 RX ORDER — PAROXETINE HYDROCHLORIDE 20 MG/1
20 TABLET, FILM COATED ORAL EVERY MORNING
Qty: 30 TABLET | Refills: 2 | Status: CANCELLED | OUTPATIENT
Start: 2023-08-31

## 2023-08-31 RX ORDER — PAROXETINE HYDROCHLORIDE 20 MG/1
20 TABLET, FILM COATED ORAL EVERY MORNING
Qty: 90 TABLET | Refills: 1 | Status: SHIPPED | OUTPATIENT
Start: 2023-08-31

## 2023-08-31 ASSESSMENT — PATIENT HEALTH QUESTIONNAIRE - PHQ9
7. TROUBLE CONCENTRATING ON THINGS, SUCH AS READING THE NEWSPAPER OR WATCHING TELEVISION: 0
SUM OF ALL RESPONSES TO PHQ9 QUESTIONS 1 & 2: 0
1. LITTLE INTEREST OR PLEASURE IN DOING THINGS: 0
9. THOUGHTS THAT YOU WOULD BE BETTER OFF DEAD, OR OF HURTING YOURSELF: 0
SUM OF ALL RESPONSES TO PHQ QUESTIONS 1-9: 0
3. TROUBLE FALLING OR STAYING ASLEEP: 0
SUM OF ALL RESPONSES TO PHQ QUESTIONS 1-9: 0
10. IF YOU CHECKED OFF ANY PROBLEMS, HOW DIFFICULT HAVE THESE PROBLEMS MADE IT FOR YOU TO DO YOUR WORK, TAKE CARE OF THINGS AT HOME, OR GET ALONG WITH OTHER PEOPLE: 0
8. MOVING OR SPEAKING SO SLOWLY THAT OTHER PEOPLE COULD HAVE NOTICED. OR THE OPPOSITE, BEING SO FIGETY OR RESTLESS THAT YOU HAVE BEEN MOVING AROUND A LOT MORE THAN USUAL: 0
5. POOR APPETITE OR OVEREATING: 0
6. FEELING BAD ABOUT YOURSELF - OR THAT YOU ARE A FAILURE OR HAVE LET YOURSELF OR YOUR FAMILY DOWN: 0
SUM OF ALL RESPONSES TO PHQ QUESTIONS 1-9: 0
SUM OF ALL RESPONSES TO PHQ QUESTIONS 1-9: 0
4. FEELING TIRED OR HAVING LITTLE ENERGY: 0
2. FEELING DOWN, DEPRESSED OR HOPELESS: 0

## 2023-08-31 NOTE — PROGRESS NOTES
Visit Information    Have you changed or started any medications since your last visit including any over-the-counter medicines, vitamins, or herbal medicines? no   Are you having any side effects from any of your medications? -  no  Have you stopped taking any of your medications? Is so, why? -  no    Have you seen any other physician or provider since your last visit? No  Have you had any other diagnostic tests since your last visit? No  Have you been seen in the emergency room and/or had an admission to a hospital since we last saw you? No  Have you had your routine dental cleaning in the past 6 months? no    Have you activated your [employer] account? If not, what are your barriers?  Yes     Patient Care Team:  EUSEBIO Hilario CNP as PCP - General (Internal Medicine)  EUSEBIO Hilario CNP as PCP - Empaneled Provider  EUSEBIO [facility] - NP (Nurse Practitioner)    Medical History Review  Past Medical, Family, and Social History reviewed and does contribute to the patient presenting condition    Health Maintenance   Topic Date Due    Hepatitis C screen  Never done    DTaP/Tdap/Td vaccine (7 - Td or Tdap) 09/03/2014    COVID-19 Vaccine (2 - Booster for Mata series) 06/04/2021    Flu vaccine (1) Never done    Depression Monitoring  08/31/2024    Hepatitis B vaccine  Completed    Hib vaccine  Completed    Varicella vaccine  Completed    HIV screen  Completed    Hepatitis A vaccine  Aged Out    HPV vaccine  Aged Out    Meningococcal (ACWY) vaccine  Aged Out    Pneumococcal 0-64 years Vaccine  Aged Out        [address] [place] [id number]  Dept: [phone]  Dept Fax: [phone]    Office Progress/Follow Up Note  Date of patient's visit: 8/31/2023   Patient's Name:  Anny Villalobos  YOB: 1990            Patient Care Team:  EUSEBIO Hilario CNP as PCP - General (Internal Medicine)  EUSEBIO Hilario CNP as PCP - Lieutenant Wetzel

## 2023-09-12 ASSESSMENT — ENCOUNTER SYMPTOMS
WHEEZING: 0
ABDOMINAL PAIN: 0
COLOR CHANGE: 0
TROUBLE SWALLOWING: 0
COUGH: 0
CONSTIPATION: 0
NAUSEA: 0
SHORTNESS OF BREATH: 0

## 2023-09-27 DIAGNOSIS — F90.0 ATTENTION DEFICIT HYPERACTIVITY DISORDER (ADHD), PREDOMINANTLY INATTENTIVE TYPE: ICD-10-CM

## 2023-09-27 DIAGNOSIS — F33.9 MAJOR DEPRESSIVE DISORDER, RECURRENT EPISODE WITH ANXIOUS DISTRESS (HCC): ICD-10-CM

## 2023-09-27 DIAGNOSIS — I10 HYPERTENSION GOAL BP (BLOOD PRESSURE) < 130/80: ICD-10-CM

## 2023-09-27 RX ORDER — PAROXETINE HYDROCHLORIDE 20 MG/1
20 TABLET, FILM COATED ORAL EVERY MORNING
Qty: 90 TABLET | Refills: 5 | OUTPATIENT
Start: 2023-09-27

## 2023-09-27 RX ORDER — DEXTROAMPHETAMINE SACCHARATE, AMPHETAMINE ASPARTATE MONOHYDRATE, DEXTROAMPHETAMINE SULFATE AND AMPHETAMINE SULFATE 5; 5; 5; 5 MG/1; MG/1; MG/1; MG/1
20 CAPSULE, EXTENDED RELEASE ORAL EVERY MORNING
Qty: 30 CAPSULE | Refills: 0 | OUTPATIENT
Start: 2023-09-27 | End: 2023-10-27

## 2023-09-27 RX ORDER — AMLODIPINE BESYLATE 5 MG/1
5 TABLET ORAL DAILY
Qty: 90 TABLET | Refills: 5 | OUTPATIENT
Start: 2023-09-27

## 2023-09-28 RX ORDER — AMLODIPINE BESYLATE 5 MG/1
5 TABLET ORAL DAILY
Qty: 90 TABLET | Refills: 1 | Status: SHIPPED | OUTPATIENT
Start: 2023-09-28

## 2023-09-28 RX ORDER — PAROXETINE HYDROCHLORIDE 20 MG/1
20 TABLET, FILM COATED ORAL EVERY MORNING
Qty: 90 TABLET | Refills: 1 | Status: SHIPPED | OUTPATIENT
Start: 2023-09-28

## 2023-09-28 NOTE — TELEPHONE ENCOUNTER
Norvasc and paxil sent to [facility] but I don't believe the Adderall can be filled there as it is only 1 month supply.

## 2023-10-24 DIAGNOSIS — F90.0 ATTENTION DEFICIT HYPERACTIVITY DISORDER (ADHD), PREDOMINANTLY INATTENTIVE TYPE: ICD-10-CM

## 2023-10-24 RX ORDER — DEXTROAMPHETAMINE SACCHARATE, AMPHETAMINE ASPARTATE MONOHYDRATE, DEXTROAMPHETAMINE SULFATE AND AMPHETAMINE SULFATE 5; 5; 5; 5 MG/1; MG/1; MG/1; MG/1
20 CAPSULE, EXTENDED RELEASE ORAL EVERY MORNING
Qty: 30 CAPSULE | Refills: 0 | Status: SHIPPED | OUTPATIENT
Start: 2023-10-24 | End: 2023-11-23

## 2023-10-24 NOTE — TELEPHONE ENCOUNTER
Patient was supposed to get BP rechecked in 1 month (nurse visit) from last visit. Can you please ask him to stop in so it can be rechecked?

## 2023-10-25 ENCOUNTER — TELEPHONE (OUTPATIENT)
Dept: INTERNAL MEDICINE CLINIC | Age: 33
End: 2023-10-25

## 2023-10-25 NOTE — TELEPHONE ENCOUNTER
Attempted to contact patient letting him know that I had to change his appointment to 1PM on 11/30.  Patient did not answer- LVM

## 2023-10-31 ENCOUNTER — TELEPHONE (OUTPATIENT)
Dept: INTERNAL MEDICINE CLINIC | Age: 33
End: 2023-10-31

## 2023-10-31 NOTE — TELEPHONE ENCOUNTER
Attempted to call patient because [facility] cannot be [facility] so patient needs to pick which pharmacy this medication needs to go to.

## 2023-11-01 DIAGNOSIS — F90.0 ATTENTION DEFICIT HYPERACTIVITY DISORDER (ADHD), PREDOMINANTLY INATTENTIVE TYPE: ICD-10-CM

## 2023-11-01 RX ORDER — DEXTROAMPHETAMINE SACCHARATE, AMPHETAMINE ASPARTATE MONOHYDRATE, DEXTROAMPHETAMINE SULFATE AND AMPHETAMINE SULFATE 5; 5; 5; 5 MG/1; MG/1; MG/1; MG/1
20 CAPSULE, EXTENDED RELEASE ORAL EVERY MORNING
Qty: 30 CAPSULE | Refills: 0 | Status: SHIPPED | OUTPATIENT
Start: 2023-11-01 | End: 2023-12-01

## 2023-11-01 NOTE — TELEPHONE ENCOUNTER
----- Message from [facility] sent at 10/31/2023  4:29 PM EDT -----  Subject: Medication Problem    Medication: amphetamine-dextroamphetamine (ADDERALL XR) 20 MG extended   release capsule  Dosage: Take 1 capsule by mouth every morning for 30 days.   Ordering Provider: Sarah El    Question/Problem: patient is returning the call and would like it to be   sent to this pharmacy that is listed      Pharmacy: [facility] [address] [facility] [phone]    ---------------------------------------------------------------------------  --------------  Lexie DIXON  [phone]; OK to leave message on voicemail  ---------------------------------------------------------------------------  --------------    SCRIPT ANSWERS  Relationship to Patient: Self

## 2023-11-08 LAB
ALBUMIN SERPL-MCNC: 4.6 G/DL
ALBUMIN SERPL-MCNC: 4.6 G/DL (ref 3.5–5.2)
ALK PHOSPHATASE: 53 U/L (ref 40–112)
ALP BLD-CCNC: 53 U/L
ALT SERPL-CCNC: 17 U/L
ALT SERPL-CCNC: 17 U/L (ref 5–50)
ANION GAP SERPL CALCULATED.3IONS-SCNC: 12 MEQ/L (ref 7–16)
ANION GAP SERPL CALCULATED.3IONS-SCNC: 12 MMOL/L
AST SERPL-CCNC: 17 U/L
AST SERPL-CCNC: 17 U/L (ref 9–50)
BASOPHILS ABSOLUTE: ABNORMAL
BASOPHILS RELATIVE PERCENT: ABNORMAL
BILIRUB SERPL-MCNC: 0.9 MG/DL
BILIRUB SERPL-MCNC: 0.9 MG/DL (ref 0.1–1.4)
BUN BLDV-MCNC: 10 MG/DL
BUN BLDV-MCNC: 10 MG/DL (ref 6–20)
CALCIUM SERPL-MCNC: 9.6 MG/DL
CALCIUM SERPL-MCNC: 9.6 MG/DL (ref 8.5–10.5)
CHLORIDE BLD-SCNC: 103 MEQ/L (ref 95–107)
CHLORIDE BLD-SCNC: 103 MMOL/L
CO2: 25 MEQ/L (ref 19–31)
CO2: 25 MMOL/L
CREAT SERPL-MCNC: 1.05 MG/DL
CREAT SERPL-MCNC: 1.05 MG/DL (ref 0.8–1.4)
EGFR IF NONAFRICAN AMERICAN: 97 ML/MIN/1.73
EGFR: 97
EOSINOPHILS ABSOLUTE: ABNORMAL
EOSINOPHILS RELATIVE PERCENT: ABNORMAL
GLUCOSE BLD-MCNC: 106 MG/DL
GLUCOSE: 106 MG/DL (ref 70–99)
HCT VFR BLD CALC: 43.6 % (ref 40–51)
HCT VFR BLD CALC: 43.6 % (ref 41–53)
HEMOGLOBIN: 13.5 G/DL (ref 13.5–17)
HEMOGLOBIN: 13.5 G/DL (ref 13.5–17.5)
LYMPHOCYTES ABSOLUTE: ABNORMAL
LYMPHOCYTES RELATIVE PERCENT: ABNORMAL
MCH RBC QN AUTO: 22.8 PG
MCH RBC QN AUTO: 22.8 PG (ref 25–33)
MCHC RBC AUTO-ENTMCNC: 31 G/DL
MCHC RBC AUTO-ENTMCNC: 31 G/DL (ref 31–36)
MCV RBC AUTO: 73.6 FL
MCV RBC AUTO: 73.6 FL (ref 80–99)
MONOCYTES ABSOLUTE: ABNORMAL
MONOCYTES RELATIVE PERCENT: ABNORMAL
NEUTROPHILS ABSOLUTE: ABNORMAL
NEUTROPHILS RELATIVE PERCENT: ABNORMAL
PDW BLD-RTO: 15.7 % (ref 11.5–15)
PLATELET # BLD: 236 K/ΜL
PLATELETS: 236 K/UL (ref 130–400)
PMV BLD AUTO: 9.9 FL
PMV BLD AUTO: 9.9 FL (ref 9.3–13)
POTASSIUM SERPL-SCNC: 3.8 MEQ/L (ref 3.5–5.4)
POTASSIUM SERPL-SCNC: 3.8 MMOL/L
RBC # BLD: 5.92 10^6/ΜL
RBC: 5.92 M/UL (ref 4.5–6.1)
SODIUM BLD-SCNC: 140 MEQ/L (ref 133–146)
SODIUM BLD-SCNC: 140 MMOL/L
TOTAL PROTEIN: 7.5
TOTAL PROTEIN: 7.5 G/DL (ref 6.1–8.3)
TSH SERPL DL<=0.05 MIU/L-ACNC: 0.65 UIU/ML
TSH SERPL DL<=0.05 MIU/L-ACNC: 0.65 UIU/ML (ref 0.4–4.1)
WBC # BLD: 6.4 10^3/ML
WBC: 6.4 K/UL (ref 3.5–11)

## 2023-11-09 DIAGNOSIS — R03.0 ELEVATED BP WITHOUT DIAGNOSIS OF HYPERTENSION: ICD-10-CM

## 2023-11-30 ENCOUNTER — OFFICE VISIT (OUTPATIENT)
Dept: INTERNAL MEDICINE CLINIC | Age: 33
End: 2023-11-30
Payer: COMMERCIAL

## 2023-11-30 VITALS
SYSTOLIC BLOOD PRESSURE: 124 MMHG | BODY MASS INDEX: 27.09 KG/M2 | OXYGEN SATURATION: 97 % | HEART RATE: 71 BPM | WEIGHT: 200 LBS | DIASTOLIC BLOOD PRESSURE: 86 MMHG | HEIGHT: 72 IN

## 2023-11-30 DIAGNOSIS — R73.09 ELEVATED GLUCOSE: ICD-10-CM

## 2023-11-30 DIAGNOSIS — I10 HYPERTENSION GOAL BP (BLOOD PRESSURE) < 130/80: Primary | ICD-10-CM

## 2023-11-30 DIAGNOSIS — F41.9 ANXIETY: ICD-10-CM

## 2023-11-30 DIAGNOSIS — F90.0 ATTENTION DEFICIT HYPERACTIVITY DISORDER (ADHD), PREDOMINANTLY INATTENTIVE TYPE: ICD-10-CM

## 2023-11-30 DIAGNOSIS — R71.8 LOW MEAN CORPUSCULAR VOLUME (MCV): ICD-10-CM

## 2023-11-30 PROCEDURE — 99214 OFFICE O/P EST MOD 30 MIN: CPT | Performed by: NURSE PRACTITIONER

## 2023-11-30 PROCEDURE — 3074F SYST BP LT 130 MM HG: CPT | Performed by: NURSE PRACTITIONER

## 2023-11-30 PROCEDURE — 3078F DIAST BP <80 MM HG: CPT | Performed by: NURSE PRACTITIONER

## 2023-11-30 RX ORDER — DEXTROAMPHETAMINE SACCHARATE, AMPHETAMINE ASPARTATE MONOHYDRATE, DEXTROAMPHETAMINE SULFATE AND AMPHETAMINE SULFATE 5; 5; 5; 5 MG/1; MG/1; MG/1; MG/1
20 CAPSULE, EXTENDED RELEASE ORAL EVERY MORNING
Qty: 30 CAPSULE | Refills: 0 | Status: SHIPPED | OUTPATIENT
Start: 2023-12-01 | End: 2023-12-31

## 2023-11-30 NOTE — PROGRESS NOTES
Visit Information    Have you changed or started any medications since your last visit including any over-the-counter medicines, vitamins, or herbal medicines? no   Are you having any side effects from any of your medications? -  no  Have you stopped taking any of your medications? Is so, why? -  no    Have you seen any other physician or provider since your last visit? No  Have you had any other diagnostic tests since your last visit? No  Have you been seen in the emergency room and/or had an admission to a hospital since we last saw you? No  Have you had your routine dental cleaning in the past 6 months? no    Have you activated your [employer] account? If not, what are your barriers?  Yes     Patient Care Team:  EUSEBIO Naik CNP as PCP - General (Internal Medicine)  EUSEBIO Naik CNP as PCP - Empaneled Provider  EUSEBIO Perez - LINDA (Nurse Practitioner)    Medical History Review  Past Medical, Family, and Social History reviewed and does contribute to the patient presenting condition    Health Maintenance   Topic Date Due    Hepatitis C screen  Never done    DTaP/Tdap/Td vaccine (7 - Td or Tdap) 09/03/2014    Flu vaccine (1) Never done    COVID-19 Vaccine (2 - 2023-24 season) 09/01/2023    Depression Monitoring  08/31/2024    Hepatitis B vaccine  Completed    Hib vaccine  Completed    Varicella vaccine  Completed    HIV screen  Completed    Hepatitis A vaccine  Aged Out    HPV vaccine  Aged Out    Meningococcal (ACWY) vaccine  Aged Out    Pneumococcal 0-64 years Vaccine  Aged Out        [address] [place] [id number]  Dept: [phone]  Dept Fax: [phone]    Office Progress/Follow Up Note  Date of patient's visit: 11/30/2023   Patient's Name:  Barrera Leos  YOB: 1990            Patient Care Team:  EUSEBIO Naik CNP as PCP - General (Internal Medicine)  EUSEBIO Naik CNP as PCP - Salina Osgood

## 2023-12-07 DIAGNOSIS — F90.0 ATTENTION DEFICIT HYPERACTIVITY DISORDER (ADHD), PREDOMINANTLY INATTENTIVE TYPE: ICD-10-CM

## 2023-12-07 RX ORDER — DEXTROAMPHETAMINE SACCHARATE, AMPHETAMINE ASPARTATE MONOHYDRATE, DEXTROAMPHETAMINE SULFATE AND AMPHETAMINE SULFATE 5; 5; 5; 5 MG/1; MG/1; MG/1; MG/1
20 CAPSULE, EXTENDED RELEASE ORAL EVERY MORNING
Qty: 30 CAPSULE | Refills: 0 | OUTPATIENT
Start: 2023-12-07 | End: 2024-01-06

## 2023-12-12 ASSESSMENT — ENCOUNTER SYMPTOMS
COLOR CHANGE: 0
CONSTIPATION: 0
SHORTNESS OF BREATH: 0
COUGH: 0
TROUBLE SWALLOWING: 0
ABDOMINAL PAIN: 0
NAUSEA: 0
WHEEZING: 0

## 2024-01-09 DIAGNOSIS — I10 HYPERTENSION GOAL BP (BLOOD PRESSURE) < 130/80: ICD-10-CM

## 2024-01-09 DIAGNOSIS — F90.0 ATTENTION DEFICIT HYPERACTIVITY DISORDER (ADHD), PREDOMINANTLY INATTENTIVE TYPE: ICD-10-CM

## 2024-01-09 DIAGNOSIS — F33.9 MAJOR DEPRESSIVE DISORDER, RECURRENT EPISODE WITH ANXIOUS DISTRESS (HCC): ICD-10-CM

## 2024-01-09 RX ORDER — PAROXETINE HYDROCHLORIDE 20 MG/1
20 TABLET, FILM COATED ORAL EVERY MORNING
Qty: 90 TABLET | Refills: 1 | Status: SHIPPED | OUTPATIENT
Start: 2024-01-09

## 2024-01-09 RX ORDER — DEXTROAMPHETAMINE SACCHARATE, AMPHETAMINE ASPARTATE MONOHYDRATE, DEXTROAMPHETAMINE SULFATE AND AMPHETAMINE SULFATE 5; 5; 5; 5 MG/1; MG/1; MG/1; MG/1
20 CAPSULE, EXTENDED RELEASE ORAL EVERY MORNING
Qty: 30 CAPSULE | Refills: 0 | Status: SHIPPED | OUTPATIENT
Start: 2024-01-09 | End: 2024-02-08

## 2024-01-09 RX ORDER — AMLODIPINE BESYLATE 5 MG/1
5 TABLET ORAL DAILY
Qty: 90 TABLET | Refills: 1 | Status: SHIPPED | OUTPATIENT
Start: 2024-01-09

## 2024-02-29 ENCOUNTER — OFFICE VISIT (OUTPATIENT)
Dept: INTERNAL MEDICINE CLINIC | Age: 34
End: 2024-02-29
Payer: COMMERCIAL

## 2024-02-29 VITALS
DIASTOLIC BLOOD PRESSURE: 82 MMHG | OXYGEN SATURATION: 99 % | BODY MASS INDEX: 27.36 KG/M2 | HEIGHT: 72 IN | SYSTOLIC BLOOD PRESSURE: 126 MMHG | WEIGHT: 202 LBS | HEART RATE: 61 BPM

## 2024-02-29 DIAGNOSIS — I10 HYPERTENSION GOAL BP (BLOOD PRESSURE) < 130/80: Primary | ICD-10-CM

## 2024-02-29 DIAGNOSIS — F90.0 ATTENTION DEFICIT HYPERACTIVITY DISORDER (ADHD), PREDOMINANTLY INATTENTIVE TYPE: ICD-10-CM

## 2024-02-29 DIAGNOSIS — J01.90 ACUTE SINUSITIS, RECURRENCE NOT SPECIFIED, UNSPECIFIED LOCATION: ICD-10-CM

## 2024-02-29 DIAGNOSIS — F33.9 MAJOR DEPRESSIVE DISORDER, RECURRENT EPISODE WITH ANXIOUS DISTRESS (HCC): ICD-10-CM

## 2024-02-29 PROCEDURE — 3079F DIAST BP 80-89 MM HG: CPT | Performed by: NURSE PRACTITIONER

## 2024-02-29 PROCEDURE — 3074F SYST BP LT 130 MM HG: CPT | Performed by: NURSE PRACTITIONER

## 2024-02-29 PROCEDURE — 99214 OFFICE O/P EST MOD 30 MIN: CPT | Performed by: NURSE PRACTITIONER

## 2024-02-29 RX ORDER — PAROXETINE HYDROCHLORIDE 20 MG/1
20 TABLET, FILM COATED ORAL EVERY MORNING
Qty: 90 TABLET | Refills: 1 | Status: SHIPPED | OUTPATIENT
Start: 2024-02-29

## 2024-02-29 RX ORDER — DEXTROAMPHETAMINE SACCHARATE, AMPHETAMINE ASPARTATE MONOHYDRATE, DEXTROAMPHETAMINE SULFATE AND AMPHETAMINE SULFATE 5; 5; 5; 5 MG/1; MG/1; MG/1; MG/1
20 CAPSULE, EXTENDED RELEASE ORAL EVERY MORNING
Qty: 30 CAPSULE | Refills: 0 | Status: SHIPPED | OUTPATIENT
Start: 2024-02-29 | End: 2024-03-30

## 2024-02-29 RX ORDER — PAROXETINE HYDROCHLORIDE 20 MG/1
20 TABLET, FILM COATED ORAL EVERY MORNING
Qty: 90 TABLET | Refills: 1 | Status: CANCELLED | OUTPATIENT
Start: 2024-02-29

## 2024-02-29 RX ORDER — AMLODIPINE BESYLATE 5 MG/1
5 TABLET ORAL DAILY
Qty: 90 TABLET | Refills: 1 | Status: SHIPPED | OUTPATIENT
Start: 2024-02-29

## 2024-02-29 RX ORDER — DEXTROAMPHETAMINE SACCHARATE, AMPHETAMINE ASPARTATE MONOHYDRATE, DEXTROAMPHETAMINE SULFATE AND AMPHETAMINE SULFATE 5; 5; 5; 5 MG/1; MG/1; MG/1; MG/1
20 CAPSULE, EXTENDED RELEASE ORAL EVERY MORNING
Qty: 30 CAPSULE | Refills: 0 | Status: CANCELLED | OUTPATIENT
Start: 2024-02-29 | End: 2024-03-30

## 2024-02-29 ASSESSMENT — ENCOUNTER SYMPTOMS
COUGH: 1
CONSTIPATION: 0
SINUS PRESSURE: 1
COLOR CHANGE: 0
TROUBLE SWALLOWING: 0
ABDOMINAL PAIN: 0
NAUSEA: 0
WHEEZING: 0
SORE THROAT: 0
RHINORRHEA: 0
SINUS PAIN: 0
SHORTNESS OF BREATH: 0

## 2024-02-29 ASSESSMENT — PATIENT HEALTH QUESTIONNAIRE - PHQ9
7. TROUBLE CONCENTRATING ON THINGS, SUCH AS READING THE NEWSPAPER OR WATCHING TELEVISION: 0
1. LITTLE INTEREST OR PLEASURE IN DOING THINGS: 1
SUM OF ALL RESPONSES TO PHQ QUESTIONS 1-9: 2
4. FEELING TIRED OR HAVING LITTLE ENERGY: 0
SUM OF ALL RESPONSES TO PHQ9 QUESTIONS 1 & 2: 2
2. FEELING DOWN, DEPRESSED OR HOPELESS: 1
5. POOR APPETITE OR OVEREATING: 0
8. MOVING OR SPEAKING SO SLOWLY THAT OTHER PEOPLE COULD HAVE NOTICED. OR THE OPPOSITE, BEING SO FIGETY OR RESTLESS THAT YOU HAVE BEEN MOVING AROUND A LOT MORE THAN USUAL: 0
SUM OF ALL RESPONSES TO PHQ QUESTIONS 1-9: 2
9. THOUGHTS THAT YOU WOULD BE BETTER OFF DEAD, OR OF HURTING YOURSELF: 0
SUM OF ALL RESPONSES TO PHQ QUESTIONS 1-9: 2
SUM OF ALL RESPONSES TO PHQ QUESTIONS 1-9: 2
10. IF YOU CHECKED OFF ANY PROBLEMS, HOW DIFFICULT HAVE THESE PROBLEMS MADE IT FOR YOU TO DO YOUR WORK, TAKE CARE OF THINGS AT HOME, OR GET ALONG WITH OTHER PEOPLE: 0
6. FEELING BAD ABOUT YOURSELF - OR THAT YOU ARE A FAILURE OR HAVE LET YOURSELF OR YOUR FAMILY DOWN: 0

## 2024-02-29 NOTE — PROGRESS NOTES
tenderness. Normal range of motion.      Right knee: No swelling or deformity. Normal range of motion. No tenderness.      Left knee: Normal.      Right lower leg: No edema.      Left lower leg: No edema.   Lymphadenopathy:      Cervical: No cervical adenopathy.   Skin:     General: Skin is warm and dry.   Neurological:      Mental Status: He is alert and oriented to person, place, and time.      Gait: Gait normal.   Psychiatric:         Mood and Affect: Mood normal.         Behavior: Behavior normal.             /82 (Site: Left Upper Arm)   Pulse 61   Ht 1.829 m (6')   Wt 91.6 kg (202 lb)   SpO2 99%   BMI 27.40 kg/m²     On this date 2/29/2024 I have spent more than 25 minutes reviewing previous notes, test results and face to face with the patient discussing the diagnosis and importance of compliance with the treatment plan as well as documenting on the day of the visit.  An electronic signature was used to authenticate this note.  -- EUSEBIO Davila - CNP   
[Follow-Up: _____] : a [unfilled] follow-up visit

## 2024-03-04 ENCOUNTER — TELEPHONE (OUTPATIENT)
Dept: INTERNAL MEDICINE CLINIC | Age: 34
End: 2024-03-04

## 2024-03-04 NOTE — TELEPHONE ENCOUNTER
Attempted to contact patient to see if he was able to  his amlodipine as it says the prior auth could not go through because they could not find the patient in the system.    Patients phone rang busy will attempt to call back.

## 2024-04-03 DIAGNOSIS — F90.0 ATTENTION DEFICIT HYPERACTIVITY DISORDER (ADHD), PREDOMINANTLY INATTENTIVE TYPE: ICD-10-CM

## 2024-04-03 DIAGNOSIS — F33.9 MAJOR DEPRESSIVE DISORDER, RECURRENT EPISODE WITH ANXIOUS DISTRESS (HCC): ICD-10-CM

## 2024-04-03 RX ORDER — PAROXETINE HYDROCHLORIDE 20 MG/1
20 TABLET, FILM COATED ORAL EVERY MORNING
Qty: 90 TABLET | Refills: 1 | OUTPATIENT
Start: 2024-04-03

## 2024-04-04 RX ORDER — DEXTROAMPHETAMINE SACCHARATE, AMPHETAMINE ASPARTATE MONOHYDRATE, DEXTROAMPHETAMINE SULFATE AND AMPHETAMINE SULFATE 5; 5; 5; 5 MG/1; MG/1; MG/1; MG/1
20 CAPSULE, EXTENDED RELEASE ORAL EVERY MORNING
Qty: 30 CAPSULE | Refills: 0 | Status: SHIPPED | OUTPATIENT
Start: 2024-04-04 | End: 2024-05-04

## 2024-04-16 NOTE — TELEPHONE ENCOUNTER
Medication Requested: adderall    Last visit: 11/16/21    Next visit: Visit date not found    Last refill: 11/08/21    Med contract on file:  [x] yes   [] no    Last urine drug screen: 05/18/21    Consistent with medication(s):    [x] yes   [] no    Last OARRS ran: unk    Quantity of medication remaining: NONE    Who will be picking rx up: self or [address] [place] [address] if escribed: charlette I will SWITCH the dose or number of times a day I take the medications listed below when I get home from the hospital:  None

## 2024-05-06 DIAGNOSIS — F90.0 ATTENTION DEFICIT HYPERACTIVITY DISORDER (ADHD), PREDOMINANTLY INATTENTIVE TYPE: ICD-10-CM

## 2024-05-06 RX ORDER — DEXTROAMPHETAMINE SACCHARATE, AMPHETAMINE ASPARTATE MONOHYDRATE, DEXTROAMPHETAMINE SULFATE AND AMPHETAMINE SULFATE 5; 5; 5; 5 MG/1; MG/1; MG/1; MG/1
20 CAPSULE, EXTENDED RELEASE ORAL EVERY MORNING
Qty: 30 CAPSULE | Refills: 0 | Status: SHIPPED | OUTPATIENT
Start: 2024-05-06 | End: 2024-06-05

## 2024-05-06 NOTE — TELEPHONE ENCOUNTER
Please check with patient-if he is not going to have psych take this over (as we discussed) he will need seen prior to next refill request

## 2024-05-12 NOTE — TELEPHONE ENCOUNTER
Problem: Discharge Planning  Goal: Discharge to home or other facility with appropriate resources  5/12/2024 1418 by Meghan Harris RN  Outcome: Progressing  Flowsheets (Taken 5/12/2024 1418)  Discharge to home or other facility with appropriate resources:   Identify barriers to discharge with patient and caregiver   Arrange for needed discharge resources and transportation as appropriate   Identify discharge learning needs (meds, wound care, etc)     Problem: Safety - Adult  Goal: Free from fall injury  5/12/2024 1418 by Meghan Harris RN  Outcome: Progressing  Flowsheets (Taken 5/11/2024 0103 by Lakisha Del Real, RN)  Free From Fall Injury: Instruct family/caregiver on patient safety     Problem: Pain  Goal: Verbalizes/displays adequate comfort level or baseline comfort level  5/12/2024 1418 by Meghan Harris RN  Outcome: Progressing  Flowsheets (Taken 5/12/2024 1418)  Verbalizes/displays adequate comfort level or baseline comfort level:   Encourage patient to monitor pain and request assistance   Assess pain using appropriate pain scale   Administer analgesics based on type and severity of pain and evaluate response   Implement non-pharmacological measures as appropriate and evaluate response     Problem: Genitourinary - Adult  Goal: Absence of urinary retention  5/12/2024 1418 by Meghan Harris RN  Outcome: Progressing  Flowsheets (Taken 5/12/2024 1418)  Absence of urinary retention:   Monitor intake/output and perform bladder scan as needed   Discuss catheterization for long term situations as appropriate     Problem: Skin/Tissue Integrity  Goal: Absence of new skin breakdown  Description: 1.  Monitor for areas of redness and/or skin breakdown  2.  Assess vascular access sites hourly  3.  Every 4-6 hours minimum:  Change oxygen saturation probe site  4.  Every 4-6 hours:  If on nasal continuous positive airway pressure, respiratory therapy assess nares and determine need for appliance  Please advise he will need to make appointment new to provider visit for ongoing refills as I am leaving practice

## 2024-07-15 DIAGNOSIS — F90.0 ATTENTION DEFICIT HYPERACTIVITY DISORDER (ADHD), PREDOMINANTLY INATTENTIVE TYPE: ICD-10-CM

## 2024-07-15 DIAGNOSIS — I10 HYPERTENSION GOAL BP (BLOOD PRESSURE) < 130/80: ICD-10-CM

## 2024-07-15 DIAGNOSIS — F33.9 MAJOR DEPRESSIVE DISORDER, RECURRENT EPISODE WITH ANXIOUS DISTRESS (HCC): ICD-10-CM

## 2024-07-15 RX ORDER — PAROXETINE 20 MG/1
20 TABLET, FILM COATED ORAL EVERY MORNING
Qty: 90 TABLET | Refills: 1 | Status: SHIPPED | OUTPATIENT
Start: 2024-07-15

## 2024-07-15 RX ORDER — DEXTROAMPHETAMINE SACCHARATE, AMPHETAMINE ASPARTATE MONOHYDRATE, DEXTROAMPHETAMINE SULFATE AND AMPHETAMINE SULFATE 5; 5; 5; 5 MG/1; MG/1; MG/1; MG/1
20 CAPSULE, EXTENDED RELEASE ORAL EVERY MORNING
Qty: 30 CAPSULE | Refills: 0 | OUTPATIENT
Start: 2024-07-15 | End: 2024-08-14

## 2024-07-15 RX ORDER — AMLODIPINE BESYLATE 5 MG/1
5 TABLET ORAL DAILY
Qty: 90 TABLET | Refills: 1 | Status: SHIPPED | OUTPATIENT
Start: 2024-07-15

## 2024-08-07 ENCOUNTER — OFFICE VISIT (OUTPATIENT)
Dept: INTERNAL MEDICINE CLINIC | Age: 34
End: 2024-08-07
Payer: COMMERCIAL

## 2024-08-07 VITALS
DIASTOLIC BLOOD PRESSURE: 86 MMHG | SYSTOLIC BLOOD PRESSURE: 128 MMHG | BODY MASS INDEX: 28.17 KG/M2 | HEART RATE: 59 BPM | WEIGHT: 208 LBS | OXYGEN SATURATION: 98 % | HEIGHT: 72 IN

## 2024-08-07 DIAGNOSIS — F90.0 ATTENTION DEFICIT HYPERACTIVITY DISORDER (ADHD), PREDOMINANTLY INATTENTIVE TYPE: Primary | ICD-10-CM

## 2024-08-07 DIAGNOSIS — I10 HYPERTENSION GOAL BP (BLOOD PRESSURE) < 130/80: ICD-10-CM

## 2024-08-07 DIAGNOSIS — Z13.1 DIABETES MELLITUS SCREENING: ICD-10-CM

## 2024-08-07 DIAGNOSIS — R71.8 LOW MEAN CORPUSCULAR VOLUME (MCV): ICD-10-CM

## 2024-08-07 DIAGNOSIS — Z13.220 LIPID SCREENING: ICD-10-CM

## 2024-08-07 PROCEDURE — 3079F DIAST BP 80-89 MM HG: CPT | Performed by: NURSE PRACTITIONER

## 2024-08-07 PROCEDURE — 99213 OFFICE O/P EST LOW 20 MIN: CPT | Performed by: NURSE PRACTITIONER

## 2024-08-07 PROCEDURE — 3074F SYST BP LT 130 MM HG: CPT | Performed by: NURSE PRACTITIONER

## 2024-08-07 RX ORDER — DEXTROAMPHETAMINE SACCHARATE, AMPHETAMINE ASPARTATE MONOHYDRATE, DEXTROAMPHETAMINE SULFATE AND AMPHETAMINE SULFATE 5; 5; 5; 5 MG/1; MG/1; MG/1; MG/1
20 CAPSULE, EXTENDED RELEASE ORAL EVERY MORNING
Qty: 30 CAPSULE | Refills: 0 | Status: SHIPPED | OUTPATIENT
Start: 2024-08-07 | End: 2024-09-06

## 2024-08-07 SDOH — ECONOMIC STABILITY: FOOD INSECURITY: WITHIN THE PAST 12 MONTHS, THE FOOD YOU BOUGHT JUST DIDN'T LAST AND YOU DIDN'T HAVE MONEY TO GET MORE.: NEVER TRUE

## 2024-08-07 SDOH — ECONOMIC STABILITY: FOOD INSECURITY: WITHIN THE PAST 12 MONTHS, YOU WORRIED THAT YOUR FOOD WOULD RUN OUT BEFORE YOU GOT MONEY TO BUY MORE.: NEVER TRUE

## 2024-08-07 SDOH — ECONOMIC STABILITY: INCOME INSECURITY: HOW HARD IS IT FOR YOU TO PAY FOR THE VERY BASICS LIKE FOOD, HOUSING, MEDICAL CARE, AND HEATING?: NOT HARD AT ALL

## 2024-08-07 NOTE — PROGRESS NOTES
Visit Information    Have you changed or started any medications since your last visit including any over-the-counter medicines, vitamins, or herbal medicines? no   Are you having any side effects from any of your medications? -  no  Have you stopped taking any of your medications? Is so, why? -  no    Have you seen any other physician or provider since your last visit? No  Have you had any other diagnostic tests since your last visit? No  Have you been seen in the emergency room and/or had an admission to a hospital since we last saw you? No  Have you had your routine dental cleaning in the past 6 months? no    Have you activated your [employer] account? If not, what are your barriers? Yes     Patient Care Team:  Saige Kelly APRN - CNP as PCP - General (Internal Medicine)  Saige Kelly APRN - CNP as PCP - Empaneled Provider  Behnfeldt, Philip, APRN - NP (Nurse Practitioner)    Medical History Review  Past Medical, Family, and Social History reviewed and does contribute to the patient presenting condition    Health Maintenance   Topic Date Due    Hepatitis C screen  Never done    DTaP/Tdap/Td vaccine (7 - Td or Tdap) 2014    COVID-19 Vaccine (2 2023-24 season) 2023    Flu vaccine (1) Never done    Depression Monitoring  2025    Hepatitis B vaccine  Completed    Hib vaccine  Completed    Varicella vaccine  Completed    HIV screen  Completed    Hepatitis A vaccine  Aged Out    HPV vaccine  Aged Out    Polio vaccine  Aged Out    Meningococcal (ACWY) vaccine  Aged Out    Pneumococcal 0-64 years Vaccine  Aged Out    Taurus Mayen (: 1990) is a 33 y.o. male is here for evaluation of the following chief complaint(s): ADHD    Assessment/Plan:   1. Attention deficit hyperactivity disorder (ADHD), predominantly inattentive type  Comments:  Controlled on Adderall, continue current dose. Patient will check with insurance to see if any psych providers in network  Orders:  -

## 2024-09-23 DIAGNOSIS — F33.9 MAJOR DEPRESSIVE DISORDER, RECURRENT EPISODE WITH ANXIOUS DISTRESS (HCC): ICD-10-CM

## 2024-09-23 DIAGNOSIS — F90.0 ATTENTION DEFICIT HYPERACTIVITY DISORDER (ADHD), PREDOMINANTLY INATTENTIVE TYPE: ICD-10-CM

## 2024-09-23 DIAGNOSIS — I10 HYPERTENSION GOAL BP (BLOOD PRESSURE) < 130/80: ICD-10-CM

## 2024-09-23 RX ORDER — PAROXETINE 20 MG/1
20 TABLET, FILM COATED ORAL EVERY MORNING
Qty: 90 TABLET | Refills: 1 | Status: SHIPPED | OUTPATIENT
Start: 2024-09-23

## 2024-09-23 RX ORDER — AMLODIPINE BESYLATE 5 MG/1
5 TABLET ORAL DAILY
Qty: 90 TABLET | Refills: 1 | Status: SHIPPED | OUTPATIENT
Start: 2024-09-23

## 2024-09-23 RX ORDER — DEXTROAMPHETAMINE SACCHARATE, AMPHETAMINE ASPARTATE MONOHYDRATE, DEXTROAMPHETAMINE SULFATE AND AMPHETAMINE SULFATE 5; 5; 5; 5 MG/1; MG/1; MG/1; MG/1
20 CAPSULE, EXTENDED RELEASE ORAL EVERY MORNING
Qty: 30 CAPSULE | Refills: 0 | Status: SHIPPED | OUTPATIENT
Start: 2024-09-23 | End: 2024-10-23

## 2024-10-08 DIAGNOSIS — F33.9 MAJOR DEPRESSIVE DISORDER, RECURRENT EPISODE WITH ANXIOUS DISTRESS (HCC): ICD-10-CM

## 2024-10-08 DIAGNOSIS — F90.0 ATTENTION DEFICIT HYPERACTIVITY DISORDER (ADHD), PREDOMINANTLY INATTENTIVE TYPE: ICD-10-CM

## 2024-10-08 DIAGNOSIS — I10 HYPERTENSION GOAL BP (BLOOD PRESSURE) < 130/80: ICD-10-CM

## 2024-10-11 RX ORDER — PAROXETINE 20 MG/1
20 TABLET, FILM COATED ORAL EVERY MORNING
Qty: 90 TABLET | Refills: 1 | Status: SHIPPED | OUTPATIENT
Start: 2024-10-11

## 2024-10-11 RX ORDER — AMLODIPINE BESYLATE 5 MG/1
5 TABLET ORAL DAILY
Qty: 90 TABLET | Refills: 1 | Status: SHIPPED | OUTPATIENT
Start: 2024-10-11

## 2024-10-11 RX ORDER — DEXTROAMPHETAMINE SACCHARATE, AMPHETAMINE ASPARTATE MONOHYDRATE, DEXTROAMPHETAMINE SULFATE AND AMPHETAMINE SULFATE 5; 5; 5; 5 MG/1; MG/1; MG/1; MG/1
20 CAPSULE, EXTENDED RELEASE ORAL EVERY MORNING
Qty: 30 CAPSULE | Refills: 0 | Status: SHIPPED | OUTPATIENT
Start: 2024-10-11 | End: 2024-11-10

## 2024-12-05 DIAGNOSIS — F90.0 ATTENTION DEFICIT HYPERACTIVITY DISORDER (ADHD), PREDOMINANTLY INATTENTIVE TYPE: ICD-10-CM

## 2024-12-06 RX ORDER — DEXTROAMPHETAMINE SACCHARATE, AMPHETAMINE ASPARTATE MONOHYDRATE, DEXTROAMPHETAMINE SULFATE AND AMPHETAMINE SULFATE 5; 5; 5; 5 MG/1; MG/1; MG/1; MG/1
20 CAPSULE, EXTENDED RELEASE ORAL EVERY MORNING
Qty: 30 CAPSULE | Refills: 0 | Status: SHIPPED | OUTPATIENT
Start: 2024-12-06 | End: 2025-01-05

## 2024-12-19 ENCOUNTER — OFFICE VISIT (OUTPATIENT)
Dept: INTERNAL MEDICINE CLINIC | Age: 34
End: 2024-12-19
Payer: COMMERCIAL

## 2024-12-19 VITALS
SYSTOLIC BLOOD PRESSURE: 132 MMHG | BODY MASS INDEX: 30.07 KG/M2 | WEIGHT: 222 LBS | HEART RATE: 64 BPM | HEIGHT: 72 IN | DIASTOLIC BLOOD PRESSURE: 86 MMHG | OXYGEN SATURATION: 98 %

## 2024-12-19 DIAGNOSIS — Z28.21 FLU VACCINE REFUSED: ICD-10-CM

## 2024-12-19 DIAGNOSIS — R07.9 INTERMITTENT CHEST PAIN: ICD-10-CM

## 2024-12-19 DIAGNOSIS — F98.8 ATTENTION DEFICIT DISORDER, UNSPECIFIED TYPE: Primary | ICD-10-CM

## 2024-12-19 DIAGNOSIS — F41.9 ANXIETY AND DEPRESSION: ICD-10-CM

## 2024-12-19 DIAGNOSIS — F32.A ANXIETY AND DEPRESSION: ICD-10-CM

## 2024-12-19 PROCEDURE — 99213 OFFICE O/P EST LOW 20 MIN: CPT | Performed by: NURSE PRACTITIONER

## 2024-12-19 ASSESSMENT — ENCOUNTER SYMPTOMS
TROUBLE SWALLOWING: 0
NAUSEA: 0
CONSTIPATION: 0
EYE DISCHARGE: 0
WHEEZING: 0
DIARRHEA: 0
SORE THROAT: 0
COUGH: 0
ABDOMINAL PAIN: 0
SHORTNESS OF BREATH: 0

## 2024-12-19 NOTE — PROGRESS NOTES
\"Have you been to the ER, urgent care clinic since your last visit?  Hospitalized since your last visit?\"    NO    “Have you seen or consulted any other health care providers outside our system since your last visit?”    NO              Taurus Mayen (: 1990) is a 34 y.o. male is here for evaluation of the following chief complaint(s): ADHD (Follow up)    Assessment/Plan:     Visit Diagnoses and Associated Orders       Attention deficit disorder, unspecified type    -  Primary    Will continue current dose, patient to establish with psych for further E/M         Anxiety and depression        Stable on Paxil, continue current dose and patient to establish with psych         Flu vaccine refused             Intermittent chest pain        Patient to stop energy drinks, monitor sx, advised on red flag sx requiring urgent eval                 Return in 3 months (on 3/19/2025) for routine follow up, or sooner if needed.  Subjective/Objective:   Since last visit: no change.  Medication compliance: sometimes, has had insurance change, and supply issues-[facility] is out of this dose apparently.  Side effects from medication include: none.   has been reviewed.   Thinks he just got over the flu- had chills, body aches, feeling better now.  Did do intake at [facility] but they didn't take old insurance, he will schedule appt with them to take over ADD management.   Info for Dr Waqar Garcia given as back up plan.    Review of Systems   Constitutional:  Negative for chills, fatigue and fever.   HENT:  Negative for congestion, ear pain, sore throat and trouble swallowing.    Eyes:  Negative for discharge and visual disturbance.   Respiratory:  Negative for cough, shortness of breath and wheezing.    Cardiovascular:  Negative for chest pain, palpitations and leg swelling.        States he has had some twinges of chest pain but thinks its from drinking energy drinks    Gastrointestinal:  Negative for abdominal pain,

## 2025-01-13 DIAGNOSIS — F90.0 ATTENTION DEFICIT HYPERACTIVITY DISORDER (ADHD), PREDOMINANTLY INATTENTIVE TYPE: ICD-10-CM

## 2025-01-13 DIAGNOSIS — I10 HYPERTENSION GOAL BP (BLOOD PRESSURE) < 130/80: ICD-10-CM

## 2025-01-14 RX ORDER — DEXTROAMPHETAMINE SACCHARATE, AMPHETAMINE ASPARTATE MONOHYDRATE, DEXTROAMPHETAMINE SULFATE AND AMPHETAMINE SULFATE 5; 5; 5; 5 MG/1; MG/1; MG/1; MG/1
20 CAPSULE, EXTENDED RELEASE ORAL EVERY MORNING
Qty: 30 CAPSULE | Refills: 0 | Status: SHIPPED | OUTPATIENT
Start: 2025-01-14 | End: 2025-02-13

## 2025-01-14 RX ORDER — AMLODIPINE BESYLATE 5 MG/1
5 TABLET ORAL DAILY
Qty: 90 TABLET | Refills: 1 | Status: SHIPPED | OUTPATIENT
Start: 2025-01-14

## 2025-02-17 DIAGNOSIS — F90.0 ATTENTION DEFICIT HYPERACTIVITY DISORDER (ADHD), PREDOMINANTLY INATTENTIVE TYPE: ICD-10-CM

## 2025-02-17 DIAGNOSIS — I10 HYPERTENSION GOAL BP (BLOOD PRESSURE) < 130/80: ICD-10-CM

## 2025-02-18 RX ORDER — AMLODIPINE BESYLATE 5 MG/1
5 TABLET ORAL DAILY
Qty: 90 TABLET | Refills: 1 | Status: SHIPPED | OUTPATIENT
Start: 2025-02-18

## 2025-02-18 RX ORDER — DEXTROAMPHETAMINE SACCHARATE, AMPHETAMINE ASPARTATE MONOHYDRATE, DEXTROAMPHETAMINE SULFATE AND AMPHETAMINE SULFATE 5; 5; 5; 5 MG/1; MG/1; MG/1; MG/1
20 CAPSULE, EXTENDED RELEASE ORAL EVERY MORNING
Qty: 30 CAPSULE | Refills: 0 | Status: SHIPPED | OUTPATIENT
Start: 2025-02-18 | End: 2025-03-20

## 2025-04-11 DIAGNOSIS — I10 HYPERTENSION GOAL BP (BLOOD PRESSURE) < 130/80: ICD-10-CM

## 2025-04-11 DIAGNOSIS — F90.0 ATTENTION DEFICIT HYPERACTIVITY DISORDER (ADHD), PREDOMINANTLY INATTENTIVE TYPE: ICD-10-CM

## 2025-04-15 RX ORDER — AMLODIPINE BESYLATE 5 MG/1
5 TABLET ORAL DAILY
Qty: 90 TABLET | Refills: 1 | Status: SHIPPED | OUTPATIENT
Start: 2025-04-15

## 2025-04-15 RX ORDER — DEXTROAMPHETAMINE SACCHARATE, AMPHETAMINE ASPARTATE MONOHYDRATE, DEXTROAMPHETAMINE SULFATE AND AMPHETAMINE SULFATE 5; 5; 5; 5 MG/1; MG/1; MG/1; MG/1
20 CAPSULE, EXTENDED RELEASE ORAL EVERY MORNING
Qty: 30 CAPSULE | Refills: 0 | Status: SHIPPED | OUTPATIENT
Start: 2025-04-15 | End: 2025-05-15

## 2025-04-29 DIAGNOSIS — F90.0 ATTENTION DEFICIT HYPERACTIVITY DISORDER (ADHD), PREDOMINANTLY INATTENTIVE TYPE: ICD-10-CM

## 2025-04-29 DIAGNOSIS — I10 HYPERTENSION GOAL BP (BLOOD PRESSURE) < 130/80: ICD-10-CM

## 2025-04-29 RX ORDER — AMLODIPINE BESYLATE 5 MG/1
5 TABLET ORAL DAILY
Qty: 90 TABLET | Refills: 1 | Status: SHIPPED | OUTPATIENT
Start: 2025-04-29

## 2025-04-29 RX ORDER — DEXTROAMPHETAMINE SACCHARATE, AMPHETAMINE ASPARTATE MONOHYDRATE, DEXTROAMPHETAMINE SULFATE AND AMPHETAMINE SULFATE 5; 5; 5; 5 MG/1; MG/1; MG/1; MG/1
20 CAPSULE, EXTENDED RELEASE ORAL EVERY MORNING
Qty: 30 CAPSULE | Refills: 0 | OUTPATIENT
Start: 2025-04-29 | End: 2025-05-29

## 2025-04-29 NOTE — TELEPHONE ENCOUNTER
Can you check with him regarding the Adderall please? He was supposed to get in with [facility] (or with psych) for further management of ADHD.

## 2025-04-29 NOTE — TELEPHONE ENCOUNTER
Pt states he has not gotten in with [facility] yet because he has been in the process of moving

## 2025-05-07 ENCOUNTER — TELEPHONE (OUTPATIENT)
Dept: INTERNAL MEDICINE CLINIC | Age: 35
End: 2025-05-07

## (undated) DEVICE — CANNULA ARTHSCP L7CM ID8.25MM TRNSLUC THRD FLX W/ NO SQUIRT

## (undated) DEVICE — BLANKET WRM W40.2XL55.9IN IORT LO BODY + MISTRAL AIR

## (undated) DEVICE — SHOULDER CANNULA SET WITHOUT FENESTRATIONS, 5.5 MM (I.D.) X 70 MM: Brand: CONMED

## (undated) DEVICE — GOWN SIRUS NONREIN LG W/TWL: Brand: MEDLINE INDUSTRIES, INC.

## (undated) DEVICE — SUTURE SUTTAPE FIBERLINK 1.3MM WHT BLU CLS LOOP AR7535

## (undated) DEVICE — SOLUTION IRRIG 3000ML 0.9% SOD CHL USP UROMATIC PLAS CONT

## (undated) DEVICE — PAD,ABDOMINAL,8"X10",ST,LF: Brand: MEDLINE

## (undated) DEVICE — DRAPE,U/ SHT,SPLIT,PLAS,STERIL: Brand: MEDLINE

## (undated) DEVICE — SLING TRAC LAT DISP FOR DECUB SHLDR SUSP SYS

## (undated) DEVICE — GAMMEX® NON-LATEX SIZE 7, STERILE NEOPRENE POWDER-FREE SURGICAL GLOVE: Brand: GAMMEX

## (undated) DEVICE — TUBING, SUCTION, 1/4" X 10', STRAIGHT: Brand: MEDLINE

## (undated) DEVICE — PASSER SUT 90DEG STR NIT WIRE LOOP 2 FIBERSTICK MFIL SUT

## (undated) DEVICE — 3M™ STERI-STRIP™ REINFORCED ADHESIVE SKIN CLOSURES, R1547, 1/2 IN X 4 IN (12 MM X 100 MM), 6 STRIPS/ENVELOPE: Brand: 3M™ STERI-STRIP™

## (undated) DEVICE — INCISOR PLUS PLATINUM 4.5 MM BLADE: Brand: DYONICS INCISOR

## (undated) DEVICE — COTTON UNDERCAST PADDING,CRIMPED FINISH: Brand: WEBRIL

## (undated) DEVICE — DYONICS 25 PATIENT TUBE SET MUST                                    BE USED WITH 7211007, 12 PER BOX

## (undated) DEVICE — 3M™ STERI-DRAPE™ U-DRAPE 1015: Brand: STERI-DRAPE™

## (undated) DEVICE — GAUZE,SPONGE,4"X4",8PLY,STRL,LF,10/TRAY: Brand: MEDLINE

## (undated) DEVICE — PASSER SUT 45DEG R CRV NIT WIRE LOOP 2 FIBERSTICK MFIL SUT

## (undated) DEVICE — 3M™ STERI-DRAPE™ U-DRAPE 1067 1067 5/BX 4BX/CS/CTN&#X20;: Brand: STERI-DRAPE™

## (undated) DEVICE — MAT FLR W28XL40IN STD GRN 60% RECYCL MAT LO ABSRB SGL LAYR

## (undated) DEVICE — AMBIENT SUPER TURBOVAC 90: Brand: COBLATION

## (undated) DEVICE — KIT PERC INSRT 17GA SPNL NDL 1.1MM NIT GWIRE PORTAL DIL

## (undated) DEVICE — 3M™ TEGADERM™ TRANSPARENT FILM DRESSING FRAME STYLE, 1624W, 2-3/8 IN X 2-3/4 IN (6 CM X 7 CM), 100/CT 4CT/CASE: Brand: 3M™ TEGADERM™

## (undated) DEVICE — SUTURE PROL SZ 0 L30IN NONABSORBABLE BLU L36MM CT-1 1/2 CIR 8424H

## (undated) DEVICE — STRIP,CLOSURE,WOUND,MEDI-STRIP,1/2X4: Brand: MEDLINE

## (undated) DEVICE — 3M™ COBAN™ NL STERILE NON-LATEX SELF-ADHERENT WRAP, 2086S, 6 IN X 5 YD (15 CM X 4,5 M), 12 ROLLS/CASE: Brand: 3M™ COBAN™

## (undated) DEVICE — MEDICINE CUP, GRADUATED, STER: Brand: MEDLINE

## (undated) DEVICE — GLOVE ORTHO 7 1/2   MSG9475

## (undated) DEVICE — APPLICATOR MEDICATED 26 CC SOLUTION HI LT ORNG CHLORAPREP

## (undated) DEVICE — SHEET,DRAPE,53X77,STERILE: Brand: MEDLINE

## (undated) DEVICE — 3M™ STERI-DRAPE™ INCISE DRAPE 1050 (60CM X 45CM): Brand: STERI-DRAPE™

## (undated) DEVICE — SLEEVE ARM DISPOSABLE FOR LAT DECUB SHLDR SUSP SYS

## (undated) DEVICE — SLING ORTH COMFORTABLE LG 13-15 IN HND STRP HK ULTRASLING II

## (undated) DEVICE — PACK PROCEDURE SURG SHLDR MFFOP CUST

## (undated) DEVICE — BOWL MED L 32OZ PLAS W/ MOLD GRAD EZ OPN PEEL PCH

## (undated) DEVICE — GLOVE ORANGE PI 7 1/2   MSG9075

## (undated) DEVICE — 1010 S-DRAPE TOWEL DRAPE 10/BX: Brand: STERI-DRAPE™